# Patient Record
Sex: MALE | Race: WHITE | Employment: UNEMPLOYED | URBAN - METROPOLITAN AREA
[De-identification: names, ages, dates, MRNs, and addresses within clinical notes are randomized per-mention and may not be internally consistent; named-entity substitution may affect disease eponyms.]

---

## 2018-02-21 ENCOUNTER — HOSPITAL ENCOUNTER (INPATIENT)
Dept: ONCOLOGY | Age: 68
LOS: 23 days | Discharge: HOME | DRG: 425 | End: 2018-03-16
Attending: EMERGENCY MEDICINE | Admitting: INTERNAL MEDICINE
Payer: MEDICAID

## 2018-02-21 DIAGNOSIS — N18.6 ESRD ON DIALYSIS (HCC): Primary | ICD-10-CM

## 2018-02-21 DIAGNOSIS — Z99.2 ESRD ON DIALYSIS (HCC): Primary | ICD-10-CM

## 2018-02-21 PROBLEM — E87.70 FLUID OVERLOAD: Status: ACTIVE | Noted: 2018-02-21

## 2018-02-21 PROBLEM — D64.9 ANEMIA: Status: ACTIVE | Noted: 2018-02-21

## 2018-02-21 LAB
ALBUMIN SERPL-MCNC: 2.5 G/DL (ref 3.5–5.2)
ALP BLD-CCNC: 70 U/L (ref 40–129)
ALT SERPL-CCNC: 47 U/L (ref 0–40)
AMYLASE: 185 U/L (ref 20–100)
ANION GAP SERPL CALCULATED.3IONS-SCNC: 16 MMOL/L (ref 7–16)
APTT: 27.3 SEC (ref 24.5–35.1)
AST SERPL-CCNC: 22 U/L (ref 0–39)
BACTERIA: ABNORMAL /HPF
BASOPHILS ABSOLUTE: 0 E9/L (ref 0–0.2)
BASOPHILS RELATIVE PERCENT: 0 % (ref 0–2)
BILIRUB SERPL-MCNC: 0.7 MG/DL (ref 0–1.2)
BILIRUBIN URINE: NEGATIVE
BLOOD, URINE: ABNORMAL
BUN BLDV-MCNC: 78 MG/DL (ref 8–23)
C-REACTIVE PROTEIN: 2.6 MG/DL (ref 0–0.4)
CALCIUM SERPL-MCNC: 7.6 MG/DL (ref 8.6–10.2)
CASTS: ABNORMAL /LPF
CHLORIDE BLD-SCNC: 99 MMOL/L (ref 98–107)
CLARITY: CLEAR
CO2: 20 MMOL/L (ref 22–29)
COLOR: YELLOW
CREAT SERPL-MCNC: 5 MG/DL (ref 0.7–1.2)
EKG ATRIAL RATE: 0 BPM
EKG Q-T INTERVAL: 0 MS
EKG QRS DURATION: 0 MS
EKG QTC CALCULATION (BAZETT): 0 MS
EKG R AXIS: 0 DEGREES
EKG T AXIS: 0 DEGREES
EKG VENTRICULAR RATE: 0 BPM
EOSINOPHILS ABSOLUTE: 0.16 E9/L (ref 0.05–0.5)
EOSINOPHILS RELATIVE PERCENT: 1.7 % (ref 0–6)
GFR AFRICAN AMERICAN: 14
GFR NON-AFRICAN AMERICAN: 12 ML/MIN/1.73
GLUCOSE BLD-MCNC: 327 MG/DL (ref 74–109)
GLUCOSE URINE: 500 MG/DL
HCT VFR BLD CALC: 24.4 % (ref 37–54)
HEMOGLOBIN: 8.5 G/DL (ref 12.5–16.5)
IMMATURE GRANULOCYTES #: 0.11 E9/L
IMMATURE GRANULOCYTES %: 1.2 % (ref 0–5)
INR BLD: 1.1
KETONES, URINE: NEGATIVE MG/DL
LACTIC ACID: 2.2 MMOL/L (ref 0.5–2.2)
LEUKOCYTE ESTERASE, URINE: NEGATIVE
LIPASE: 44 U/L (ref 13–60)
LYMPHOCYTES ABSOLUTE: 0.89 E9/L (ref 1.5–4)
LYMPHOCYTES RELATIVE PERCENT: 9.5 % (ref 20–42)
MCH RBC QN AUTO: 32.6 PG (ref 26–35)
MCHC RBC AUTO-ENTMCNC: 34.8 % (ref 32–34.5)
MCV RBC AUTO: 93.5 FL (ref 80–99.9)
METER GLUCOSE: 350 MG/DL (ref 70–110)
MONOCYTES ABSOLUTE: 0.73 E9/L (ref 0.1–0.95)
MONOCYTES RELATIVE PERCENT: 7.8 % (ref 2–12)
NEUTROPHILS ABSOLUTE: 7.43 E9/L (ref 1.8–7.3)
NEUTROPHILS RELATIVE PERCENT: 79.8 % (ref 43–80)
NITRITE, URINE: NEGATIVE
PDW BLD-RTO: 14.4 FL (ref 11.5–15)
PH UA: 6 (ref 5–9)
PLATELET # BLD: 139 E9/L (ref 130–450)
PMV BLD AUTO: 13.1 FL (ref 7–12)
POTASSIUM SERPL-SCNC: 4.3 MMOL/L (ref 3.5–5)
PRO-BNP: ABNORMAL PG/ML (ref 0–125)
PROTEIN UA: >=300 MG/DL
PROTHROMBIN TIME: 12.3 SEC (ref 9.3–12.4)
RBC # BLD: 2.61 E12/L (ref 3.8–5.8)
RBC UA: ABNORMAL /HPF (ref 0–2)
SEDIMENTATION RATE, ERYTHROCYTE: 40 MM/HR (ref 0–15)
SODIUM BLD-SCNC: 135 MMOL/L (ref 132–146)
SPECIFIC GRAVITY UA: 1.02 (ref 1–1.03)
TOTAL PROTEIN: 5 G/DL (ref 6.4–8.3)
UROBILINOGEN, URINE: 0.2 E.U./DL
WBC # BLD: 9.3 E9/L (ref 4.5–11.5)
WBC UA: ABNORMAL /HPF (ref 0–5)

## 2018-02-21 PROCEDURE — 82150 ASSAY OF AMYLASE: CPT

## 2018-02-21 PROCEDURE — 99285 EMERGENCY DEPT VISIT HI MDM: CPT

## 2018-02-21 PROCEDURE — 80053 COMPREHEN METABOLIC PANEL: CPT

## 2018-02-21 PROCEDURE — 83690 ASSAY OF LIPASE: CPT

## 2018-02-21 PROCEDURE — 87340 HEPATITIS B SURFACE AG IA: CPT

## 2018-02-21 PROCEDURE — 87107 FUNGI IDENTIFICATION MOLD: CPT

## 2018-02-21 PROCEDURE — 85651 RBC SED RATE NONAUTOMATED: CPT

## 2018-02-21 PROCEDURE — 85610 PROTHROMBIN TIME: CPT

## 2018-02-21 PROCEDURE — A9540 TC99M MAA: HCPCS

## 2018-02-21 PROCEDURE — 96365 THER/PROPH/DIAG IV INF INIT: CPT

## 2018-02-21 PROCEDURE — 86706 HEP B SURFACE ANTIBODY: CPT

## 2018-02-21 PROCEDURE — 87070 CULTURE OTHR SPECIMN AEROBIC: CPT

## 2018-02-21 PROCEDURE — 36415 COLL VENOUS BLD VENIPUNCTURE: CPT

## 2018-02-21 PROCEDURE — 82962 GLUCOSE BLOOD TEST: CPT

## 2018-02-21 PROCEDURE — 81015 MICROSCOPIC EXAM OF URINE: CPT

## 2018-02-21 PROCEDURE — 81003 URINALYSIS AUTO W/O SCOPE: CPT

## 2018-02-21 PROCEDURE — 85025 COMPLETE CBC W/AUTO DIFF WBC: CPT

## 2018-02-21 PROCEDURE — 9990 CHARGE CONVERSION

## 2018-02-21 PROCEDURE — 93970 EXTREMITY STUDY: CPT

## 2018-02-21 PROCEDURE — 85730 THROMBOPLASTIN TIME PARTIAL: CPT

## 2018-02-21 PROCEDURE — 86140 C-REACTIVE PROTEIN: CPT

## 2018-02-21 PROCEDURE — 86803 HEPATITIS C AB TEST: CPT

## 2018-02-21 PROCEDURE — 94760 N-INVAS EAR/PLS OXIMETRY 1: CPT

## 2018-02-21 PROCEDURE — 87205 SMEAR GRAM STAIN: CPT

## 2018-02-21 PROCEDURE — 71045 X-RAY EXAM CHEST 1 VIEW: CPT

## 2018-02-21 PROCEDURE — 83880 ASSAY OF NATRIURETIC PEPTIDE: CPT

## 2018-02-21 PROCEDURE — 78582 LUNG VENTILAT&PERFUS IMAGING: CPT

## 2018-02-21 PROCEDURE — 96375 TX/PRO/DX INJ NEW DRUG ADDON: CPT

## 2018-02-21 PROCEDURE — A9558 XE133 XENON 10MCI: HCPCS

## 2018-02-21 PROCEDURE — 86255 FLUORESCENT ANTIBODY SCREEN: CPT

## 2018-02-21 PROCEDURE — 87040 BLOOD CULTURE FOR BACTERIA: CPT

## 2018-02-21 PROCEDURE — 86038 ANTINUCLEAR ANTIBODIES: CPT

## 2018-02-21 PROCEDURE — 71250 CT THORAX DX C-: CPT

## 2018-02-21 PROCEDURE — 83605 ASSAY OF LACTIC ACID: CPT

## 2018-02-21 PROCEDURE — 93005 ELECTROCARDIOGRAM TRACING: CPT

## 2018-02-21 RX ORDER — SODIUM CHLORIDE 0.9 % (FLUSH) 0.9 %
10 SYRINGE (ML) INJECTION PRN
Status: DISCONTINUED | OUTPATIENT
Start: 2018-02-21 | End: 2018-02-21 | Stop reason: SDUPTHER

## 2018-02-21 RX ORDER — AMLODIPINE BESYLATE 5 MG/1
5 TABLET ORAL EVERY MORNING
Status: DISCONTINUED | OUTPATIENT
Start: 2018-02-22 | End: 2018-03-09

## 2018-02-21 RX ORDER — SODIUM CHLORIDE 0.9 % (FLUSH) 0.9 %
10 SYRINGE (ML) INJECTION EVERY 12 HOURS SCHEDULED
Status: DISCONTINUED | OUTPATIENT
Start: 2018-02-21 | End: 2018-03-16 | Stop reason: HOSPADM

## 2018-02-21 RX ORDER — OYSTER SHELL CALCIUM WITH VITAMIN D 500; 200 MG/1; [IU]/1
1 TABLET, FILM COATED ORAL EVERY MORNING
Status: DISCONTINUED | OUTPATIENT
Start: 2018-02-22 | End: 2018-02-24

## 2018-02-21 RX ORDER — SODIUM CHLORIDE 0.9 % (FLUSH) 0.9 %
10 SYRINGE (ML) INJECTION PRN
Status: DISCONTINUED | OUTPATIENT
Start: 2018-02-21 | End: 2018-03-16 | Stop reason: HOSPADM

## 2018-02-21 RX ORDER — ONDANSETRON 2 MG/ML
4 INJECTION INTRAMUSCULAR; INTRAVENOUS EVERY 6 HOURS PRN
Status: DISCONTINUED | OUTPATIENT
Start: 2018-02-21 | End: 2018-03-16 | Stop reason: HOSPADM

## 2018-02-21 RX ORDER — DEXTROSE MONOHYDRATE 25 G/50ML
12.5 INJECTION, SOLUTION INTRAVENOUS PRN
Status: DISCONTINUED | OUTPATIENT
Start: 2018-02-21 | End: 2018-03-16 | Stop reason: HOSPADM

## 2018-02-21 RX ORDER — NICOTINE POLACRILEX 4 MG
15 LOZENGE BUCCAL PRN
Status: DISCONTINUED | OUTPATIENT
Start: 2018-02-21 | End: 2018-03-16 | Stop reason: HOSPADM

## 2018-02-21 RX ORDER — AMLODIPINE BESYLATE 5 MG/1
5 TABLET ORAL EVERY MORNING
Status: ON HOLD | COMMUNITY
End: 2018-05-09

## 2018-02-21 RX ORDER — SODIUM CHLORIDE 0.9 % (FLUSH) 0.9 %
10 SYRINGE (ML) INJECTION EVERY 12 HOURS SCHEDULED
Status: DISCONTINUED | OUTPATIENT
Start: 2018-02-21 | End: 2018-02-21 | Stop reason: SDUPTHER

## 2018-02-21 RX ORDER — ACETAMINOPHEN 325 MG/1
650 TABLET ORAL EVERY 4 HOURS PRN
Status: DISCONTINUED | OUTPATIENT
Start: 2018-02-21 | End: 2018-02-21 | Stop reason: SDUPTHER

## 2018-02-21 RX ORDER — DEXTROSE MONOHYDRATE 50 MG/ML
100 INJECTION, SOLUTION INTRAVENOUS PRN
Status: DISCONTINUED | OUTPATIENT
Start: 2018-02-21 | End: 2018-03-16 | Stop reason: HOSPADM

## 2018-02-21 RX ORDER — HEPARIN SODIUM 10000 [USP'U]/ML
5000 INJECTION, SOLUTION INTRAVENOUS; SUBCUTANEOUS EVERY 8 HOURS SCHEDULED
Status: DISCONTINUED | OUTPATIENT
Start: 2018-02-21 | End: 2018-03-16 | Stop reason: HOSPADM

## 2018-02-21 RX ORDER — LEVOFLOXACIN 500 MG/1
250 TABLET, FILM COATED ORAL DAILY
Status: ON HOLD | COMMUNITY
End: 2018-03-16 | Stop reason: HOSPADM

## 2018-02-21 RX ORDER — ACETAMINOPHEN 325 MG/1
650 TABLET ORAL EVERY 4 HOURS PRN
Status: DISCONTINUED | OUTPATIENT
Start: 2018-02-21 | End: 2018-03-16 | Stop reason: HOSPADM

## 2018-02-21 RX ORDER — BUMETANIDE 1 MG/1
1 TABLET ORAL
Status: DISCONTINUED | OUTPATIENT
Start: 2018-02-21 | End: 2018-02-24

## 2018-02-21 RX ORDER — BUMETANIDE 1 MG/1
1 TABLET ORAL
Status: ON HOLD | COMMUNITY
End: 2018-03-16 | Stop reason: HOSPADM

## 2018-02-21 RX ADMIN — Medication 6 MILLICURIE: at 15:48

## 2018-02-21 RX ADMIN — HEPARIN SODIUM 5000 UNITS: 10000 INJECTION, SOLUTION INTRAVENOUS; SUBCUTANEOUS at 21:30

## 2018-02-21 RX ADMIN — Medication 10 ML: at 21:40

## 2018-02-21 RX ADMIN — INSULIN LISPRO 3 UNITS: 100 INJECTION, SOLUTION INTRAVENOUS; SUBCUTANEOUS at 21:49

## 2018-02-21 RX ADMIN — BUMETANIDE 1 MG: 1 TABLET ORAL at 21:39

## 2018-02-21 RX ADMIN — Medication 10 MILLICURIE: at 15:48

## 2018-02-21 ASSESSMENT — ENCOUNTER SYMPTOMS
EYE PAIN: 0
WHEEZING: 0
SORE THROAT: 0
DIARRHEA: 0
NAUSEA: 0
SHORTNESS OF BREATH: 1
BACK PAIN: 0
HEMOPTYSIS: 1
VOMITING: 0
EYE DISCHARGE: 0
SPUTUM PRODUCTION: 1
COUGH: 1
EYE REDNESS: 0
ABDOMINAL PAIN: 0
SINUS PRESSURE: 0

## 2018-02-21 ASSESSMENT — PAIN SCALES - WONG BAKER: WONGBAKER_NUMERICALRESPONSE: 0

## 2018-02-21 NOTE — ED PROVIDER NOTES
-------------------------------------------------    LABS:  Results for orders placed or performed during the hospital encounter of 02/21/18   CBC Auto Differential   Result Value Ref Range    WBC 9.3 4.5 - 11.5 E9/L    RBC 2.61 (L) 3.80 - 5.80 E12/L    Hemoglobin 8.5 (L) 12.5 - 16.5 g/dL    Hematocrit 24.4 (L) 37.0 - 54.0 %    MCV 93.5 80.0 - 99.9 fL    MCH 32.6 26.0 - 35.0 pg    MCHC 34.8 (H) 32.0 - 34.5 %    RDW 14.4 11.5 - 15.0 fL    Platelets 287 085 - 150 E9/L    MPV 13.1 (H) 7.0 - 12.0 fL    Neutrophils % 79.8 43.0 - 80.0 %    Immature Granulocytes % 1.2 0.0 - 5.0 %    Lymphocytes % 9.5 (L) 20.0 - 42.0 %    Monocytes % 7.8 2.0 - 12.0 %    Eosinophils % 1.7 0.0 - 6.0 %    Basophils % 0.0 0.0 - 2.0 %    Neutrophils # 7.43 (H) 1.80 - 7.30 E9/L    Immature Granulocytes # 0.11 E9/L    Lymphocytes # 0.89 (L) 1.50 - 4.00 E9/L    Monocytes # 0.73 0.10 - 0.95 E9/L    Eosinophils # 0.16 0.05 - 0.50 E9/L    Basophils # 0.00 0.00 - 0.20 E9/L   Comprehensive Metabolic Panel   Result Value Ref Range    Sodium 135 132 - 146 mmol/L    Potassium 4.3 3.5 - 5.0 mmol/L    Chloride 99 98 - 107 mmol/L    CO2 20 (L) 22 - 29 mmol/L    Anion Gap 16 7 - 16 mmol/L    Glucose 327 (H) 74 - 109 mg/dL    BUN 78 (H) 8 - 23 mg/dL    CREATININE 5.0 (H) 0.7 - 1.2 mg/dL    GFR Non-African American 12 >=60 mL/min/1.73    GFR African American 14     Calcium 7.6 (L) 8.6 - 10.2 mg/dL    Total Protein 5.0 (L) 6.4 - 8.3 g/dL    Alb 2.5 (L) 3.5 - 5.2 g/dL    Total Bilirubin 0.7 0.0 - 1.2 mg/dL    Alkaline Phosphatase 70 40 - 129 U/L    ALT 47 (H) 0 - 40 U/L    AST 22 0 - 39 U/L   Lipase   Result Value Ref Range    Lipase 44 13 - 60 U/L   Lactic Acid, Plasma   Result Value Ref Range    Lactic Acid 2.2 0.5 - 2.2 mmol/L   Amylase   Result Value Ref Range    Amylase 185 (H) 20 - 100 U/L   Protime-INR   Result Value Ref Range    Protime 12.3 9.3 - 12.4 sec    INR 1.1    APTT   Result Value Ref Range    aPTT 27.3 24.5 - 35.1 sec   Urinalysis   Result Value Ref Range    Color, UA Yellow Straw/Yellow    Clarity, UA Clear Clear    Glucose, Ur 500 (A) Negative mg/dL    Bilirubin Urine Negative Negative    Ketones, Urine Negative Negative mg/dL    Specific Gravity, UA 1.020 1.005 - 1.030    Blood, Urine MODERATE (A) Negative    pH, UA 6.0 5.0 - 9.0    Protein, UA >=300 (A) Negative mg/dL    Urobilinogen, Urine 0.2 <2.0 E.U./dL    Nitrite, Urine Negative Negative    Leukocyte Esterase, Urine Negative Negative   Brain Natriuretic Peptide   Result Value Ref Range    Pro-BNP 20,873 (H) 0 - 125 pg/mL   Microscopic Urinalysis   Result Value Ref Range    Casts FEW /LPF    WBC, UA 2-5 0 - 5 /HPF    RBC, UA 0-1 0 - 2 /HPF    Bacteria, UA FEW (A) /HPF   Sedimentation Rate   Result Value Ref Range    Sed Rate 40 (H) 0 - 15 mm/Hr   C-Reactive Protein   Result Value Ref Range    CRP 2.6 (H) 0.0 - 0.4 mg/dL   POCT Glucose   Result Value Ref Range    Meter Glucose 350 (H) 70 - 110 mg/dL   EKG 12 Lead   Result Value Ref Range    Ventricular Rate 0 BPM    Atrial Rate 0 BPM    QRS Duration 0 ms    Q-T Interval 0 ms    QTc Calculation (Bazett) 0 ms    R Axis 0 degrees    T Axis 0 degrees       RADIOLOGY:  US DVT LOWER BILATERAL COMPLETE   Final Result   Patent deep venous system of the Bilateral lower   extremity. No evidence for DVT. CT Chest WO Contrast   Final Result   1. Bilateral pleural effusions, left greater than right. 2. Small pericardial effusion. NM LUNG VENT/PERFUSION (VQ)   Final Result   Findings are consistent with a low probability of   pulmonary embolism. This result should be interpreted in conjunction with the clinical   pretest probability for pulmonary embolism. XR CHEST PORTABLE   Final Result   1. Stable, enlarged cardiomediastinal silhouette. .   2. Bibasilar infiltrate/pneumonia versus atelectasis versus a   combination of both with left pleural effusion.    3. Suspected underlying mild central pulmonary vascular congestion. 4. Right-sided central line as above. EKG:    EKG not found. Cardiac monitor shows NSR      ------------------------- NURSING NOTES AND VITALS REVIEWED ---------------------------  Date / Time Roomed:  2/21/2018  6:19 PM  ED Bed Assignment:  [id number]    The nursing notes within the ED encounter and vital signs as below have been reviewed. Patient Vitals for the past 24 hrs:   BP Temp Temp src Pulse Resp SpO2 Height Weight   02/21/18 1935 - - - 88 - 99 % - -   02/21/18 1928 (!) 154/88 98 °F (36.7 °C) Oral 85 18 - 5' 3\" (1.6 m) 206 lb (93.4 kg)   02/21/18 1851 (!) 160/95 98.3 °F (36.8 °C) Oral 89 18 93 % - -   02/21/18 1642 (!) 152/86 - - 74 18 100 % - -   02/21/18 1543 (!) 153/90 - - 76 20 99 % - -   02/21/18 1323 (!) 148/72 97.8 °F (36.6 °C) Oral 82 20 100 % 5' 3\" (1.6 m) 202 lb 13.2 oz (92 kg)       Oxygen Saturation Interpretation: Normal    ------------------------------------------ PROGRESS NOTES ------------------------------------------  Re-evaluation(s):  Time: 1700  Patients symptoms show no change  Repeat physical examination is not changed  Spoke with Dr. Steven Schneider and discussed case. She will arrange dialysis for the patient today. Time: 18    Spoke with Dr. Mathew Geller. Discussed case. He will admit the patient. Counseling:  I have spoken with the patient and family and discussed todays results, in addition to providing specific details for the plan of care and counseling regarding the diagnosis and prognosis. Their questions are answered at this time and they are agreeable with the plan of admission.    --------------------------------- ADDITIONAL PROVIDER NOTES ---------------------------------  Consultations:  Time: 8245. Spoke with Dr. Mathew Geller. Discussed case. They will admit the patient.   This patient's ED course included: a personal history and physicial examination, re-evaluation prior to disposition, multiple bedside re-evaluations, cardiac monitoring, continuous

## 2018-02-21 NOTE — PROGRESS NOTES
27.3 2018   [APTT}  Troponin:    Lab Results   Component Value Date    TROPONINI 0.09 2016     U/A:    Lab Results   Component Value Date    COLORU Yellow 2018    PHUR 6.0 2018    LABCAST FEW 2018    WBCUA 2-5 2018    RBCUA 0-1 2018    BACTERIA FEW 2018    CLARITYU Clear 2018    SPECGRAV 1.020 2018    LEUKOCYTESUR Negative 2018    UROBILINOGEN 0.2 2018    BILIRUBINUR Negative 2018    BLOODU MODERATE 2018    GLUCOSEU 500 2018    AMORPHOUS MANY 2016     ABG:  No results found for: PHART, BFZ1JQM, PO2ART, YPB7RKG, BEART, THGBART, BVD1TOM, U3AAHUJZ  HgBA1c:  No components found for: HGBA1C  Microalbumen/Creatinine ratio:  No components found for: RUCREAT  FLP:    Lab Results   Component Value Date    TRIG 65 2016    HDL 53 2016    LDLCALC 86 2016    LABVLDL 13 2016     TSH:    Lab Results   Component Value Date    TSH 6.250 11/15/2016     VITAMIN B12: No components found for: B12  FOLATE:    Lab Results   Component Value Date    FOLATE 19.9 2016     Iron Saturation:  No components found for: PERCENTFE  FERRITIN:    Lab Results   Component Value Date    FERRITIN 250 2016     RPR:  No results found for: RPR  ABRAM:    Lab Results   Component Value Date    ABRAM NEGATIVE 2016     AMYLASE:    Lab Results   Component Value Date    AMYLASE 185 2018     LIPASE:    Lab Results   Component Value Date    LIPASE 44 2018     24 Hour Urine for Protein:  No components found for: RAWUPRO, UHRS3, DEQH94LH, UTV3  24 Hour Urine for Creatinine Clearance:  No components found for: CREAT4, UHRS10, UTV10     Imagin2018       Exam: XR CHEST PORTABLE       Number of Views: 1       Indication:   Shortness of breath       Comparison: Chest radiographs 3/22/2016. CT chest 2016.       Findings:  There is a stable, enlarged cardiomediastinal silhouette   with bibasilar airspace disease, left greater than right, with left   pleural effusion and interval placement of a large-bore right-sided   central line with its distal tip overlying the region of the distal   superior vena cava/right atrium. Suspected underlying mild central   pulmonary vascular congestion. [place] No pneumothorax.           Impression   1. Stable, enlarged cardiomediastinal silhouette. .   2. Bibasilar infiltrate/pneumonia versus atelectasis versus a   combination of both with left pleural effusion. 3. Suspected underlying mild central pulmonary vascular congestion. 4. Right-sided central line as above.                                                                                                                                         Assessment  1-CKD 5/ESRD in the setting of the DM2  And HTN-now requiring RRT with IHD-will plan for IHD this PM and in the AM for clearance and vol removal-check Hep status- and set up outpt IHD    2-Hypervolemia with pl eff and peripheral edema-will  Check US for DVT-V/Q low probability-recent hx of pne-on ceftriaxone and doxycycline    3-Anion Gap met Acidosis in the setting of the ESRD and missed IHD-follow HCO3 with resumption of reg IHD treatments    4- Anemia in CKD-11/13/16 ferritin 250 , % Fe++ Sat 23 , F96=242 and folate 13-(-)  SPEP and UPEP-start ARNOLDO as the HgB <10-will check Fe++, B12, folate    5-HTN with CKD V/ESRD-suboptimally controlled above the goal ,140/90-exacerbated by the vol overload-will follow with the ultrafiltration     6-Secondary HPTH with hypocalcemia in the setting of the hypoalbuminemia-check PO4, PTH, Vit D, ionized Calcium 10/23/16 PTH 72  low Vit D 8         Thank you Dr. Zac Freitas for allowing us to participate in care of Mikie Anderson  6:47 PM  2/21/2018

## 2018-02-21 NOTE — ED NOTES
Called lab and spoke with Andrew Membreno regarding peripheral blood smear with path review. Per Bill, test will be done off of cbc with diff that was already sent.       Glenn Magallon RN  02/21/18 2986

## 2018-02-21 NOTE — ED NOTES
Patient to ED with complaint of SOB, fatigue, and swelling to arms and legs. Patient recently to [address] from [facility], had dialysis port placed to right chest in [facility] within the last few weeks, last treatment 4 days ago. Per family patient had been in hospital for approx. 2 weeks in [facility] due to pneumonia diagnosis and was started on dialysis at that time. Patient resting in bed at this time, respirations non-labored, cardiac monitor and pulse ox in place, call light in reach.        Jaxon Martins RN  02/21/18 6596

## 2018-02-22 LAB
ALBUMIN SERPL-MCNC: 2.3 G/DL (ref 3.5–5.2)
ALP BLD-CCNC: 67 U/L (ref 40–129)
ALT SERPL-CCNC: 38 U/L (ref 0–40)
ANION GAP SERPL CALCULATED.3IONS-SCNC: 9 MMOL/L (ref 7–16)
AST SERPL-CCNC: 21 U/L (ref 0–39)
BASOPHILS ABSOLUTE: 0 E9/L (ref 0–0.2)
BASOPHILS RELATIVE PERCENT: 0 % (ref 0–2)
BILIRUB SERPL-MCNC: 0.6 MG/DL (ref 0–1.2)
BUN BLDV-MCNC: 36 MG/DL (ref 8–23)
CALCIUM IONIZED: 1.1 MMOL/L (ref 1.15–1.33)
CALCIUM SERPL-MCNC: 7.5 MG/DL (ref 8.6–10.2)
CHLORIDE BLD-SCNC: 102 MMOL/L (ref 98–107)
CO2: 27 MMOL/L (ref 22–29)
CREAT SERPL-MCNC: 2.9 MG/DL (ref 0.7–1.2)
EOSINOPHILS ABSOLUTE: 0.09 E9/L (ref 0.05–0.5)
EOSINOPHILS RELATIVE PERCENT: 1.4 % (ref 0–6)
FERRITIN: 485 NG/ML
FOLATE: 5.3 NG/ML (ref 4.8–24.2)
GFR AFRICAN AMERICAN: 26
GFR NON-AFRICAN AMERICAN: 22 ML/MIN/1.73
GLUCOSE BLD-MCNC: 178 MG/DL (ref 74–109)
HBA1C MFR BLD: 7.3 % (ref 4.8–5.9)
HBV SURFACE AB TITR SER: NORMAL {TITER}
HCT VFR BLD CALC: 21.6 % (ref 37–54)
HCT VFR BLD CALC: 23.6 % (ref 37–54)
HEMOGLOBIN: 7.4 G/DL (ref 12.5–16.5)
HEMOGLOBIN: 8.2 G/DL (ref 12.5–16.5)
HEPATITIS B SURFACE ANTIGEN INTERPRETATION: NORMAL
HEPATITIS C ANTIBODY INTERPRETATION: NORMAL
IMMATURE GRANULOCYTES #: 0.04 E9/L
IMMATURE GRANULOCYTES %: 0.6 % (ref 0–5)
IRON SATURATION: 20 % (ref 20–55)
IRON: 28 MCG/DL (ref 59–158)
LYMPHOCYTES ABSOLUTE: 0.72 E9/L (ref 1.5–4)
LYMPHOCYTES RELATIVE PERCENT: 11.1 % (ref 20–42)
MAGNESIUM: 1.7 MG/DL (ref 1.6–2.6)
MCH RBC QN AUTO: 32.2 PG (ref 26–35)
MCH RBC QN AUTO: 32.2 PG (ref 26–35)
MCHC RBC AUTO-ENTMCNC: 34.3 % (ref 32–34.5)
MCHC RBC AUTO-ENTMCNC: 34.7 % (ref 32–34.5)
MCV RBC AUTO: 92.5 FL (ref 80–99.9)
MCV RBC AUTO: 93.9 FL (ref 80–99.9)
METER GLUCOSE: 188 MG/DL (ref 70–110)
METER GLUCOSE: 220 MG/DL (ref 70–110)
METER GLUCOSE: 222 MG/DL (ref 70–110)
METER GLUCOSE: 257 MG/DL (ref 70–110)
MONOCYTES ABSOLUTE: 0.57 E9/L (ref 0.1–0.95)
MONOCYTES RELATIVE PERCENT: 8.8 % (ref 2–12)
NEUTROPHILS ABSOLUTE: 5.09 E9/L (ref 1.8–7.3)
NEUTROPHILS RELATIVE PERCENT: 78.1 % (ref 43–80)
PARATHYROID HORMONE INTACT: 308 PG/ML (ref 15–65)
PATHOLOGIST REVIEW: NORMAL
PDW BLD-RTO: 14.2 FL (ref 11.5–15)
PDW BLD-RTO: 14.3 FL (ref 11.5–15)
PHOSPHORUS: 2.3 MG/DL (ref 2.5–4.5)
PLATELET # BLD: 117 E9/L (ref 130–450)
PLATELET # BLD: 128 E9/L (ref 130–450)
PMV BLD AUTO: 12.7 FL (ref 7–12)
PMV BLD AUTO: 12.7 FL (ref 7–12)
POTASSIUM SERPL-SCNC: 4 MMOL/L (ref 3.5–5)
RBC # BLD: 2.3 E12/L (ref 3.8–5.8)
RBC # BLD: 2.55 E12/L (ref 3.8–5.8)
SODIUM BLD-SCNC: 138 MMOL/L (ref 132–146)
TOTAL IRON BINDING CAPACITY: 137 MCG/DL (ref 250–450)
TOTAL PROTEIN: 4.5 G/DL (ref 6.4–8.3)
VITAMIN B-12: 599 PG/ML (ref 211–946)
VITAMIN D 25-HYDROXY: 5 NG/ML (ref 30–100)
WBC # BLD: 6.1 E9/L (ref 4.5–11.5)
WBC # BLD: 6.5 E9/L (ref 4.5–11.5)

## 2018-02-22 PROCEDURE — 82746 ASSAY OF FOLIC ACID SERUM: CPT

## 2018-02-22 PROCEDURE — G0257 UNSCHED DIALYSIS ESRD PT HOS: HCPCS

## 2018-02-22 PROCEDURE — 82728 ASSAY OF FERRITIN: CPT

## 2018-02-22 PROCEDURE — 83970 ASSAY OF PARATHORMONE: CPT

## 2018-02-22 PROCEDURE — 36415 COLL VENOUS BLD VENIPUNCTURE: CPT

## 2018-02-22 PROCEDURE — 5A1D70Z PERFORMANCE OF URINARY FILTRATION, INTERMITTENT, LESS THAN 6 HOURS PER DAY: ICD-10-PCS | Performed by: INTERNAL MEDICINE

## 2018-02-22 PROCEDURE — 82607 VITAMIN B-12: CPT

## 2018-02-22 PROCEDURE — 9990 CHARGE CONVERSION

## 2018-02-22 PROCEDURE — 83540 ASSAY OF IRON: CPT

## 2018-02-22 PROCEDURE — 82306 VITAMIN D 25 HYDROXY: CPT

## 2018-02-22 PROCEDURE — 84100 ASSAY OF PHOSPHORUS: CPT

## 2018-02-22 PROCEDURE — 83735 ASSAY OF MAGNESIUM: CPT

## 2018-02-22 PROCEDURE — 85027 COMPLETE CBC AUTOMATED: CPT

## 2018-02-22 PROCEDURE — 86706 HEP B SURFACE ANTIBODY: CPT

## 2018-02-22 PROCEDURE — 85025 COMPLETE CBC W/AUTO DIFF WBC: CPT

## 2018-02-22 PROCEDURE — 82962 GLUCOSE BLOOD TEST: CPT

## 2018-02-22 PROCEDURE — 80053 COMPREHEN METABOLIC PANEL: CPT

## 2018-02-22 PROCEDURE — 83550 IRON BINDING TEST: CPT

## 2018-02-22 PROCEDURE — 80074 ACUTE HEPATITIS PANEL: CPT

## 2018-02-22 PROCEDURE — 83036 HEMOGLOBIN GLYCOSYLATED A1C: CPT

## 2018-02-22 PROCEDURE — 82330 ASSAY OF CALCIUM: CPT

## 2018-02-22 RX ORDER — HEPARIN SODIUM 1000 [USP'U]/ML
INJECTION, SOLUTION INTRAVENOUS; SUBCUTANEOUS
Status: DISPENSED
Start: 2018-02-22 | End: 2018-02-22

## 2018-02-22 RX ADMIN — HEPARIN SODIUM 5000 UNITS: 10000 INJECTION, SOLUTION INTRAVENOUS; SUBCUTANEOUS at 21:23

## 2018-02-22 RX ADMIN — INSULIN LISPRO 1 UNITS: 100 INJECTION, SOLUTION INTRAVENOUS; SUBCUTANEOUS at 21:26

## 2018-02-22 RX ADMIN — Medication 10 ML: at 21:26

## 2018-02-22 RX ADMIN — Medication 10 ML: at 13:41

## 2018-02-22 RX ADMIN — DARBEPOETIN ALFA 60 MCG: 60 INJECTION, SOLUTION INTRAVENOUS; SUBCUTANEOUS at 13:41

## 2018-02-22 RX ADMIN — BUMETANIDE 1 MG: 1 TABLET ORAL at 12:48

## 2018-02-22 RX ADMIN — OYSTER SHELL CALCIUM WITH VITAMIN D 1 TABLET: 500; 200 TABLET, FILM COATED ORAL at 12:48

## 2018-02-22 RX ADMIN — INSULIN LISPRO 2 UNITS: 100 INJECTION, SOLUTION INTRAVENOUS; SUBCUTANEOUS at 12:48

## 2018-02-22 RX ADMIN — HEPARIN SODIUM 5000 UNITS: 10000 INJECTION, SOLUTION INTRAVENOUS; SUBCUTANEOUS at 12:48

## 2018-02-22 RX ADMIN — INSULIN LISPRO 3 UNITS: 100 INJECTION, SOLUTION INTRAVENOUS; SUBCUTANEOUS at 18:08

## 2018-02-22 RX ADMIN — HEPARIN SODIUM 5000 UNITS: 10000 INJECTION, SOLUTION INTRAVENOUS; SUBCUTANEOUS at 06:14

## 2018-02-22 RX ADMIN — AMLODIPINE BESYLATE 5 MG: 5 TABLET ORAL at 12:48

## 2018-02-22 ASSESSMENT — PAIN SCALES - GENERAL
PAINLEVEL_OUTOF10: 0
PAINLEVEL_OUTOF10: 0

## 2018-02-22 ASSESSMENT — PAIN SCALES - WONG BAKER: WONGBAKER_NUMERICALRESPONSE: 0

## 2018-02-22 NOTE — PROGRESS NOTES
Nephrology   Note  Patient's Name: Yaquelin Pearson  10:34 AM  2/22/2018    Nephrologist: Izabel Baires    Reason for Consult:  Proteinuria  Requesting Physician:  No primary care provider on file. Chief Complaint:  Edema    History Obtained From:  patient, spouse/SO and relative(s)    History of Present Ilness:    Yaquelin Pearson is a 78 y.o. male with no known history of CKD but a 12yr hx of DM2 with associated neuropathy and retinopathy who presented to the ER with dyspnea and edema. He was in [facility]  And started on RRT apprx 4 weeks. He is a [facility] resident and returned here to the [facility] over the last 24hrs. He flew from [facility] to [place] and missed his connecting flight and spent the [facility] in the airport  And landed today after a 26hr trip. He has not dialyzed for the last 3 days. He has ongoing edema in the LE. He was also recently treated in [place] ,for pneumonia. He has a HX of PAD  recently hospitalized for gangrene of the L 2nd toe. Cyn mckeon he is resting comfortably in bed and denies dyspnea or CP    2/22/18: Pt seen on IHD this Am tolerating the procedure well, no new coplaints        Past Medical History:   Diagnosis Date    CAP (community acquired pneumonia) 2/22/2016    Diabetes mellitus ([facility]     Dialysis patient ([facility]     Hypertension     Pneumonia        Past Surgical History:   Procedure Laterality Date    OTHER SURGICAL HISTORY Right 10/24/2016    right foot I&D with bone debridement and biopsy partial second digit amputation       Family History   Problem Relation Age of Onset    No Known Problems Mother     No Known Problems Father     No Known Problems Sister     No Known Problems Brother         reports that he has never smoked. He does not have any smokeless tobacco history on file. He reports that he does not drink alcohol or use drugs. Allergies:  Review of patient's allergies indicates no known allergies.     Current Medications:      heparin (porcine) 1000 UNIT/ML injection    darbepoetin jesús-polysorbate (ARANESP) injection 60 mcg Weekly   amLODIPine (NORVASC) tablet 5 mg QAM   bumetanide (BUMEX) tablet 1 mg Lunch   calcium-vitamin D (OSCAL-500) 500-200 MG-UNIT per tablet 1 tablet QAM   albuterol-ipratropium (COMBIVENT RESPIMAT)  MCG/ACT inhaler 1 puff BID   sodium chloride flush 0.9 % injection 10 mL 2 times per day   sodium chloride flush 0.9 % injection 10 mL PRN   acetaminophen (TYLENOL) tablet 650 mg Q4H PRN   magnesium hydroxide (MILK OF MAGNESIA) 400 MG/5ML suspension 30 mL Daily PRN   ondansetron (ZOFRAN) injection 4 mg Q6H PRN   heparin (porcine) injection 5,000 Units 3 times per day   insulin lispro (HUMALOG) injection vial 0-6 Units TID WC   insulin lispro (HUMALOG) injection vial 0-3 Units Nightly   glucose (GLUTOSE) 40 % oral gel 15 g PRN   dextrose 50 % solution 12.5 g PRN   glucagon (rDNA) injection 1 mg PRN   dextrose 5 % solution PRN       Review of Systems:   Pertinent items are noted in HPI.  Remainder of a  Complete review of systems is otherwise (-)    Physical exam:   Constitutional: older hispanaic male   VITALS:    BP (!) 143/81   Pulse 82   Temp 97.7 °F (36.5 °C)   Resp 16   Ht 5' 3\" (1.6 m)   Wt 200 lb 9.9 oz (91 kg)   SpO2 97%   BMI 35.54 kg/m²   CURRENT TEMPERATURE:  Temp: 97.7 °F (36.5 °C)  MAXIMUM TEMPERATURE OVER 24HRS:  Temp (24hrs), Av °F (36.7 °C), Min:97.7 °F (36.5 °C), Max:98.3 °F (36.8 °C)    TEMPERATURE RANGE OVER 24HRS:   Temp  Av °F (36.7 °C)  Min: 97.7 °F (36.5 °C)  Max: 98.3 °F (36.8 °C)  CURRENT RESPIRATORY RATE:  Resp: 16  CURRENT PULSE:  Pulse: 82  24HR PULSE RANGE: Pulse  Av.8  Min: 74  Max: 89  CURRENT BLOOD PRESSURE:  BP: (!) 143/81    24HR BLOOD PRESSURE RANGE:  Systolic (81LKV), ZPH:734 , Min:132 , LKE:739   ; Diastolic (81PEU), YBO:13, Min:69, Max:95    8HR BLOOD PRESSURE RANGE:  BP: (132-165)/(69-94)   CURRENT PULSE OXIMETRY:  SpO2: 97 %  24HR PULSE OXIMETRY RANGE:  SpO2  Av %  Min: 93 %  Max: Nikole Fagan is continued HD 2/23 for vol removal    2-Hypervolemia with pl eff and peripheral edema-(-) US for DVT-V/Q low probability-recent hx of pne-on ceftriaxone and doxycycline    3-Anion Gap met Acidosis in the setting of the ESRD and missed IHD-improved HCO3 with resumption of reg IHD treatments    4- Anemia in CKD-11/13/16 ferritin 250 , % Fe++ Sat 23 , N74=141 and folate 13-(-)  SPEP and UPEP-start ARNOLDO as the HgB <10-await Fe++, B12, folate-HgB down from 8.5 to 7.4 no obvious site of bleeding -Thrombocytopenia with the PLT down from 139 and 117-will check CBC with Dif    5-HTN with CKD V/ESRD-suboptimally controlled nearing  the goal <140/90 with  the vol overload-will follow with the ultrafiltration -no new additional meds    6-Secondary HPTH with hypocalcemia in the setting of the hypoalbuminemia-low PO4 and low  ionized Calcium will supplement the PO4 via nutrition and hold binders-will supplement Ca++ via IHD 10/23/16 PTH 72  low Vit D 8 -await PTH, Vit D    Thank you Dr. Mango Barnes for allowing us to participate in care of Candiec Anderson  10:34 AM  2/22/2018

## 2018-02-22 NOTE — H&P
Vent/perfusion (vq)    Result Date: 2018  Clinical history:  hemoptysis; shortness of breath Agents:  10.8 mCi Xe-133 gas inhaled             7.6 mCi technetium-99m MAA I.V. Procedure: Images of lung ventilation were obtained in three phases. Perfusion lung images were then obtained in multiple views. Findings: After rebreathing radioXenon to equilibrium, activity is evenly distributed throughout the lungs. In the washout phase, the gas is cleared from the lungs without regional retention. The perfusion images demonstrate a slightly inhomogeneous distribution of activity throughout both lungs. No segmental or significant subsegmental perfusion defects are identified. Findings are consistent with a low probability of pulmonary embolism. This result should be interpreted in conjunction with the clinical pretest probability for pulmonary embolism. Xr Chest Portable    Result Date: 2018  Patient MRN: [id number] : 1950 Age:  78 years Gender: Male Order Date: 2018 Exam: XR CHEST PORTABLE Number of Views: 1 Indication:   Shortness of breath Comparison: Chest radiographs 3/22/2016. CT chest 2016. Findings: There is a stable, enlarged cardiomediastinal silhouette with bibasilar airspace disease, left greater than right, with left pleural effusion and interval placement of a large-bore right-sided central line with its distal tip overlying the region of the distal superior vena cava/right atrium. Suspected underlying mild central pulmonary vascular congestion. [place] Spr[place] No pneumothorax. 1. Stable, enlarged cardiomediastinal silhouette. . 2. Bibasilar infiltrate/pneumonia versus atelectasis versus a combination of both with left pleural effusion. 3. Suspected underlying mild central pulmonary vascular congestion. 4. Right-sided central line as above.       ASSESSMENT:      Principal Problem:    Fluid overload  Active Problems:    Diabetes mellitus ([address]    Essential hypertension    ESRD on dialysis

## 2018-02-22 NOTE — PROGRESS NOTES
Obtained consent via  tablet for hemodialysis and blood transfusion in [place] [address]. Notified on call dialysis nurse Helena Laurent of new order for dialysis tonight. Td Sheppard stated that there in another case in process and she would try to contact someone so that the patient could receive dialysis tonight. Td Sheppard will contact me once she confirms an on call nurse is available.  Electronically signed by Nish Sifuentes RN on 2/21/2018 at 8:45 PM

## 2018-02-22 NOTE — PROGRESS NOTES
Spoke to [employer], a nurse will be available to do dialysis tonight and will be able to start about 10 pm. Electronically signed by Margarito Leavitt RN on 2/21/2018 at 8:46 PM

## 2018-02-23 LAB
ANCA IFA: NORMAL
ANTI-NUCLEAR ANTIBODY (ANA): NEGATIVE
CALCIUM IONIZED: 1.09 MMOL/L (ref 1.15–1.33)
HAV IGM SER IA-ACNC: NORMAL
HBV SURFACE AB TITR SER: NORMAL {TITER}
HEPATITIS B CORE IGM ANTIBODY: NORMAL
HEPATITIS B SURFACE ANTIGEN INTERPRETATION: NORMAL
HEPATITIS C ANTIBODY INTERPRETATION: NORMAL
MAGNESIUM: 1.8 MG/DL (ref 1.6–2.6)
METER GLUCOSE: 149 MG/DL (ref 70–110)
METER GLUCOSE: 355 MG/DL (ref 70–110)
METER GLUCOSE: 455 MG/DL (ref 70–110)
PHOSPHORUS: 2 MG/DL (ref 2.5–4.5)

## 2018-02-23 PROCEDURE — G0257 UNSCHED DIALYSIS ESRD PT HOS: HCPCS

## 2018-02-23 PROCEDURE — 97161 PT EVAL LOW COMPLEX 20 MIN: CPT

## 2018-02-23 PROCEDURE — 9990 CHARGE CONVERSION

## 2018-02-23 PROCEDURE — 82962 GLUCOSE BLOOD TEST: CPT

## 2018-02-23 PROCEDURE — G8988 SELF CARE GOAL STATUS: HCPCS

## 2018-02-23 PROCEDURE — 94640 AIRWAY INHALATION TREATMENT: CPT

## 2018-02-23 PROCEDURE — 83735 ASSAY OF MAGNESIUM: CPT

## 2018-02-23 PROCEDURE — 84100 ASSAY OF PHOSPHORUS: CPT

## 2018-02-23 PROCEDURE — 97165 OT EVAL LOW COMPLEX 30 MIN: CPT

## 2018-02-23 PROCEDURE — 36415 COLL VENOUS BLD VENIPUNCTURE: CPT

## 2018-02-23 PROCEDURE — 82330 ASSAY OF CALCIUM: CPT

## 2018-02-23 PROCEDURE — G8987 SELF CARE CURRENT STATUS: HCPCS

## 2018-02-23 RX ADMIN — HEPARIN SODIUM 5000 UNITS: 10000 INJECTION, SOLUTION INTRAVENOUS; SUBCUTANEOUS at 12:54

## 2018-02-23 RX ADMIN — INSULIN LISPRO 3 UNITS: 100 INJECTION, SOLUTION INTRAVENOUS; SUBCUTANEOUS at 20:48

## 2018-02-23 RX ADMIN — INSULIN LISPRO 1 UNITS: 100 INJECTION, SOLUTION INTRAVENOUS; SUBCUTANEOUS at 12:53

## 2018-02-23 RX ADMIN — AMLODIPINE BESYLATE 5 MG: 5 TABLET ORAL at 12:49

## 2018-02-23 RX ADMIN — INSULIN LISPRO 6 UNITS: 100 INJECTION, SOLUTION INTRAVENOUS; SUBCUTANEOUS at 18:12

## 2018-02-23 RX ADMIN — HEPARIN SODIUM 5000 UNITS: 10000 INJECTION, SOLUTION INTRAVENOUS; SUBCUTANEOUS at 06:38

## 2018-02-23 RX ADMIN — Medication 10 ML: at 20:52

## 2018-02-23 RX ADMIN — BUMETANIDE 1 MG: 1 TABLET ORAL at 12:49

## 2018-02-23 RX ADMIN — OYSTER SHELL CALCIUM WITH VITAMIN D 1 TABLET: 500; 200 TABLET, FILM COATED ORAL at 12:49

## 2018-02-23 RX ADMIN — HEPARIN SODIUM 5000 UNITS: 10000 INJECTION, SOLUTION INTRAVENOUS; SUBCUTANEOUS at 20:49

## 2018-02-23 ASSESSMENT — PAIN SCALES - GENERAL
PAINLEVEL_OUTOF10: 0
PAINLEVEL_OUTOF10: 0

## 2018-02-23 NOTE — PROGRESS NOTES
Patient 's family member brought in [place] card for this patient. I informed the family member that it had  on 10/01/16. He stated that he would contact [facility] and look into it.  copy is in patients chart.

## 2018-02-23 NOTE — PROGRESS NOTES
[facility] Hospitalist Progress Note    Admitting Date and Time: 2/21/2018  6:19 PM  Admit Dx: ESRD ON DIALYSIS    Subjective: Patient seen sitting up in the chair, no acute distress,  Family at bedside. Speaks very little [place], family assisting with translating. Denies pain, sob, fever, chills, N/V/D  No current complaints. Discussed case with Social work: attempting to set up outpatient HD but cannot due so until he has medicaid. ROS: denies fever, chills, cp, sob, n/v, HA unless stated above.  darbepoetin jesús-polysorbate  60 mcg Subcutaneous Weekly    amLODIPine  5 mg Oral QAM    bumetanide  1 mg Oral Lunch    calcium-vitamin D  1 tablet Oral QAM    albuterol-ipratropium  1 puff Inhalation BID    sodium chloride flush  10 mL Intravenous 2 times per day    heparin (porcine)  5,000 Units Subcutaneous 3 times per day    insulin lispro  0-6 Units Subcutaneous TID WC    insulin lispro  0-3 Units Subcutaneous Nightly       sodium chloride flush 10 mL PRN   acetaminophen 650 mg Q4H PRN   magnesium hydroxide 30 mL Daily PRN   ondansetron 4 mg Q6H PRN   glucose 15 g PRN   dextrose 12.5 g PRN   glucagon (rDNA) 1 mg PRN   dextrose 100 mL/hr PRN        Objective:    /87   Pulse 86   Temp 98.6 °F (37 °C) (Oral)   Resp 20   Ht 5' 3\" (1.6 m)   Wt 204 lb 2.3 oz (92.6 kg)   SpO2 96%   BMI 36.16 kg/m²     General Appearance: alert and oriented to person, place and time and in no acute distress.  Speaks very limitid English  Skin: warm and dry  Head: normocephalic and atraumatic  Neck: neck supple and non tender without mass   Pulmonary/Chest: Diminished bilaterally- no wheezes, rales or rhonchi, normal air movement, no respiratory distress  Cardiovascular: normal rate, normal S1 and S2 and no carotid bruits  Abdomen: soft, non-tender, non-distended, normal bowel sounds, no masses or organomegaly  Extremities: no cyanosis, no clubbing +3-+4 edema to bilateral lower extremities  Neurologic: no cranial nerve deficit and speech normal        Recent Labs      02/21/18   1350  02/22/18   0039   NA  135  138   K  4.3  4.0   CL  99  102   CO2  20*  27   BUN  78*  36*   CREATININE  5.0*  2.9*   GLUCOSE  327*  178*   CALCIUM  7.6*  7.5*       Recent Labs      02/21/18   1350  02/22/18   0039  02/22/18   1058   WBC  9.3  6.1  6.5   RBC  2.61*  2.30*  2.55*   HGB  8.5*  7.4*  8.2*   HCT  24.4*  21.6*  23.6*   MCV  93.5  93.9  92.5   MCH  32.6  32.2  32.2   MCHC  34.8*  34.3  34.7*   RDW  14.4  14.3  14.2   PLT  139  117*  128*   MPV  13.1*  12.7*  12.7*         Assessment:    Principal Problem:    Fluid overload  Active Problems:    Diabetes mellitus (HCC)    Essential hypertension    [facility] ([facility]    Anemia  Resolved Problems:    * No resolved hospital problems. *      Plan:  1. Shortness of breath: due to Fluid overload from  missing dialysis. we will start the patient on dialysis. Nephrology managing. VQ scan was low probability for PE, CT of the chest showed moderate left sided pleural effusion, no signs of pneumonia. 2.  ESRD:  started on dialysis 3 weeks ago when patient was in [facility]. He has never received dialysis in the [facility]. Nephrology following. SW  arranging for outpatient dialysis. 3.  History of hypertension: blood pressure is above goal, will resume dialysis, resume amlodipine. Monitor vitals. 4.  Anemia of chronic disease/end-stage renal disease: hemoglobin is 8.2, continue monitoring. 5.  History of diabetes: was on metformin and sliding scale insulin. currently the patient reports he is not taking any hypoglycemic agents. Continue on low-dose SSI, last A1c we have in our system was more than a year ago at 9.9, I will repeat A1c-7.3. 6.  Deconditioning: the patient declined with his functional status ever since he had his second toe of the right foot amputated back in October 2016. PT/OT to evaluate.         Electronically signed by Carmen Diego CNP on 2/23/2018 at 3:15

## 2018-02-23 NOTE — PROGRESS NOTES
Nephrology   Note  Patient's Name: Madison Mai  9:35 AM  2/23/2018    Nephrologist: Jordan Michelle    Reason for Consult:  Proteinuria  Requesting Physician:  No primary care provider on file. Chief Complaint:  Edema    History Obtained From:  patient, spouse/SO and relative(s)    History of Present Ilness:    Madison Mai is a 78 y.o. male with no known history of CKD but a 12yr hx of DM2 with associated neuropathy and retinopathy who presented to the ER with dyspnea and edema. He was in [facility]  And started on RRT apprx 4 weeks. He is a [facility] resident and returned here to the [facility] over the last 24hrs. He flew from [facility] to [place] and missed his connecting flight and spent the [facility] in the airport  And landed today after a 26hr trip. He has not dialyzed for the last 3 days. He has ongoing edema in the LE. He was also recently treated in [place] ,for pneumonia. He has a HX of PAD  recently hospitalized for gangrene of the L 2nd toe. Mychal mckeon he is resting comfortably in bed and denies dyspnea or CP    2/23/18: Pt seen on IHD this Am tolerating the procedure well, no new coplaints        Past Medical History:   Diagnosis Date    CAP (community acquired pneumonia) 2/22/2016    [facility]     Dialysis patient ([facility]     Hypertension     Pneumonia        Past Surgical History:   Procedure Laterality Date    OTHER SURGICAL HISTORY Right 10/24/2016    right foot I&D with bone debridement and biopsy partial second digit amputation       Family History   Problem Relation Age of Onset    No Known Problems Mother     No Known Problems Father     No Known Problems Sister     No Known Problems Brother         reports that he has never smoked. He does not have any smokeless tobacco history on file. He reports that he does not drink alcohol or use drugs. Allergies:  Review of patient's allergies indicates no known allergies.     Current Medications:      darbepoetin jesús-polysorbate (ARANESP) injection 60 mcg Weekly   amLODIPine (NORVASC) tablet 5 mg QAM   bumetanide (BUMEX) tablet 1 mg Lunch   calcium-vitamin D (OSCAL-500) 500-200 MG-UNIT per tablet 1 tablet QAM   albuterol-ipratropium (COMBIVENT RESPIMAT)  MCG/ACT inhaler 1 puff BID   sodium chloride flush 0.9 % injection 10 mL 2 times per day   sodium chloride flush 0.9 % injection 10 mL PRN   acetaminophen (TYLENOL) tablet 650 mg Q4H PRN   magnesium hydroxide (MILK OF MAGNESIA) 400 MG/5ML suspension 30 mL Daily PRN   ondansetron (ZOFRAN) injection 4 mg Q6H PRN   heparin (porcine) injection 5,000 Units 3 times per day   insulin lispro (HUMALOG) injection vial 0-6 Units TID WC   insulin lispro (HUMALOG) injection vial 0-3 Units Nightly   glucose (GLUTOSE) 40 % oral gel 15 g PRN   dextrose 50 % solution 12.5 g PRN   glucagon (rDNA) injection 1 mg PRN   dextrose 5 % solution PRN       Review of Systems:   Pertinent items are noted in HPI.  Remainder of a  Complete review of systems is otherwise (-)    Physical exam:   Constitutional: older hispanaic male   VITALS:    BP (!) 155/94   Pulse 85   Temp 99.9 °F (37.7 °C)   Resp 16   Ht 5' 3\" (1.6 m)   Wt 204 lb 2.3 oz (92.6 kg)   SpO2 100%   BMI 36.16 kg/m²   CURRENT TEMPERATURE:  Temp: 99.9 °F (37.7 °C)  MAXIMUM TEMPERATURE OVER 24HRS:  Temp (24hrs), Av.1 °F (37.3 °C), Min:98.3 °F (36.8 °C), Max:99.9 °F (37.7 °C)    TEMPERATURE RANGE OVER 24HRS:   Temp  Av.1 °F (37.3 °C)  Min: 98.3 °F (36.8 °C)  Max: 99.9 °F (37.7 °C)  CURRENT RESPIRATORY RATE:  Resp: 16  CURRENT PULSE:  Pulse: 85  24HR PULSE RANGE: Pulse  Av.5  Min: 75  Max: 87  CURRENT BLOOD PRESSURE:  BP: (!) 155/94    24HR BLOOD PRESSURE RANGE:  Systolic (91CAK), IQY:954 , Min:85 , VEK:521   ; Diastolic (30UOP), IMP:96, Min:58, Max:94    8HR BLOOD PRESSURE RANGE:  BP: (138-155)/(81-94)   CURRENT PULSE OXIMETRY:  SpO2: 100 %  24HR PULSE OXIMETRY RANGE:  SpO2  Av.5 %  Min: 99 %  Max: 100 %  Gen: alert, awake, continued HD 2/24 for vol removal-setting up out pt IHD at [facility] as the pt lives in [place]    2-Hypervolemia with pl eff and peripheral edema-(-) US for DVT-V/Q low probability-recent hx of pne-on ceftriaxone and doxycycline    3-Anion Gap met Acidosis in the setting of the ESRD and missed IHD-improved HCO3 with resumption of reg IHD treatments    4- Anemia in CKD-11/13/16 ferritin 250 , % Fe++ Sat 23 , L90=103 and folate 13-(-)  SPEP and UPEP-started ARNOLDO as the HgB <10- Ferritin 485 with iron sat 20%, B12 and  Folate adequate -HgB stable at 8.2 -Thrombocytopenia with the PLT down from 139 --> 117-->128-will follow    5-HTN with CKD V/ESRD-suboptimally controlled nearing  the goal <140/90 with  the vol overload-will follow with the ultrafiltration -no new additional meds    6-Secondary HPTH with hypocalcemia in the setting of the hypoalbuminemia-low PO4 and low  ionized Calcium will supplement the PO4 via nutrition and hold binders-will supplement Ca++ via IHD 10/23/16 PTH 72  low Vit D 8 -currently  in goal range, Vit D 5 will supplement    Thank you Dr. Thurnell Goodpasture for allowing us to participate in care of Mariella Anderson  9:35 AM  2/23/2018

## 2018-02-23 NOTE — PROGRESS NOTES
[facility] Hospitalist Progress Note    Admitting Date and Time: 2/21/2018  6:19 PM  Admit Dx: ESRD ON DIALYSIS    Subjective:    Pt denies fever, chills, cp, sob, n/v, pain or sob.  darbepoetin jesús-polysorbate  60 mcg Subcutaneous Weekly    amLODIPine  5 mg Oral QAM    bumetanide  1 mg Oral Lunch    calcium-vitamin D  1 tablet Oral QAM    albuterol-ipratropium  1 puff Inhalation BID    sodium chloride flush  10 mL Intravenous 2 times per day    heparin (porcine)  5,000 Units Subcutaneous 3 times per day    insulin lispro  0-6 Units Subcutaneous TID WC    insulin lispro  0-3 Units Subcutaneous Nightly       sodium chloride flush 10 mL PRN   acetaminophen 650 mg Q4H PRN   magnesium hydroxide 30 mL Daily PRN   ondansetron 4 mg Q6H PRN   glucose 15 g PRN   dextrose 12.5 g PRN   glucagon (rDNA) 1 mg PRN   dextrose 100 mL/hr PRN        Objective:        PHYSICAL EXAM:    Vitals:  BP (!) 118/58   Pulse 82   Temp 98.3 °F (36.8 °C) (Oral)   Resp 16   Ht 5' 3\" (1.6 m)   Wt 198 lb 3.1 oz (89.9 kg)   SpO2 99%   BMI 35.11 kg/m²     General:  Appears comfortable. No acute distress. Answers questions appropriately and cooperative with exam  HEENT:  Mucous membranes moist. No erythema, rhinorrhea, or post-nasal drip noted. Neck:  No carotid bruits. Heart:  Rhythm regular at rate of 84  Lungs:  CTA but diminished. No wheeze, rales, or rhonchi  Abdomen:  Positive bowel sounds positive. Soft. Non-tender. No guarding, rebound or rigidity. Breast/Rectal/Genitourinary: not pertinent. Extremities:  Pos for 3-4+ b/l lower extremity edema  Skin:  Warm and dry  Vascular: 2/4 Dorsalis Pedis pulses bilaterally. Neuro:  Cranial nerves 2-12 grossly intact, no focal weakness or change in sensation noted. Extraocular muscles intact. Pupils equal, round, reactive to light.               Recent Labs      02/21/18   1350  02/22/18   0039   NA  135  138   K  4.3  4.0   CL  99  102   CO2  20*  27   BUN  78*  36*

## 2018-02-23 NOTE — PROGRESS NOTES
decreased independence with functional mobility/transfers, decreased independence with bed mobility, decreased standing balance, decreased activity tolerance, and decreased B UE strength. Patient would benefit from continued OT services upon discharge in order to maximize independence/safety with ADLs/IADLs, functional transfers and mobility, along with other functional tasks of choice. Need for consistent assistance with ADLs/IADLs anticipated upon discharge to ensure patient's safety. Comments: Upon this OTR's arrival to patient's room, patient seated in bedside chair. Upon conclusion of this session, patient seated in bedside chair with call light in reach, B LEs elevated, and family members present. During the start and prior to conclusion of this session, environmental modifications (including line management, as appropriate) were completed for patient's safety and efficiency of treatment session. Patient Education: Throughout this session, patient education was provided regarding proper hand placement during functional transfers, techniques to maximize safety, importance of having assistance with functional transfers/mobility during hospitalization, call light use, and OT plan of care; patient demonstrated limited understanding. Occupations Limited By Various Client Factors and Performance Skills:  ADLs - bathing, dressing, toileting, grooming X   IADLs X   Rest and Sleep    Education    Work    Play    Leisure    Social Participation      Patient / Family Goal: Patient did not state a goal.     Treatment Plan: Skilled OT treatment to be provided for ADL re-training, neuromuscular re-education, functional transfer/mobility training, cognitive re-training, equipment needs, energy conservation techniques, patient and/or family education/training, environmental modifications, compensatory techniques for ADL performance, and any splinting/positioning needs, all as needed.  When appropriate, clear delegation orders will be provided for nursing staff. Goals:  1. Patient will perform grooming tasks independently while standing at sink within 1 week. 2. Patient will perform upper body dressing/bathing tasks independently, including item retrieval, within 1 week. 3. Patient will demonstrate 4+/5 BUE strength within 2 weeks in order to maximize independence with ADLs. 4. Patient will perform lower body dressing/bathing with modified independence, with use of AE/DME as needed/appropriate, within 2 weeks. 5. Patient will perform all aspects of toileting independently within 2 weeks. 6. Patient will perform functional transfers independently within 2 weeks in order to maximize independence with ADLs. 7. Patient will perform functional mobility independently within 2 weeks in order to maximize independence with ADLs. 8. Patient will demonstrate Good understanding and consistent implementation of energy conservation techniques and work simplification techniques into ADL routines. Rehab Potential: Good    Patient and/or family indicated understanding of this OT plan of care: Yes    Time Out: 3:03pm    OT Evaluation Complexity Level: Low  This level of OT evaluation was determined based upon the of the following: complexity of occupational profile and review of medical/therapy history completed, number of performance deficits demonstrated, and level of clinical decision making required to develop this OT plan of care. Low Complexity Medium Complexity High Complexity Comments:   Occupational Profile and Medical/Therapy History X   Brief review of patient's medical/therapy history completed. Number of Performance Deficits  X  Patient demonstrates five performance deficits. Clinical Decision Making X   Clinical decision making of low analytic complexity needed for development of this OT plan of care. Patient demonstrates few comorbidities that affect occupational performance.  Minimal

## 2018-02-24 LAB
ALBUMIN SERPL-MCNC: 2.4 G/DL (ref 3.5–5.2)
ALP BLD-CCNC: 77 U/L (ref 40–129)
ALT SERPL-CCNC: 32 U/L (ref 0–40)
ANION GAP SERPL CALCULATED.3IONS-SCNC: 9 MMOL/L (ref 7–16)
AST SERPL-CCNC: 20 U/L (ref 0–39)
BILIRUB SERPL-MCNC: 0.5 MG/DL (ref 0–1.2)
BUN BLDV-MCNC: 22 MG/DL (ref 8–23)
CALCIUM IONIZED: 1.07 MMOL/L (ref 1.15–1.33)
CALCIUM SERPL-MCNC: 7.4 MG/DL (ref 8.6–10.2)
CHLORIDE BLD-SCNC: 98 MMOL/L (ref 98–107)
CO2: 29 MMOL/L (ref 22–29)
CREAT SERPL-MCNC: 2.8 MG/DL (ref 0.7–1.2)
CULTURE, RESPIRATORY: ABNORMAL
CULTURE, RESPIRATORY: ABNORMAL
GFR AFRICAN AMERICAN: 28
GFR NON-AFRICAN AMERICAN: 23 ML/MIN/1.73
GLUCOSE BLD-MCNC: 159 MG/DL (ref 74–109)
HCT VFR BLD CALC: 22.9 % (ref 37–54)
HEMOGLOBIN: 7.6 G/DL (ref 12.5–16.5)
MAGNESIUM: 1.7 MG/DL (ref 1.6–2.6)
MCH RBC QN AUTO: 31.8 PG (ref 26–35)
MCHC RBC AUTO-ENTMCNC: 33.2 % (ref 32–34.5)
MCV RBC AUTO: 95.8 FL (ref 80–99.9)
METER GLUCOSE: 188 MG/DL (ref 70–110)
METER GLUCOSE: 235 MG/DL (ref 70–110)
METER GLUCOSE: 269 MG/DL (ref 70–110)
ORGANISM: ABNORMAL
PDW BLD-RTO: 13.9 FL (ref 11.5–15)
PHOSPHORUS: 1.6 MG/DL (ref 2.5–4.5)
PLATELET # BLD: 134 E9/L (ref 130–450)
PMV BLD AUTO: 12.6 FL (ref 7–12)
POTASSIUM SERPL-SCNC: 4.2 MMOL/L (ref 3.5–5)
RBC # BLD: 2.39 E12/L (ref 3.8–5.8)
SMEAR, RESPIRATORY: ABNORMAL
SODIUM BLD-SCNC: 136 MMOL/L (ref 132–146)
TOTAL PROTEIN: 5 G/DL (ref 6.4–8.3)
WBC # BLD: 7 E9/L (ref 4.5–11.5)

## 2018-02-24 PROCEDURE — 82962 GLUCOSE BLOOD TEST: CPT

## 2018-02-24 PROCEDURE — 94640 AIRWAY INHALATION TREATMENT: CPT

## 2018-02-24 PROCEDURE — 80053 COMPREHEN METABOLIC PANEL: CPT

## 2018-02-24 PROCEDURE — 82330 ASSAY OF CALCIUM: CPT

## 2018-02-24 PROCEDURE — 85027 COMPLETE CBC AUTOMATED: CPT

## 2018-02-24 PROCEDURE — 36415 COLL VENOUS BLD VENIPUNCTURE: CPT

## 2018-02-24 PROCEDURE — 83735 ASSAY OF MAGNESIUM: CPT

## 2018-02-24 PROCEDURE — 9990 CHARGE CONVERSION

## 2018-02-24 PROCEDURE — G0257 UNSCHED DIALYSIS ESRD PT HOS: HCPCS

## 2018-02-24 PROCEDURE — 84100 ASSAY OF PHOSPHORUS: CPT

## 2018-02-24 RX ORDER — BUMETANIDE 1 MG/1
2 TABLET ORAL 2 TIMES DAILY
Status: DISCONTINUED | OUTPATIENT
Start: 2018-02-24 | End: 2018-03-16 | Stop reason: HOSPADM

## 2018-02-24 RX ORDER — OYSTER SHELL CALCIUM WITH VITAMIN D 500; 200 MG/1; [IU]/1
1 TABLET, FILM COATED ORAL 2 TIMES DAILY
Status: DISCONTINUED | OUTPATIENT
Start: 2018-02-24 | End: 2018-03-01

## 2018-02-24 RX ADMIN — BUMETANIDE 1 MG: 1 TABLET ORAL at 12:41

## 2018-02-24 RX ADMIN — INSULIN LISPRO 2 UNITS: 100 INJECTION, SOLUTION INTRAVENOUS; SUBCUTANEOUS at 17:17

## 2018-02-24 RX ADMIN — INSULIN LISPRO 2 UNITS: 100 INJECTION, SOLUTION INTRAVENOUS; SUBCUTANEOUS at 20:47

## 2018-02-24 RX ADMIN — AMLODIPINE BESYLATE 5 MG: 5 TABLET ORAL at 12:40

## 2018-02-24 RX ADMIN — HEPARIN SODIUM 5000 UNITS: 10000 INJECTION, SOLUTION INTRAVENOUS; SUBCUTANEOUS at 06:13

## 2018-02-24 RX ADMIN — OYSTER SHELL CALCIUM WITH VITAMIN D 1 TABLET: 500; 200 TABLET, FILM COATED ORAL at 20:46

## 2018-02-24 RX ADMIN — HEPARIN SODIUM 5000 UNITS: 10000 INJECTION, SOLUTION INTRAVENOUS; SUBCUTANEOUS at 20:45

## 2018-02-24 RX ADMIN — HEPARIN SODIUM 5000 UNITS: 10000 INJECTION, SOLUTION INTRAVENOUS; SUBCUTANEOUS at 12:40

## 2018-02-24 RX ADMIN — BUMETANIDE 2 MG: 1 TABLET ORAL at 20:45

## 2018-02-24 ASSESSMENT — PAIN SCALES - GENERAL
PAINLEVEL_OUTOF10: 0

## 2018-02-24 NOTE — PROGRESS NOTES
Nephrology   Note  Patient's Name: Jeanine Rocha  11:41 AM  2/24/2018        Reason for Consult:  Proteinuria  Requesting Physician:  No primary care provider on file. Chief Complaint:  Edema    History Obtained From:  patient, spouse/SO and relative(s)    History of Present Ilness:    Jeanine Rocha is a 78 y.o. male with no known history of CKD but a 12yr hx of DM2 with associated neuropathy and retinopathy who presented to the ER with dyspnea and edema. He was in [facility]  And started on RRT apprx 4 weeks. He is a [facility] resident and returned here to the [facility] over the last 24hrs. He flew from [facility] to [place] and missed his connecting flight and spent the [facility] in the airport  And landed today after a 26hr trip. He has not dialyzed for the last 3 days. He has ongoing edema in the LE. He was also recently treated in [place] ,for pneumonia. He has a HX of PAD  recently hospitalized for gangrene of the L 2nd toe. Merribe Borders tly he is resting comfortably in bed and denies dyspnea or CP    2/23/18: Pt seen on IHD this Am tolerating the procedure well, no new coplaints    2/24/18- seen on HD, per staff no issues with treatment. He does not speak a great deal of english, language barrier. Allergies:  Review of patient's allergies indicates no known allergies.     Current Medications:      darbepoetin jesús-polysorbate (ARANESP) injection 60 mcg Weekly   amLODIPine (NORVASC) tablet 5 mg QAM   bumetanide (BUMEX) tablet 1 mg Lunch   calcium-vitamin D (OSCAL-500) 500-200 MG-UNIT per tablet 1 tablet QAM   albuterol-ipratropium (COMBIVENT RESPIMAT)  MCG/ACT inhaler 1 puff BID   sodium chloride flush 0.9 % injection 10 mL 2 times per day   sodium chloride flush 0.9 % injection 10 mL PRN   acetaminophen (TYLENOL) tablet 650 mg Q4H PRN   magnesium hydroxide (MILK OF MAGNESIA) 400 MG/5ML suspension 30 mL Daily PRN   ondansetron (ZOFRAN) injection 4 mg Q6H PRN   heparin (porcine) injection 5,000 Units 3 times per 02/24/2018    HCT 22.9 02/24/2018    MCV 95.8 02/24/2018    MCH 31.8 02/24/2018    MCHC 33.2 02/24/2018    RDW 13.9 02/24/2018     02/24/2018    MPV 12.6 02/24/2018     CBC with Differential:    Lab Results   Component Value Date    WBC 7.0 02/24/2018    RBC 2.39 02/24/2018    HGB 7.6 02/24/2018    HCT 22.9 02/24/2018     02/24/2018    MCV 95.8 02/24/2018    MCH 31.8 02/24/2018    MCHC 33.2 02/24/2018    RDW 13.9 02/24/2018    LYMPHOPCT 11.1 02/22/2018    MONOPCT 8.8 02/22/2018    BASOPCT 0.0 02/22/2018    MONOSABS 0.57 02/22/2018    LYMPHSABS 0.72 02/22/2018    EOSABS 0.09 02/22/2018    BASOSABS 0.00 02/22/2018     Hemoglobin/Hematocrit:    Lab Results   Component Value Date    HGB 7.6 02/24/2018    HCT 22.9 02/24/2018     CMP:    Lab Results   Component Value Date     02/24/2018    K 4.2 02/24/2018    CL 98 02/24/2018    CO2 29 02/24/2018    BUN 22 02/24/2018    CREATININE 2.8 02/24/2018    GFRAA 28 02/24/2018    LABGLOM 23 02/24/2018    GLUCOSE 159 02/24/2018    PROT 5.0 02/24/2018    LABALBU 2.4 02/24/2018    CALCIUM 7.4 02/24/2018    BILITOT 0.5 02/24/2018    ALKPHOS 77 02/24/2018    AST 20 02/24/2018    ALT 32 02/24/2018     BMP:    Lab Results   Component Value Date     02/24/2018    K 4.2 02/24/2018    CL 98 02/24/2018    CO2 29 02/24/2018    BUN 22 02/24/2018    LABALBU 2.4 02/24/2018    CREATININE 2.8 02/24/2018    CALCIUM 7.4 02/24/2018    GFRAA 28 02/24/2018    LABGLOM 23 02/24/2018    GLUCOSE 159 02/24/2018     BUN/Creatinine:    Lab Results   Component Value Date    BUN 22 02/24/2018    CREATININE 2.8 02/24/2018     Hepatic Function Panel:    Lab Results   Component Value Date    ALKPHOS 77 02/24/2018    ALT 32 02/24/2018    AST 20 02/24/2018    PROT 5.0 02/24/2018    BILITOT 0.5 02/24/2018    BILIDIR 0.3 02/19/2016    IBILI 0.7 02/19/2016    LABALBU 2.4 02/24/2018     Albumin:    Lab Results   Component Value Date    LABALBU 2.4 02/24/2018     Calcium:    Lab Results patient continues to have significant anemia with chronic kidney disease. He is on at rest. He developed worsening hypophosphatemia with dialysis and he is currently on phosphate supplement and I will repeat phosphorus tomorrow. The patient has mild hypocalcemia and he is on calcium with vitamin D supplementation not with meals to avoid phosphate binding. He continues to hypertension with chronic kidney disease, hoping with improvement with further fluid removal on dialysis and maybe with Bumex as well.     Marlon Carver

## 2018-02-24 NOTE — PROGRESS NOTES
[facility] Hospitalist Progress Note    Admitting Date and Time: 2/21/2018  6:19 PM  Admit Dx: ESRD ON DIALYSIS    Subjective: Patient seen resting in bed while at dialysis. No family present today. Shakes head no to pain or SOB. Appears comfortable. No issues per nursing staff. ROS: denies fever, chills, cp, sob, n/v, HA unless stated above.  calcium-vitamin D  1 tablet Oral BID    potassium & sodium phosphates  1 packet Oral BID    darbepoetin jesús-polysorbate  60 mcg Subcutaneous Weekly    amLODIPine  5 mg Oral QAM    bumetanide  1 mg Oral Lunch    albuterol-ipratropium  1 puff Inhalation BID    sodium chloride flush  10 mL Intravenous 2 times per day    heparin (porcine)  5,000 Units Subcutaneous 3 times per day    insulin lispro  0-6 Units Subcutaneous TID WC    insulin lispro  0-3 Units Subcutaneous Nightly       sodium chloride flush 10 mL PRN   acetaminophen 650 mg Q4H PRN   magnesium hydroxide 30 mL Daily PRN   ondansetron 4 mg Q6H PRN   glucose 15 g PRN   dextrose 12.5 g PRN   glucagon (rDNA) 1 mg PRN   dextrose 100 mL/hr PRN        Objective:    BP (!) 160/89   Pulse 94   Temp 98.4 °F (36.9 °C) (Oral)   Resp 16   Ht 5' 3\" (1.6 m)   Wt 215 lb 13.3 oz (97.9 kg)   SpO2 98%   BMI 38.23 kg/m²       General Appearance: alert and oriented to person, place and time and in no acute distress.  Speaks very limitid English  Skin: warm and dry  Head: normocephalic and atraumatic  Neck: neck supple and non tender without mass   Pulmonary/Chest: Diminished bilaterally- no wheezes, rales or rhonchi, normal air movement, no respiratory distress  Cardiovascular: normal rate, normal S1 and S2 and no carotid bruits  Abdomen: soft, non-tender, non-distended, normal bowel sounds, no masses or organomegaly  Extremities: no cyanosis, no clubbing +3-+4 edema to bilateral lower extremities  Neurologic: no cranial nerve deficit and speech normal      Recent Labs      02/22/18   0039  02/24/18   [id number]

## 2018-02-25 LAB
ANION GAP SERPL CALCULATED.3IONS-SCNC: 8 MMOL/L (ref 7–16)
BUN BLDV-MCNC: 29 MG/DL (ref 8–23)
CALCIUM IONIZED: 1.2 MMOL/L (ref 1.15–1.33)
CALCIUM SERPL-MCNC: 8.2 MG/DL (ref 8.6–10.2)
CHLORIDE BLD-SCNC: 99 MMOL/L (ref 98–107)
CO2: 29 MMOL/L (ref 22–29)
CREAT SERPL-MCNC: 3.3 MG/DL (ref 0.7–1.2)
GFR AFRICAN AMERICAN: 23
GFR NON-AFRICAN AMERICAN: 19 ML/MIN/1.73
GLUCOSE BLD-MCNC: 229 MG/DL (ref 74–109)
HCT VFR BLD CALC: 21.6 % (ref 37–54)
HEMOGLOBIN: 7.1 G/DL (ref 12.5–16.5)
MAGNESIUM: 1.8 MG/DL (ref 1.6–2.6)
MCH RBC QN AUTO: 31.8 PG (ref 26–35)
MCHC RBC AUTO-ENTMCNC: 32.9 % (ref 32–34.5)
MCV RBC AUTO: 96.9 FL (ref 80–99.9)
METER GLUCOSE: 157 MG/DL (ref 70–110)
METER GLUCOSE: 244 MG/DL (ref 70–110)
METER GLUCOSE: 264 MG/DL (ref 70–110)
METER GLUCOSE: 304 MG/DL (ref 70–110)
PDW BLD-RTO: 13.9 FL (ref 11.5–15)
PHOSPHORUS: 2 MG/DL (ref 2.5–4.5)
PLATELET # BLD: 145 E9/L (ref 130–450)
PMV BLD AUTO: 13.2 FL (ref 7–12)
POTASSIUM SERPL-SCNC: 4.8 MMOL/L (ref 3.5–5)
RBC # BLD: 2.23 E12/L (ref 3.8–5.8)
SODIUM BLD-SCNC: 136 MMOL/L (ref 132–146)
WBC # BLD: 7.6 E9/L (ref 4.5–11.5)

## 2018-02-25 PROCEDURE — 85027 COMPLETE CBC AUTOMATED: CPT

## 2018-02-25 PROCEDURE — 84100 ASSAY OF PHOSPHORUS: CPT

## 2018-02-25 PROCEDURE — 80048 BASIC METABOLIC PNL TOTAL CA: CPT

## 2018-02-25 PROCEDURE — 82330 ASSAY OF CALCIUM: CPT

## 2018-02-25 PROCEDURE — 82962 GLUCOSE BLOOD TEST: CPT

## 2018-02-25 PROCEDURE — 9990 CHARGE CONVERSION

## 2018-02-25 PROCEDURE — 94640 AIRWAY INHALATION TREATMENT: CPT

## 2018-02-25 PROCEDURE — 83735 ASSAY OF MAGNESIUM: CPT

## 2018-02-25 PROCEDURE — 36592 COLLECT BLOOD FROM PICC: CPT

## 2018-02-25 PROCEDURE — 36415 COLL VENOUS BLD VENIPUNCTURE: CPT

## 2018-02-25 RX ADMIN — HEPARIN SODIUM 5000 UNITS: 10000 INJECTION, SOLUTION INTRAVENOUS; SUBCUTANEOUS at 06:55

## 2018-02-25 RX ADMIN — HEPARIN SODIUM 5000 UNITS: 10000 INJECTION, SOLUTION INTRAVENOUS; SUBCUTANEOUS at 22:52

## 2018-02-25 RX ADMIN — Medication 10 ML: at 21:01

## 2018-02-25 RX ADMIN — BUMETANIDE 2 MG: 1 TABLET ORAL at 08:43

## 2018-02-25 RX ADMIN — OYSTER SHELL CALCIUM WITH VITAMIN D 1 TABLET: 500; 200 TABLET, FILM COATED ORAL at 20:58

## 2018-02-25 RX ADMIN — INSULIN LISPRO 1 UNITS: 100 INJECTION, SOLUTION INTRAVENOUS; SUBCUTANEOUS at 20:58

## 2018-02-25 RX ADMIN — AMLODIPINE BESYLATE 5 MG: 5 TABLET ORAL at 08:43

## 2018-02-25 RX ADMIN — INSULIN LISPRO 3 UNITS: 100 INJECTION, SOLUTION INTRAVENOUS; SUBCUTANEOUS at 16:47

## 2018-02-25 RX ADMIN — INSULIN LISPRO 2 UNITS: 100 INJECTION, SOLUTION INTRAVENOUS; SUBCUTANEOUS at 08:43

## 2018-02-25 RX ADMIN — INSULIN LISPRO 4 UNITS: 100 INJECTION, SOLUTION INTRAVENOUS; SUBCUTANEOUS at 12:14

## 2018-02-25 RX ADMIN — BUMETANIDE 2 MG: 1 TABLET ORAL at 20:58

## 2018-02-25 RX ADMIN — Medication 10 ML: at 08:42

## 2018-02-25 RX ADMIN — OYSTER SHELL CALCIUM WITH VITAMIN D 1 TABLET: 500; 200 TABLET, FILM COATED ORAL at 08:43

## 2018-02-25 RX ADMIN — Medication 10 ML: at 09:06

## 2018-02-25 ASSESSMENT — PAIN SCALES - GENERAL
PAINLEVEL_OUTOF10: 0

## 2018-02-25 NOTE — PROGRESS NOTES
[facility] Hospitalist Progress Note    Admitting Date and Time: 2/21/2018  6:19 PM  Admit Dx: ESRD ON DIALYSIS    Subjective: Patient seen sitting up in the chair. No acute distress. Many family members at bedside. Patient speaks few words of English, family to assist with translating. Denies pain or SOB. Per son he has filled out Medicaid paperwork on Friday, hoping to get an update tomorrow      ROS: denies fever, chills, cp, sob, n/v, HA unless stated above.  calcium-vitamin D  1 tablet Oral BID    bumetanide  2 mg Oral BID    darbepoetin jesús-polysorbate  60 mcg Subcutaneous Weekly    amLODIPine  5 mg Oral QAM    albuterol-ipratropium  1 puff Inhalation BID    sodium chloride flush  10 mL Intravenous 2 times per day    heparin (porcine)  5,000 Units Subcutaneous 3 times per day    insulin lispro  0-6 Units Subcutaneous TID WC    insulin lispro  0-3 Units Subcutaneous Nightly       sodium chloride flush 10 mL PRN   acetaminophen 650 mg Q4H PRN   magnesium hydroxide 30 mL Daily PRN   ondansetron 4 mg Q6H PRN   glucose 15 g PRN   dextrose 12.5 g PRN   glucagon (rDNA) 1 mg PRN   dextrose 100 mL/hr PRN        Objective:    BP (!) 151/83   Pulse 84   Temp 98.5 °F (36.9 °C) (Oral)   Resp 16   Ht 5' 3\" (1.6 m)   Wt 192 lb (87.1 kg)   SpO2 95%   BMI 34.01 kg/m²     General Appearance: alert and oriented to person, place and time and in no acute distress.  Speaks very limited English  Skin: warm and dry  Head: normocephalic and atraumatic  Neck: neck supple and non tender without mass   Pulmonary/Chest: Diminished bilaterally- no wheezes, rales or rhonchi, normal air movement, no respiratory distress  Cardiovascular: normal rate, normal S1 and S2 and no carotid bruits  Abdomen: soft, non-tender, non-distended, normal bowel sounds, no masses or organomegaly  Extremities: no cyanosis, no clubbing +2-+3 edema to bilateral lower extremities (improving)  Neurologic: no cranial nerve deficit and speech

## 2018-02-25 NOTE — PROGRESS NOTES
Nephrology   Note  Patient's Name: Bright Conner  12:31 PM  2/25/2018        Reason for Consult:  Proteinuria  Requesting Physician:  No primary care provider on file. Chief Complaint:  Edema    History Obtained From:  patient, spouse/SO and relative(s)    History of Present Ilness:    Bright Conner is a 78 y.o. male with no known history of CKD but a 12yr hx of DM2 with associated neuropathy and retinopathy who presented to the ER with dyspnea and edema. He was in [facility]  And started on RRT apprx 4 weeks. He is a [facility] resident and returned here to the [facility] over the last 24hrs. He flew from [facility] to [place] and missed his connecting flight and spent the [facility] in the airport  And landed today after a 26hr trip. He has not dialyzed for the last 3 days. He has ongoing edema in the LE. He was also recently treated in [place] ,for pneumonia. He has a HX of PAD  recently hospitalized for gangrene of the L 2nd toe. Jorge Ghotra tly he is resting comfortably in bed and denies dyspnea or CP    2/23/18: Pt seen on IHD this Am tolerating the procedure well, no new coplaints    2/24/18- seen on HD, per staff no issues with treatment. He does not speak a great deal of english, language barrier. 2/25/18- sleeping, family at bedside and updated            Allergies:  Patient has no known allergies.     Current Medications:      calcium-vitamin D (OSCAL-500) 500-200 MG-UNIT per tablet 1 tablet BID   bumetanide (BUMEX) tablet 2 mg BID   darbepoetin jesús-polysorbate (ARANESP) injection 60 mcg Weekly   amLODIPine (NORVASC) tablet 5 mg QAM   albuterol-ipratropium (COMBIVENT RESPIMAT)  MCG/ACT inhaler 1 puff BID   sodium chloride flush 0.9 % injection 10 mL 2 times per day   sodium chloride flush 0.9 % injection 10 mL PRN   acetaminophen (TYLENOL) tablet 650 mg Q4H PRN   magnesium hydroxide (MILK OF MAGNESIA) 400 MG/5ML suspension 30 mL Daily PRN   ondansetron (ZOFRAN) injection 4 mg Q6H PRN   heparin (porcine) injection 5,000 Units 3 times per day   insulin lispro (HUMALOG) injection vial 0-6 Units TID WC   insulin lispro (HUMALOG) injection vial 0-3 Units Nightly   glucose (GLUTOSE) 40 % oral gel 15 g PRN   dextrose 50 % solution 12.5 g PRN   glucagon (rDNA) injection 1 mg PRN   dextrose 5 % solution PRN       Review of Systems:   Pertinent items are noted in HPI. Remainder of a  Complete review of systems is otherwise (-)    Physical exam:   Constitutional: older hispanaic male   VITALS:    BP (!) 144/83   Pulse 80   Temp 99.7 °F (37.6 °C) (Oral)   Resp 16   Ht 5' 3\" (1.6 m)   Wt 192 lb (87.1 kg)   SpO2 96%   BMI 34.01 kg/m²   CURRENT TEMPERATURE:  Temp: 99.7 °F (37.6 °C)  MAXIMUM TEMPERATURE OVER 24HRS:  Temp (24hrs), Av.8 °F (37.1 °C), Min:98.3 °F (36.8 °C), Max:99.7 °F (37.6 °C)    TEMPERATURE RANGE OVER 24HRS:   Temp  Av.8 °F (37.1 °C)  Min: 98.3 °F (36.8 °C)  Max: 99.7 °F (37.6 °C)  CURRENT RESPIRATORY RATE:  Resp: 16  CURRENT PULSE:  Pulse: 80  24HR PULSE RANGE: Pulse  Av.5  Min: 80  Max: 94  CURRENT BLOOD PRESSURE:  BP: (!) 144/83  24HR BLOOD PRESSURE RANGE:  Systolic (07PVM), NHI:280 , Min:140 , PER:124   ; Diastolic (54AZC), STV:90, Min:72, Max:89    8HR BLOOD PRESSURE RANGE:  BP: (144)/(83)   CURRENT PULSE OXIMETRY:  SpO2: 96 %  24HR PULSE OXIMETRY RANGE:  SpO2  Av %  Min: 96 %  Max: 98 %  Gen: alert, awake, nad.  Language barrier  Skin: no rash, skin with atrophic and chonic stasis changes over the LE  Heent:  eomi, mmm, bill, nc,at  Neck: no bruits or jvd noted, no thyromegaly R IJ TDC  Cardiovascular: PMI  lat displaced   Reg no rub  no S3  Respiratory:decreased BS at the bases bilat with equal excursions  Abdomen:  +bs, soft, nt, nd, no HSM,   Ext: 2-3 + bilat  lower extremity edema  Psychiatric: mood and affect appropriate, Cr nr 2-12 grossly intact  Musculoskeletal:  Rom, muscular strength intact    Data:   Labs:  CBC:   Lab Results   Component Value Date    WBC 7.6 2018    RBC 2.23 Lab Results   Component Value Date    CALCIUM 8.2 02/25/2018     Ionized Calcium:  No results found for: IONCA  Magnesium:    Lab Results   Component Value Date    MG 1.8 02/25/2018     Phosphorus:    Lab Results   Component Value Date    PHOS 2.0 02/25/2018     LDH:  No results found for: LDH  Uric Acid:  No components found for: URIC  PT/INR:    Lab Results   Component Value Date    PROTIME 12.3 02/21/2018    INR 1.1 02/21/2018     PTT:    Lab Results   Component Value Date    APTT 27.3 02/21/2018   [APTT}  Troponin:    Lab Results   Component Value Date    TROPONINI 0.09 11/13/2016     U/A:    Lab Results   Component Value Date    COLORU Yellow 02/21/2018    PHUR 6.0 02/21/2018    LABCAST FEW 02/21/2018    WBCUA 2-5 02/21/2018    RBCUA 0-1 02/21/2018    BACTERIA FEW 02/21/2018    CLARITYU Clear 02/21/2018    SPECGRAV 1.020 02/21/2018    LEUKOCYTESUR Negative 02/21/2018    UROBILINOGEN 0.2 02/21/2018    BILIRUBINUR Negative 02/21/2018    BLOODU MODERATE 02/21/2018    GLUCOSEU 500 02/21/2018    AMORPHOUS MANY 02/19/2016     ABG:  No results found for: PHART, SBU7HML, PO2ART, SHD9NDI, BEART, THGBART, CCG3STP, G7TLSDGQ  HgBA1c:  No components found for: HGBA1C  Microalbumen/Creatinine ratio:  No components found for: RUCREAT  FLP:    Lab Results   Component Value Date    TRIG 65 11/16/2016    HDL 53 11/16/2016    LDLCALC 86 11/16/2016    LABVLDL 13 11/16/2016     TSH:    Lab Results   Component Value Date    TSH 6.250 11/15/2016     VITAMIN B12: No components found for: B12  FOLATE:    Lab Results   Component Value Date    FOLATE 5.3 02/22/2018     Iron Saturation:  No components found for: PERCENTFE  FERRITIN:    Lab Results   Component Value Date    FERRITIN 485 02/22/2018     RPR:  No results found for: RPR  ABRAM:    Lab Results   Component Value Date    ABRAM NEGATIVE 02/21/2018     AMYLASE:    Lab Results   Component Value Date    AMYLASE 185 02/21/2018     LIPASE:    Lab Results   Component Value Date    LIPASE

## 2018-02-26 LAB
ANION GAP SERPL CALCULATED.3IONS-SCNC: 10 MMOL/L (ref 7–16)
BLOOD CULTURE, ROUTINE: NORMAL
BUN BLDV-MCNC: 40 MG/DL (ref 8–23)
CALCIUM SERPL-MCNC: 8.3 MG/DL (ref 8.6–10.2)
CHLORIDE BLD-SCNC: 99 MMOL/L (ref 98–107)
CO2: 29 MMOL/L (ref 22–29)
CREAT SERPL-MCNC: 4.1 MG/DL (ref 0.7–1.2)
CULTURE, BLOOD 2: NORMAL
GFR AFRICAN AMERICAN: 18
GFR NON-AFRICAN AMERICAN: 15 ML/MIN/1.73
GLUCOSE BLD-MCNC: 337 MG/DL (ref 74–109)
HCT VFR BLD CALC: 24.4 % (ref 37–54)
HEMOGLOBIN: 8 G/DL (ref 12.5–16.5)
MAGNESIUM: 1.9 MG/DL (ref 1.6–2.6)
MCH RBC QN AUTO: 31.9 PG (ref 26–35)
MCHC RBC AUTO-ENTMCNC: 32.8 % (ref 32–34.5)
MCV RBC AUTO: 97.2 FL (ref 80–99.9)
METER GLUCOSE: 101 MG/DL (ref 70–110)
METER GLUCOSE: 141 MG/DL (ref 70–110)
METER GLUCOSE: 329 MG/DL (ref 70–110)
METER GLUCOSE: 353 MG/DL (ref 70–110)
METER GLUCOSE: 407 MG/DL (ref 70–110)
PDW BLD-RTO: 14.1 FL (ref 11.5–15)
PHOSPHORUS: 2.3 MG/DL (ref 2.5–4.5)
PLATELET # BLD: 158 E9/L (ref 130–450)
PMV BLD AUTO: 13 FL (ref 7–12)
POTASSIUM SERPL-SCNC: 4.8 MMOL/L (ref 3.5–5)
RBC # BLD: 2.51 E12/L (ref 3.8–5.8)
SODIUM BLD-SCNC: 138 MMOL/L (ref 132–146)
WBC # BLD: 6.6 E9/L (ref 4.5–11.5)

## 2018-02-26 PROCEDURE — 82962 GLUCOSE BLOOD TEST: CPT

## 2018-02-26 PROCEDURE — 9990 CHARGE CONVERSION

## 2018-02-26 PROCEDURE — 83735 ASSAY OF MAGNESIUM: CPT

## 2018-02-26 PROCEDURE — 94640 AIRWAY INHALATION TREATMENT: CPT

## 2018-02-26 PROCEDURE — 80048 BASIC METABOLIC PNL TOTAL CA: CPT

## 2018-02-26 PROCEDURE — 36415 COLL VENOUS BLD VENIPUNCTURE: CPT

## 2018-02-26 PROCEDURE — 97530 THERAPEUTIC ACTIVITIES: CPT

## 2018-02-26 PROCEDURE — 84100 ASSAY OF PHOSPHORUS: CPT

## 2018-02-26 PROCEDURE — 85027 COMPLETE CBC AUTOMATED: CPT

## 2018-02-26 RX ADMIN — BUMETANIDE 2 MG: 1 TABLET ORAL at 21:04

## 2018-02-26 RX ADMIN — OYSTER SHELL CALCIUM WITH VITAMIN D 1 TABLET: 500; 200 TABLET, FILM COATED ORAL at 21:04

## 2018-02-26 RX ADMIN — OYSTER SHELL CALCIUM WITH VITAMIN D 1 TABLET: 500; 200 TABLET, FILM COATED ORAL at 09:52

## 2018-02-26 RX ADMIN — INSULIN LISPRO 4 UNITS: 100 INJECTION, SOLUTION INTRAVENOUS; SUBCUTANEOUS at 09:52

## 2018-02-26 RX ADMIN — Medication 10 ML: at 09:53

## 2018-02-26 RX ADMIN — HEPARIN SODIUM 5000 UNITS: 10000 INJECTION, SOLUTION INTRAVENOUS; SUBCUTANEOUS at 14:19

## 2018-02-26 RX ADMIN — Medication 10 ML: at 21:04

## 2018-02-26 RX ADMIN — BUMETANIDE 2 MG: 1 TABLET ORAL at 09:52

## 2018-02-26 RX ADMIN — HEPARIN SODIUM 5000 UNITS: 10000 INJECTION, SOLUTION INTRAVENOUS; SUBCUTANEOUS at 06:26

## 2018-02-26 RX ADMIN — INSULIN LISPRO 5 UNITS: 100 INJECTION, SOLUTION INTRAVENOUS; SUBCUTANEOUS at 12:39

## 2018-02-26 RX ADMIN — INSULIN LISPRO 1 UNITS: 100 INJECTION, SOLUTION INTRAVENOUS; SUBCUTANEOUS at 17:56

## 2018-02-26 RX ADMIN — HEPARIN SODIUM 5000 UNITS: 10000 INJECTION, SOLUTION INTRAVENOUS; SUBCUTANEOUS at 21:04

## 2018-02-26 RX ADMIN — AMLODIPINE BESYLATE 5 MG: 5 TABLET ORAL at 09:52

## 2018-02-26 ASSESSMENT — PAIN SCALES - GENERAL
PAINLEVEL_OUTOF10: 0

## 2018-02-26 NOTE — PROGRESS NOTES
Nephrology Progress Note  [facility]    Reason for Consult:  ESRD  Requesting Physician:  Dr Everton Nevarez  Date of Service: 2/26/2018     Subjective    Patient seen and examined  Noted intermittent nausea  No abd pain, chest pain, sob, d/c  ROS via family          Past Medical History:        Diagnosis Date    CAP (community acquired pneumonia) 2/22/2016    [facility]     [facility]ialysis patient ([facility]     Hypertension     Pneumonia        Past Surgical History:        Procedure Laterality Date    OTHER SURGICAL HISTORY Right 10/24/2016    right foot I&D with bone debridement and biopsy partial second digit amputation       Current Medications:    Current Facility-Administered Medications: calcium-vitamin D (OSCAL-500) 500-200 MG-UNIT per tablet 1 tablet, 1 tablet, Oral, BID  bumetanide (BUMEX) tablet 2 mg, 2 mg, Oral, BID  darbepoetin jesús-polysorbate (ARANESP) injection 60 mcg, 60 mcg, Subcutaneous, Weekly  amLODIPine (NORVASC) tablet 5 mg, 5 mg, Oral, QAM  albuterol-ipratropium (COMBIVENT RESPIMAT)  MCG/ACT inhaler 1 puff, 1 puff, Inhalation, BID  sodium chloride flush 0.9 % injection 10 mL, 10 mL, Intravenous, 2 times per day  sodium chloride flush 0.9 % injection 10 mL, 10 mL, Intravenous, PRN  acetaminophen (TYLENOL) tablet 650 mg, 650 mg, Oral, Q4H PRN  magnesium hydroxide (MILK OF MAGNESIA) 400 MG/5ML suspension 30 mL, 30 mL, Oral, Daily PRN  ondansetron (ZOFRAN) injection 4 mg, 4 mg, Intravenous, Q6H PRN  heparin (porcine) injection 5,000 Units, 5,000 Units, Subcutaneous, 3 times per day  insulin lispro (HUMALOG) injection vial 0-6 Units, 0-6 Units, Subcutaneous, TID WC  insulin lispro (HUMALOG) injection vial 0-3 Units, 0-3 Units, Subcutaneous, Nightly  glucose (GLUTOSE) 40 % oral gel 15 g, 15 g, Oral, PRN  dextrose 50 % solution 12.5 g, 12.5 g, Intravenous, PRN  glucagon (rDNA) injection 1 mg, 1 mg, Intramuscular, PRN  dextrose 5 % solution, 100 mL/hr, Intravenous,

## 2018-02-26 NOTE — PROGRESS NOTES
Occupational Therapy  OCCUPATIONAL THERAPY DAILY NOTE    Date:2018  Patient Name: Shira Liz  MRN: [id number]  : 1950  Room: [address] [place] [address]A     Patient Active Problem List   Diagnosis    Diabetes mellitus ([place] [address]    Dementia with behavioral disturbance    MI (acute kidney injury) ([place] [address]    Hyperkalemia    SIADH (syndrome of inappropriate ADH production) ([place] [address]    Psychosis    Hyperglycemia    Essential hypertension    Constipation    Ascites    Bilateral leg edema    Pleural effusion, bilateral    Anasarca    Elevated troponin    Persistent atrial fibrillation (HCC)    ESRD on dialysis ([place] [address]    Fluid overload    Anemia       Subjective:  Pt in bed, agreeable to therapy and cleared with nursing  Precautions: fall risk, English speaking  Chart Reviewed:  Yes          Independent Supervision Contact Guard Assist Minimal Assist Moderate Assist Maximum Assist Dependent   Feeding          Grooming          UE  Bathing          LE Bathing          UE Dressing          LE  Dressing                    Toileting                   Comments: Mod A to don shoes, pt receives assist from family    Functional Transfers:  Supine to sit supervision. Sit to stand supervision. Functional mobility in daniel without device supervision. Stand to sit supervision. Sit to supine supervision. Therapeutic Exercises:  Good use of UEs for support during functional transfers    Other:  Good tolerance of activity. Family assists with communication. Pt returned to bed with call light in reach    Education:  safety    Equipment Recommendations:  Continue to assess    AM-PAC Inpatient Daily Activity Raw Score:  16    Pain Level:  /10   Additional Notes:  No complaints  Patient has made good progress during treatment sessions toward set goals. [x] Continue with current OT Plan of care.   [] Prepare for Discharge    Amee ALVARADO/L [id number]    Total Tx Time: 15

## 2018-02-26 NOTE — CARE COORDINATION
Social Work/Discharge Planning:  Received call from Hilario at [place] ([phone]) inquiring when patient will discharge. Informed [place] this worker will notify [facility] of discharge once payment for dialysis has been verified. Will continue to follow.   Electronically signed by CLEMENTE Colin on 2/26/2018 at 2:32 PM

## 2018-02-26 NOTE — PROGRESS NOTES
02/25/18   0855  02/26/18   0630   WBC  7.0  7.6  6.6   RBC  2.39*  2.23*  2.51*   HGB  7.6*  7.1*  8.0*   HCT  22.9*  21.6*  24.4*   MCV  95.8  96.9  97.2   MCH  31.8  31.8  31.9   MCHC  33.2  32.9  32.8   RDW  13.9  13.9  14.1   PLT  134  145  158   MPV  12.6*  13.2*  13.0*       Assessment:    Principal Problem:    Fluid overload  Active Problems:    Diabetes mellitus (HCC)    Essential hypertension    ESRD on dialysis ([facility] [address]    Anemia  Resolved Problems:    * No resolved hospital problems. *      Plan:  1.  Shortness of breath: due to Fluid overload from  missing dialysis. Continue dialysis. Nephrology managing.   VQ scan was low probability for PE, CT of the chest showed moderate left sided pleural effusion, no signs of pneumonia. 2.  ESRD:  started on dialysis 3 weeks ago when patient was in [facility]. He had never received dialysis in the [facility]. Nephrology following.  SW  arranging for outpatient dialysis. For HD today. 3.  History of hypertension: blood pressure is above goal but improving, will continue dialysis, continue amlodipine. Monitor vitals. 4.  Anemia of chronic disease/end-stage renal disease: hemoglobin is 7.1 today. Aranesp started per renal and patient received his first dose on Thursday. Discussed with family and patient is agreeable with possible transfusion if needed. Monitor H & H   5.  History of diabetes: was on metformin and sliding scale insulin. currently the patient reports he is not taking any hypoglycemic agents. Continue on low-dose SSI, last A1c we have in our system was more than a year ago at 9.9, repeat A1c-7.3. 6.  Deconditioning: the patient declined with his functional status ever since he had his second toe of the right foot amputated back in October 2016. AM-PAC score 18/24     Dispo: Discussed with social work still in the process of setting up for outpatient HD- delayed as patient has no insurance. Attempting to set up medicaid.       Electronically signed by Delmar White SABINO Sebastian on 2/26/2018 at 3:47 PM

## 2018-02-27 LAB
ANION GAP SERPL CALCULATED.3IONS-SCNC: 10 MMOL/L (ref 7–16)
BUN BLDV-MCNC: 34 MG/DL (ref 8–23)
CALCIUM SERPL-MCNC: 8.3 MG/DL (ref 8.6–10.2)
CHLORIDE BLD-SCNC: 100 MMOL/L (ref 98–107)
CO2: 28 MMOL/L (ref 22–29)
CREAT SERPL-MCNC: 3.8 MG/DL (ref 0.7–1.2)
FUNGUS IDENTIFIED: ABNORMAL
FUNGUS IDENTIFIED: ABNORMAL
GFR AFRICAN AMERICAN: 19
GFR NON-AFRICAN AMERICAN: 16 ML/MIN/1.73
GLUCOSE BLD-MCNC: 145 MG/DL (ref 74–109)
HCT VFR BLD CALC: 23.2 % (ref 37–54)
HEMOGLOBIN: 7.6 G/DL (ref 12.5–16.5)
MCH RBC QN AUTO: 31.5 PG (ref 26–35)
MCHC RBC AUTO-ENTMCNC: 32.8 % (ref 32–34.5)
MCV RBC AUTO: 96.3 FL (ref 80–99.9)
METER GLUCOSE: 174 MG/DL (ref 70–110)
METER GLUCOSE: 180 MG/DL (ref 70–110)
METER GLUCOSE: 252 MG/DL (ref 70–110)
METER GLUCOSE: 315 MG/DL (ref 70–110)
ORGANISM: ABNORMAL
PDW BLD-RTO: 14 FL (ref 11.5–15)
PHOSPHORUS: 2.1 MG/DL (ref 2.5–4.5)
PLATELET # BLD: 131 E9/L (ref 130–450)
PMV BLD AUTO: 12.5 FL (ref 7–12)
POTASSIUM SERPL-SCNC: 4.7 MMOL/L (ref 3.5–5)
RBC # BLD: 2.41 E12/L (ref 3.8–5.8)
SODIUM BLD-SCNC: 138 MMOL/L (ref 132–146)
WBC # BLD: 6.7 E9/L (ref 4.5–11.5)

## 2018-02-27 PROCEDURE — 85027 COMPLETE CBC AUTOMATED: CPT

## 2018-02-27 PROCEDURE — 36415 COLL VENOUS BLD VENIPUNCTURE: CPT

## 2018-02-27 PROCEDURE — 9990 CHARGE CONVERSION

## 2018-02-27 PROCEDURE — 86638 Q FEVER ANTIBODY: CPT

## 2018-02-27 PROCEDURE — 80048 BASIC METABOLIC PNL TOTAL CA: CPT

## 2018-02-27 PROCEDURE — 82962 GLUCOSE BLOOD TEST: CPT

## 2018-02-27 PROCEDURE — 84100 ASSAY OF PHOSPHORUS: CPT

## 2018-02-27 PROCEDURE — G0257 UNSCHED DIALYSIS ESRD PT HOS: HCPCS

## 2018-02-27 RX ADMIN — AMLODIPINE BESYLATE 5 MG: 5 TABLET ORAL at 13:14

## 2018-02-27 RX ADMIN — INSULIN LISPRO 1 UNITS: 100 INJECTION, SOLUTION INTRAVENOUS; SUBCUTANEOUS at 13:23

## 2018-02-27 RX ADMIN — HEPARIN SODIUM 5000 UNITS: 10000 INJECTION, SOLUTION INTRAVENOUS; SUBCUTANEOUS at 06:37

## 2018-02-27 RX ADMIN — HEPARIN SODIUM 5000 UNITS: 10000 INJECTION, SOLUTION INTRAVENOUS; SUBCUTANEOUS at 13:23

## 2018-02-27 RX ADMIN — INSULIN LISPRO 2 UNITS: 100 INJECTION, SOLUTION INTRAVENOUS; SUBCUTANEOUS at 22:03

## 2018-02-27 RX ADMIN — Medication 10 ML: at 22:10

## 2018-02-27 RX ADMIN — INSULIN LISPRO 4 UNITS: 100 INJECTION, SOLUTION INTRAVENOUS; SUBCUTANEOUS at 18:10

## 2018-02-27 RX ADMIN — HEPARIN SODIUM 5000 UNITS: 10000 INJECTION, SOLUTION INTRAVENOUS; SUBCUTANEOUS at 22:03

## 2018-02-27 RX ADMIN — BUMETANIDE 2 MG: 1 TABLET ORAL at 13:14

## 2018-02-27 RX ADMIN — Medication 10 ML: at 13:15

## 2018-02-27 RX ADMIN — OYSTER SHELL CALCIUM WITH VITAMIN D 1 TABLET: 500; 200 TABLET, FILM COATED ORAL at 13:14

## 2018-02-27 RX ADMIN — OYSTER SHELL CALCIUM WITH VITAMIN D 1 TABLET: 500; 200 TABLET, FILM COATED ORAL at 22:04

## 2018-02-27 RX ADMIN — BUMETANIDE 2 MG: 1 TABLET ORAL at 22:04

## 2018-02-27 ASSESSMENT — PAIN SCALES - GENERAL
PAINLEVEL_OUTOF10: 0

## 2018-02-27 NOTE — PROGRESS NOTES
[facility] Hospitalist Progress Note    Subjective:    Patient awake, alert sitting up in bed in no acute distress  Hungry- missed lunch due to HD  Family brought him in lunch  No complaints   Feels good  Multiple family at bedside     RoS: No fever, chills, headache, lightheadedness. No other new ENT, RS, CVS, GI or  sx.     calcium-vitamin D  1 tablet Oral BID    bumetanide  2 mg Oral BID    darbepoetin jesús-polysorbate  60 mcg Subcutaneous Weekly    amLODIPine  5 mg Oral QAM    albuterol-ipratropium  1 puff Inhalation BID    sodium chloride flush  10 mL Intravenous 2 times per day    heparin (porcine)  5,000 Units Subcutaneous 3 times per day    insulin lispro  0-6 Units Subcutaneous TID WC    insulin lispro  0-3 Units Subcutaneous Nightly       sodium chloride flush 10 mL PRN   acetaminophen 650 mg Q4H PRN   magnesium hydroxide 30 mL Daily PRN   ondansetron 4 mg Q6H PRN   glucose 15 g PRN   dextrose 12.5 g PRN   glucagon (rDNA) 1 mg PRN   dextrose 100 mL/hr PRN        Objective:    BP (!) 164/92   Pulse 89   Temp 98.6 °F (37 °C)   Resp 18   Ht 5' 3\" (1.6 m)   Wt 191 lb 9.3 oz (86.9 kg)   SpO2 97%   BMI 33.94 kg/m²   General Appearance: alert and oriented to person, place and time and in no acute distress.  Speaks very limited English  Skin: warm and dry  Head: normocephalic and atraumatic  Neck: neck supple and non tender without mass   Pulmonary/Chest: Diminished bilaterally-   Cardiovascular: normal rate, normal S1 and S2 and no carotid bruits  Abdomen: soft, non-tender, non-distended, normal bowel sounds, no masses or organomegaly  Extremities: no cyanosis, no clubbing +2-edema to bilateral lower extremities (improving)  Neurologic: speech normal          Recent Labs      02/25/18   0855  02/26/18   0630  02/27/18   0830   NA  136  138  138   K  4.8  4.8  4.7   CL  99  99  100   CO2  29  29  28   BUN  29*  40*  34*   CREATININE  3.3*  4.1*  3.8*   GLUCOSE  229*  337*  145*   CALCIUM  8.2* 8. 3*  8.3*       Recent Labs      02/25/18   0855  02/26/18   0630  02/27/18   0830   WBC  7.6  6.6  6.7   RBC  2.23*  2.51*  2.41*   HGB  7.1*  8.0*  7.6*   HCT  21.6*  24.4*  23.2*   MCV  96.9  97.2  96.3   MCH  31.8  31.9  31.5   MCHC  32.9  32.8  32.8   RDW  13.9  14.1  14.0   PLT  145  158  131   MPV  13.2*  13.0*  12.5*     Assessment:    Principal Problem:    Fluid overload  Active Problems:    Diabetes mellitus (HCC)    Essential hypertension    ESRD on dialysis ([facility] [address]    Anemia  Resolved Problems:    * No resolved hospital problems. *      Plan:  1.  Shortness of breath: due to Fluid overload from  missing dialysis. Continue dialysis. Nephrology managing.   VQ scan was low probability for PE, CT of the chest showed moderate left sided pleural effusion, no signs of pneumonia. 2.  ESRD:  started on dialysis 3 weeks ago when patient was in [facility]. He had never received dialysis in the [facility]. Nephrology following.  SW  arranging for outpatient dialysis. Had HD this morning. 3.  History of hypertension: blood pressure is above goal but improving, will continue dialysis, continue amlodipine. Monitor vitals. 4.  Anemia of chronic disease/end-stage renal disease: hemoglobin is 7.1 today. Aranesp started per renal and patient received his first dose on Thursday. Discussed with family and patient is agreeable with possible transfusion if needed. Monitor H & H   5.  History of diabetes: was on metformin and sliding scale insulin. currently the patient reports he is not taking any hypoglycemic agents. Continue on low-dose SSI, last A1c we have in our system was more than a year ago at 9.9, repeat A1c-7.3. 6.  Deconditioning: the patient declined with his functional status ever since he had his second toe of the right foot amputated back in October 2016. AM-PAC score 18/24  7. + aspergillus sputum culture: ordered in ED. Patient recently arrived back from [address] [place] [address] sob/ fluid overloaded. Consult ID for input.

## 2018-02-27 NOTE — PROGRESS NOTES
Occupational Therapy  Patient treatment attempted this AM.  Patient in dialysis, will continue OT POC as able.     Cora ALVARADO/KARI [id number]

## 2018-02-27 NOTE — CONSULTS
cattle. Family History:       Problem Relation Age of Onset    No Known Problems Mother     No Known Problems Father     No Known Problems Sister     No Known Problems Brother    . Otherwise non-pertinent to the chief complaint. REVIEW OF SYSTEMS:    Constitutional: Negative  for fevers, chills, diaphoresis  Neurologic: Negative   Psychiatric: Negative  Rheumatologic: Negative   Endocrine: Negative  Hematologic: Negative  Immunologic: Negative   ENT: Negative  Respiratory:As in the HPI. Improved   Cardiovascular: Negative  GI: Negative  :End-stage renal disease, now requiring hemodialysis   Musculoskeletal: Negative  Skin: No rashes. PHYSICAL EXAM:    Vitals:   BP (!) 164/92   Pulse 89   Temp 98.6 °F (37 °C)   Resp 18   Ht 5' 3\" (1.6 m)   Wt 191 lb 9.3 oz (86.9 kg)   SpO2 97%   BMI 33.94 kg/m²   Constitutional: The patient is awake, alert, and oriented. Sitting in bed. No distress. Family present. Skin: Warm and dry. No rashes were noted. HEENT: Eyes show round, and reactive pupils. No jaundice. Moist mucous membranes, no ulcerations, no thrush. Neck: Supple to movements. No lymphadenopathy. Chest: No use of accessory muscles to breathe. Symmetrical expansion. Auscultation reveals no wheezing, crackles, or rhonchi. Cardiovascular: S1 and S2 are rhythmic and regular. No murmurs appreciated. Abdomen: Positive bowel sounds to auscultation. Benign to palpation. No masses felt. Extremities: No clubbing, no cyanosis, no edema.   Lines: peripheral      CBC+dif:  Recent Labs      02/25/18   0855  02/26/18   0630  02/27/18   0830   WBC  7.6  6.6  6.7   HGB  7.1*  8.0*  7.6*   HCT  21.6*  24.4*  23.2*   MCV  96.9  97.2  96.3   PLT  145  158  131     Lab Results   Component Value Date    CRP 2.6 (H) 02/21/2018    CRP 3.8 (H) 10/19/2016    CRP 7.4 (H) 02/21/2016      No results found for: [facility]  Lab Results   Component Value Date    SEDRATE 40 (H) 02/21/2018    SEDRATE 68 (H) 10/19/2016 SEDRATE 45 (H) 02/21/2016     Lab Results   Component Value Date    ALT 32 02/24/2018    AST 20 02/24/2018    ALKPHOS 77 02/24/2018    BILITOT 0.5 02/24/2018     Lab Results   Component Value Date     02/27/2018    K 4.7 02/27/2018     02/27/2018    CO2 28 02/27/2018    BUN 34 02/27/2018    CREATININE 3.8 02/27/2018    GFRAA 19 02/27/2018    LABGLOM 16 02/27/2018    GLUCOSE 145 02/27/2018    PROT 5.0 02/24/2018    LABALBU 2.4 02/24/2018    CALCIUM 8.3 02/27/2018    BILITOT 0.5 02/24/2018    ALKPHOS 77 02/24/2018    AST 20 02/24/2018    ALT 32 02/24/2018       Lab Results   Component Value Date    PROTIME 12.3 02/21/2018    INR 1.1 02/21/2018       Lab Results   Component Value Date    TSH 6.250 11/15/2016       Lab Results   Component Value Date    COLORU Yellow 02/21/2018    PHUR 6.0 02/21/2018    LABCAST FEW 02/21/2018    WBCUA 2-5 02/21/2018    RBCUA 0-1 02/21/2018    BACTERIA FEW 02/21/2018    CLARITYU Clear 02/21/2018    SPECGRAV 1.020 02/21/2018    LEUKOCYTESUR Negative 02/21/2018    UROBILINOGEN 0.2 02/21/2018    BILIRUBINUR Negative 02/21/2018    BLOODU MODERATE 02/21/2018    GLUCOSEU 500 02/21/2018    AMORPHOUS MANY 02/19/2016     Radiology:  CT scan of the chest reviewed. Left pleural effusion. No infiltrates compatible with invasive aspergillosis    Microbiology:  Respiratory culture light growth Aspergillus    Assessment:  · End-stage renal disease, requiring hemodialysis  · Left pleural effusion associated to volume overload. No evidence of pneumonia  · Sputum colonization with Aspergillus. No evidence of invasive aspergillosis at this point in time  · History of positive IgG titers for Q fever. IgM titers were negative. One of the titers doubled from 1:512 to 1:1024, which is only 1 dilution in difference    Plan:    · No need to repeat cultures or treat Aspergillus colonization   · Repeat Q fever titers.  This may become an issue if the patient would become a kidney transplant recipient

## 2018-02-27 NOTE — FLOWSHEET NOTE
02/27/18 1211   Vital Signs   BP (!) 164/92   Temp 98.6 °F (37 °C)   Pulse 89   Weight 191 lb 9.3 oz (86.9 kg)   Percent Weight Change -1.25   Pain Assessment   Pain Level 0   Post-Hemodialysis Assessment   Post-Treatment Procedures Blood returned;Catheter capped, clamped and heparinized x 2 ports   Machine Disinfection Process Acid/Vinegar Clean;Heat Disinfect; Exterior Machine Disinfection   Rinseback Volume (ml) 300 ml   Total Liters Processed (l/min) 80.7 l/min   Dialyzer Clearance Lightly streaked   Duration of Treatment (minutes) 240 minutes   Hemodialysis Intake (ml) 300 ml   Hemodialysis Output (ml) 2300 ml   NET Removed (ml) 2000 ml   Tolerated Treatment Good   Patient Response to Treatment david well   Bilateral Breath Sounds Diminished   Edema Right lower extremity; Left lower extremity   david well, report called to floor

## 2018-02-27 NOTE — PROGRESS NOTES
Nephrology Progress Note  [facility]    Reason for Consult:  ESRD  Requesting Physician:  Dr Juan Dahl  Date of Service: 2/27/2018     Subjective    Patient seen and examined  On dialysis and tolerating well  ROS limited by language but denies pain, sob.   Denies nausea currently          Past Medical History:        Diagnosis Date    CAP (community acquired pneumonia) 2/22/2016    [facility] ([facility]     Dialysis patient ([facility]     Hypertension     Pneumonia        Past Surgical History:        Procedure Laterality Date    OTHER SURGICAL HISTORY Right 10/24/2016    right foot I&D with bone debridement and biopsy partial second digit amputation       Current Medications:    Current Facility-Administered Medications: calcium-vitamin D (OSCAL-500) 500-200 MG-UNIT per tablet 1 tablet, 1 tablet, Oral, BID  bumetanide (BUMEX) tablet 2 mg, 2 mg, Oral, BID  darbepoetin jesús-polysorbate (ARANESP) injection 60 mcg, 60 mcg, Subcutaneous, Weekly  amLODIPine (NORVASC) tablet 5 mg, 5 mg, Oral, QAM  albuterol-ipratropium (COMBIVENT RESPIMAT)  MCG/ACT inhaler 1 puff, 1 puff, Inhalation, BID  sodium chloride flush 0.9 % injection 10 mL, 10 mL, Intravenous, 2 times per day  sodium chloride flush 0.9 % injection 10 mL, 10 mL, Intravenous, PRN  acetaminophen (TYLENOL) tablet 650 mg, 650 mg, Oral, Q4H PRN  magnesium hydroxide (MILK OF MAGNESIA) 400 MG/5ML suspension 30 mL, 30 mL, Oral, Daily PRN  ondansetron (ZOFRAN) injection 4 mg, 4 mg, Intravenous, Q6H PRN  heparin (porcine) injection 5,000 Units, 5,000 Units, Subcutaneous, 3 times per day  insulin lispro (HUMALOG) injection vial 0-6 Units, 0-6 Units, Subcutaneous, TID WC  insulin lispro (HUMALOG) injection vial 0-3 Units, 0-3 Units, Subcutaneous, Nightly  glucose (GLUTOSE) 40 % oral gel 15 g, 15 g, Oral, PRN  dextrose 50 % solution 12.5 g, 12.5 g, Intravenous, PRN  glucagon (rDNA) injection 1 mg, 1 mg, Intramuscular, PRN  dextrose 5 % solution, 100 mL/hr, Intravenous, PRN    Allergies:  Patient has no known allergies. Physical exam:   Constitutional:  VITALS:  BP (!) 153/94   Pulse 80   Temp 99.8 °F (37.7 °C)   Resp 18   Ht 5' 3\" (1.6 m)   Wt 194 lb 0.1 oz (88 kg)   SpO2 97%   BMI 34.37 kg/m²     Gen: alert, awake, no acute distress  Eyes: anicteric sclerae, eomi  HEENT: atraumatic/normocephalic, moist mucus membranes  Lungs: clear to ascultation bilaterally, equal lung expansion  CV: RRR, no murmurs, rub, gallop  Abdomen: soft, nontender, nondistended, normoactive bowel sounds  Extremitiy: no clubbing, cyanosis  1+ edema  : no CVA tenderness  Skin: no rash, tugor wnl  Neuro: no focal deficits  Psych: cooperative, limited by language barrier        Data:         Last 3 BMP  Recent Labs      02/25/18   0855  02/26/18   0630   NA  136  138   K  4.8  4.8   CL  99  99   CO2  29  29   BUN  29*  40*   CREATININE  3.3*  4.1*   GLUCOSE  229*  337*   CALCIUM  8.2*  8.3*         Last 3 CMP:    Recent Labs      02/25/18   0855  02/26/18   0630   NA  136  138   K  4.8  4.8   CL  99  99   CO2  29  29   BUN  29*  40*   CREATININE  3.3*  4.1*   GLUCOSE  229*  337*   CALCIUM  8.2*  8.3*         Last 3 Glucose:     Recent Labs      02/25/18   0855  02/26/18   0630   GLUCOSE  229*  337*         Last 3 POC Glucose:     No results for input(s): POCGLU in the last 72 hours. Last 3 CK, CKMB, Troponin  No results for input(s): CKTOTAL, CKMB, TROPONINI in the last 72 hours. Last 3 CBC:  Recent Labs      02/25/18   0855  02/26/18   0630   WBC  7.6  6.6   RBC  2.23*  2.51*   HGB  7.1*  8.0*   HCT  21.6*  24.4*   MCV  96.9  97.2   MCH  31.8  31.9   MCHC  32.9  32.8   RDW  13.9  14.1   PLT  145  158   MPV  13.2*  13.0*       Last 3 Hepatic Function Panel:    No results for input(s): ALKPHOS, ALT, AST, PROT, BILITOT, BILIDIR, LABALBU in the last 72 hours. Albumin:  No results for input(s): LABALBU in the last 72 hours.     Calcium:  Recent Labs      02/26/18   0630 CALCIUM  8.3*       Ionized Calcium:  No results for input(s): IONCA in the last 72 hours. Magnesium:    Recent Labs      02/26/18   0630   MG  1.9         ABGs:  No results for input(s): PHART, PO2ART, RCY4CFR, FEV2AVJ, BEART, S2FEDZVW in the last 72 hours. Lactic Acid:  No results for input(s): LACTA in the last 72 hours. Last 3 Amylase:  No results for input(s): AMYLASE in the last 72 hours. Last 3 Lipase:  No results for input(s): LIPASE in the last 72 hours. Last 3 BNP:  No results for input(s): BNP in the last 72 hours.                   Assessment/Plan      1 ESRD  Per report, started dialysis about one month ago while in [facility] in place  Will follow daily for dialysis needs  On dialysis currently for clearance and volume removal - tolerating well  Outpatient dialysis being arranged    2 Volume overload  UF as tolerated with HD    3 Hypertension with CKD V  BP under acceptable control  Follow and adjust regimen as needed    4 Hypophosphatemia  In setting of dialysis removal  Follow and treat as needed    5 Anemia  Hgb stable  No reported blood loss  Continue ARNOLDO  Transfuse as needed      Note: no labs from today available at the time of this note  Thank you for the opportunity to participate in the care of  Mr Portillo Record     ______________________________      Demario Grace MD  2/27/2018  8:24 AM

## 2018-02-28 LAB
ANION GAP SERPL CALCULATED.3IONS-SCNC: 10 MMOL/L (ref 7–16)
BUN BLDV-MCNC: 25 MG/DL (ref 8–23)
CALCIUM SERPL-MCNC: 8 MG/DL (ref 8.6–10.2)
CHLORIDE BLD-SCNC: 100 MMOL/L (ref 98–107)
CO2: 25 MMOL/L (ref 22–29)
CREAT SERPL-MCNC: 3.1 MG/DL (ref 0.7–1.2)
GFR AFRICAN AMERICAN: 24
GFR NON-AFRICAN AMERICAN: 20 ML/MIN/1.73
GLUCOSE BLD-MCNC: 276 MG/DL (ref 74–109)
HCT VFR BLD CALC: 21.9 % (ref 37–54)
HEMOGLOBIN: 7.3 G/DL (ref 12.5–16.5)
MCH RBC QN AUTO: 32.7 PG (ref 26–35)
MCHC RBC AUTO-ENTMCNC: 33.3 % (ref 32–34.5)
MCV RBC AUTO: 98.2 FL (ref 80–99.9)
METER GLUCOSE: 183 MG/DL (ref 70–110)
METER GLUCOSE: 233 MG/DL (ref 70–110)
METER GLUCOSE: 266 MG/DL (ref 70–110)
METER GLUCOSE: 319 MG/DL (ref 70–110)
PDW BLD-RTO: 14.2 FL (ref 11.5–15)
PHOSPHORUS: 1.8 MG/DL (ref 2.5–4.5)
PLATELET # BLD: 148 E9/L (ref 130–450)
PMV BLD AUTO: 13 FL (ref 7–12)
POTASSIUM SERPL-SCNC: 5.1 MMOL/L (ref 3.5–5)
RBC # BLD: 2.23 E12/L (ref 3.8–5.8)
SODIUM BLD-SCNC: 135 MMOL/L (ref 132–146)
WBC # BLD: 6.1 E9/L (ref 4.5–11.5)

## 2018-02-28 PROCEDURE — 94640 AIRWAY INHALATION TREATMENT: CPT

## 2018-02-28 PROCEDURE — 97530 THERAPEUTIC ACTIVITIES: CPT

## 2018-02-28 PROCEDURE — 85027 COMPLETE CBC AUTOMATED: CPT

## 2018-02-28 PROCEDURE — 82962 GLUCOSE BLOOD TEST: CPT

## 2018-02-28 PROCEDURE — 36415 COLL VENOUS BLD VENIPUNCTURE: CPT

## 2018-02-28 PROCEDURE — 84100 ASSAY OF PHOSPHORUS: CPT

## 2018-02-28 PROCEDURE — 9990 CHARGE CONVERSION

## 2018-02-28 PROCEDURE — 80048 BASIC METABOLIC PNL TOTAL CA: CPT

## 2018-02-28 RX ADMIN — INSULIN LISPRO 1 UNITS: 100 INJECTION, SOLUTION INTRAVENOUS; SUBCUTANEOUS at 16:56

## 2018-02-28 RX ADMIN — INSULIN LISPRO 4 UNITS: 100 INJECTION, SOLUTION INTRAVENOUS; SUBCUTANEOUS at 11:53

## 2018-02-28 RX ADMIN — Medication 10 ML: at 21:09

## 2018-02-28 RX ADMIN — BUMETANIDE 2 MG: 1 TABLET ORAL at 21:10

## 2018-02-28 RX ADMIN — OYSTER SHELL CALCIUM WITH VITAMIN D 1 TABLET: 500; 200 TABLET, FILM COATED ORAL at 21:10

## 2018-02-28 RX ADMIN — HEPARIN SODIUM 5000 UNITS: 10000 INJECTION, SOLUTION INTRAVENOUS; SUBCUTANEOUS at 06:36

## 2018-02-28 RX ADMIN — INSULIN LISPRO 1 UNITS: 100 INJECTION, SOLUTION INTRAVENOUS; SUBCUTANEOUS at 21:08

## 2018-02-28 RX ADMIN — HEPARIN SODIUM 5000 UNITS: 10000 INJECTION, SOLUTION INTRAVENOUS; SUBCUTANEOUS at 21:09

## 2018-02-28 RX ADMIN — ACETAMINOPHEN 650 MG: 325 TABLET ORAL at 08:20

## 2018-02-28 RX ADMIN — AMLODIPINE BESYLATE 5 MG: 5 TABLET ORAL at 08:20

## 2018-02-28 RX ADMIN — INSULIN LISPRO 3 UNITS: 100 INJECTION, SOLUTION INTRAVENOUS; SUBCUTANEOUS at 08:20

## 2018-02-28 RX ADMIN — HEPARIN SODIUM 5000 UNITS: 10000 INJECTION, SOLUTION INTRAVENOUS; SUBCUTANEOUS at 15:32

## 2018-02-28 RX ADMIN — BUMETANIDE 2 MG: 1 TABLET ORAL at 08:20

## 2018-02-28 RX ADMIN — OYSTER SHELL CALCIUM WITH VITAMIN D 1 TABLET: 500; 200 TABLET, FILM COATED ORAL at 08:20

## 2018-02-28 RX ADMIN — Medication 10 ML: at 08:20

## 2018-02-28 ASSESSMENT — PAIN SCALES - GENERAL: PAINLEVEL_OUTOF10: 0

## 2018-02-28 NOTE — PROGRESS NOTES
Type and Reason for Visit: Initial, RD Nutrition Re-Screen (LOS Assessment - RD Re-Screen Negative)    Nutrition Screen:   · Have you recently lost weight without trying?  0 to 1 pound (0 points)   · Have you been eating poorly because of a decreased appetite?  No (0 points)   · Malnutrition Screening Tool Score  0    Dietitian Assessment of Nutrition Re-Screen: Pt assessed per LOS protocol. Pt currently eating % of meals, no un-planned wt loss (water wt loss noted), no non-healing wounds, no difficulty chew/swallowing (Upper dentures noted), no home EN/PN use and no other nutritional issues at this time. Will follow per policy. Please consult if needed.         Electronically signed by Dom Jones RD, LD on 2/28/18 at 9:21 AM    Contact Number: [phone]

## 2018-02-28 NOTE — PROGRESS NOTES
Nephrology Progress Note  [facility]    Reason for Consult:  ESRD  Requesting Physician:  Dr Gustavo Garcia  Date of Service: 2/28/2018     Subjective    Patient seen and examined  Resting   ROS limited by language but family does translate.   No chest pain, sob, abd pain, n/v            Past Medical History:        Diagnosis Date    CAP (community acquired pneumonia) 2/22/2016    [facility]     Dialysis patient [facility]     Hypertension     Pneumonia        Past Surgical History:        Procedure Laterality Date    OTHER SURGICAL HISTORY Right 10/24/2016    right foot I&D with bone debridement and biopsy partial second digit amputation       Current Medications:    Current Facility-Administered Medications: calcium-vitamin D (OSCAL-500) 500-200 MG-UNIT per tablet 1 tablet, 1 tablet, Oral, BID  bumetanide (BUMEX) tablet 2 mg, 2 mg, Oral, BID  darbepoetin jesús-polysorbate (ARANESP) injection 60 mcg, 60 mcg, Subcutaneous, Weekly  amLODIPine (NORVASC) tablet 5 mg, 5 mg, Oral, QAM  albuterol-ipratropium (COMBIVENT RESPIMAT)  MCG/ACT inhaler 1 puff, 1 puff, Inhalation, BID  sodium chloride flush 0.9 % injection 10 mL, 10 mL, Intravenous, 2 times per day  sodium chloride flush 0.9 % injection 10 mL, 10 mL, Intravenous, PRN  acetaminophen (TYLENOL) tablet 650 mg, 650 mg, Oral, Q4H PRN  magnesium hydroxide (MILK OF MAGNESIA) 400 MG/5ML suspension 30 mL, 30 mL, Oral, Daily PRN  ondansetron (ZOFRAN) injection 4 mg, 4 mg, Intravenous, Q6H PRN  heparin (porcine) injection 5,000 Units, 5,000 Units, Subcutaneous, 3 times per day  insulin lispro (HUMALOG) injection vial 0-6 Units, 0-6 Units, Subcutaneous, TID WC  insulin lispro (HUMALOG) injection vial 0-3 Units, 0-3 Units, Subcutaneous, Nightly  glucose (GLUTOSE) 40 % oral gel 15 g, 15 g, Oral, PRN  dextrose 50 % solution 12.5 g, 12.5 g, Intravenous, PRN  glucagon (rDNA) injection 1 mg, 1 mg, Intramuscular, PRN  dextrose 5 % solution, 100 mL/hr, Panel:    No results for input(s): ALKPHOS, ALT, AST, PROT, BILITOT, BILIDIR, LABALBU in the last 72 hours. Albumin:  No results for input(s): LABALBU in the last 72 hours. Calcium:  Recent Labs      02/28/18   0533   CALCIUM  8.0*       Ionized Calcium:  No results for input(s): IONCA in the last 72 hours. Magnesium:    Recent Labs      02/26/18   0630   MG  1.9         ABGs:  No results for input(s): PHART, PO2ART, ZMZ5IUE, CGD8CQN, BEART, Q5GQRXMB in the last 72 hours. Lactic Acid:  No results for input(s): LACTA in the last 72 hours. Last 3 Amylase:  No results for input(s): AMYLASE in the last 72 hours. Last 3 Lipase:  No results for input(s): LIPASE in the last 72 hours. Last 3 BNP:  No results for input(s): BNP in the last 72 hours.                   Assessment/Plan      1 ESRD  Per report, started dialysis about one month ago while in [facility] in place  Will follow daily for dialysis needs    Plan dialysis tomorrow for clearance and volume removal   Outpatient dialysis being arranged    2 Volume overload  UF as tolerated with HD    3 Hypertension with CKD V  BP under acceptable control  Follow and adjust regimen as needed    4 Hypophosphatemia  In setting of dialysis removal and possible poor intake   Follow and treat as needed  Will replace phos today    5 Anemia  Hgb lower   No reported blood loss  Continue ARNOLDO  Transfuse as needed  Did get consent from patient via family for transfusion    Thank you for the opportunity to participate in the care of  Mr Mena Cárdenas     ______________________________      Makayla Loomis MD  2/28/2018  12:53 PM

## 2018-02-28 NOTE — PROGRESS NOTES
difference    PLAN:  · No need to repeat cultures or treat Aspergillus colonization Of airways  · Follow up repeat Q fever titers. This may become an issue if the patient would become a kidney transplant recipient. Otherwise, he did not see past infection    AKI Montesinos  10:16 AM  2/28/2018     Patient seen and examined. I had a face to face encounter with the patient. Agree with exam, assessment and plan as outlined above. Addition and corrections were done as deemed appropriate. My exam and plan include: Currently asymptomatic. We will follow with you.     Navjot Cates  2/28/2018

## 2018-03-01 LAB
ABO/RH: NORMAL
ANTIBODY SCREEN: NORMAL
BLOOD BANK DISPENSE STATUS: NORMAL
BLOOD BANK DISPENSE STATUS: NORMAL
BLOOD BANK PRODUCT CODE: NORMAL
BLOOD BANK PRODUCT CODE: NORMAL
BPU ID: NORMAL
BPU ID: NORMAL
DESCRIPTION BLOOD BANK: NORMAL
DESCRIPTION BLOOD BANK: NORMAL
HCT VFR BLD CALC: 22.5 % (ref 37–54)
HEMOGLOBIN: 7.3 G/DL (ref 12.5–16.5)
MCH RBC QN AUTO: 31.7 PG (ref 26–35)
MCHC RBC AUTO-ENTMCNC: 32.4 % (ref 32–34.5)
MCV RBC AUTO: 97.8 FL (ref 80–99.9)
METER GLUCOSE: 123 MG/DL (ref 70–110)
METER GLUCOSE: 184 MG/DL (ref 70–110)
METER GLUCOSE: 184 MG/DL (ref 70–110)
METER GLUCOSE: 240 MG/DL (ref 70–110)
PDW BLD-RTO: 14.2 FL (ref 11.5–15)
PLATELET # BLD: 164 E9/L (ref 130–450)
PMV BLD AUTO: 11.9 FL (ref 7–12)
RBC # BLD: 2.3 E12/L (ref 3.8–5.8)
WBC # BLD: 6.3 E9/L (ref 4.5–11.5)

## 2018-03-01 PROCEDURE — 86850 RBC ANTIBODY SCREEN: CPT

## 2018-03-01 PROCEDURE — G0257 UNSCHED DIALYSIS ESRD PT HOS: HCPCS

## 2018-03-01 PROCEDURE — 86923 COMPATIBILITY TEST ELECTRIC: CPT

## 2018-03-01 PROCEDURE — 36430 TRANSFUSION BLD/BLD COMPNT: CPT

## 2018-03-01 PROCEDURE — 9990 CHARGE CONVERSION

## 2018-03-01 PROCEDURE — 86901 BLOOD TYPING SEROLOGIC RH(D): CPT

## 2018-03-01 PROCEDURE — P9016 RBC LEUKOCYTES REDUCED: HCPCS

## 2018-03-01 PROCEDURE — 85027 COMPLETE CBC AUTOMATED: CPT

## 2018-03-01 PROCEDURE — 86900 BLOOD TYPING SEROLOGIC ABO: CPT

## 2018-03-01 PROCEDURE — 94640 AIRWAY INHALATION TREATMENT: CPT

## 2018-03-01 PROCEDURE — 82962 GLUCOSE BLOOD TEST: CPT

## 2018-03-01 PROCEDURE — 36415 COLL VENOUS BLD VENIPUNCTURE: CPT

## 2018-03-01 RX ORDER — 0.9 % SODIUM CHLORIDE 0.9 %
250 INTRAVENOUS SOLUTION INTRAVENOUS ONCE
Status: COMPLETED | OUTPATIENT
Start: 2018-03-01 | End: 2018-03-02

## 2018-03-01 RX ADMIN — INSULIN LISPRO 1 UNITS: 100 INJECTION, SOLUTION INTRAVENOUS; SUBCUTANEOUS at 22:06

## 2018-03-01 RX ADMIN — DARBEPOETIN ALFA 60 MCG: 60 INJECTION, SOLUTION INTRAVENOUS; SUBCUTANEOUS at 12:31

## 2018-03-01 RX ADMIN — INSULIN LISPRO 1 UNITS: 100 INJECTION, SOLUTION INTRAVENOUS; SUBCUTANEOUS at 12:31

## 2018-03-01 RX ADMIN — HEPARIN SODIUM 5000 UNITS: 10000 INJECTION, SOLUTION INTRAVENOUS; SUBCUTANEOUS at 06:45

## 2018-03-01 RX ADMIN — Medication 250 ML: at 19:30

## 2018-03-01 RX ADMIN — HEPARIN SODIUM 5000 UNITS: 10000 INJECTION, SOLUTION INTRAVENOUS; SUBCUTANEOUS at 22:06

## 2018-03-01 RX ADMIN — HEPARIN SODIUM 5000 UNITS: 10000 INJECTION, SOLUTION INTRAVENOUS; SUBCUTANEOUS at 14:16

## 2018-03-01 RX ADMIN — Medication 10 ML: at 22:06

## 2018-03-01 RX ADMIN — BUMETANIDE 2 MG: 1 TABLET ORAL at 22:06

## 2018-03-01 RX ADMIN — INSULIN LISPRO 1 UNITS: 100 INJECTION, SOLUTION INTRAVENOUS; SUBCUTANEOUS at 16:49

## 2018-03-01 ASSESSMENT — PAIN SCALES - GENERAL
PAINLEVEL_OUTOF10: 0

## 2018-03-01 NOTE — PATIENT CARE CONFERENCE
P Quality Flow/Interdisciplinary Rounds Progress Note        Quality Flow Rounds held on March 1, 2018    Disciplines Attending:  Bedside Nurse, ,  and Nursing Unit Leadership    Ernie Degroot was admitted on 2/21/2018  6:19 PM    Anticipated Discharge Date:  Expected Discharge Date: 02/24/18    Disposition:    Walker Score:  Walker Scale Score: 21    Readmission Risk              Readmission Risk:        20.75       Age 72 or Greater:  1    Admitted from SNF or Requires Paid or Family Care:  0    Currently has CHF,COPD,ARF,CRI,or is on dialysis:  0    Takes more than 5 Prescription Medications:  4    Takes Digoxin,Insulin,Anticoagulants,Narcotics or ASA/Plavix:  [facility] in Past 12 Months:  10    On Disability:  0    Patient Considers own Health:  3.75          Discussed patient goal for the day, patient clinical progression, and barriers to discharge.   The following Goal(s) of the Day/Commitment(s) have been identified:  Hemodialysis today        Yenifer Morales  March 1, 2018

## 2018-03-01 NOTE — CARE COORDINATION
Social Work:    Called [facility] in [facility] and Medicaid number is still not in as of yet.     Electronically signed by CLEMENTE Gilliland on 3/1/2018 at 10:15 AM

## 2018-03-01 NOTE — PLAN OF CARE
Problem: Falls - Risk of  Goal: Absence of falls  Outcome: Ongoing      Problem: Tissue Perfusion - Renal, Altered:  Goal: Electrolytes within specified parameters  Electrolytes within specified parameters   Outcome: Ongoing    Goal: Serum creatinine will be within specified parameters  Serum creatinine will be within specified parameters   Outcome: Ongoing

## 2018-03-01 NOTE — PROGRESS NOTES
Physical Therapy  Facility/Department: [facility] MED SURG    NAME: Cindy Ortega  : 1950  MRN: [id number]     Chart reviewed and PT treatment attempted this am.  Pt out of room at dialysis. Will check back at later time/date.      Ibrahima Barone, [phone]

## 2018-03-01 NOTE — PROGRESS NOTES
PRN  dextrose 5 % solution, 100 mL/hr, Intravenous, PRN    Allergies:  Patient has no known allergies. Physical exam:   Constitutional:  VITALS:  BP (!) 152/92   Pulse 88   Temp 99.5 °F (37.5 °C)   Resp 16   Ht 5' 3\" (1.6 m)   Wt 197 lb 12 oz (89.7 kg)   SpO2 95%   BMI 35.03 kg/m²     Gen: alert, awake, no acute distress  Eyes: eomi  HEENT: atraumatic/normocephalic, moist mucus membranes  Lungs: clear to ascultation bilaterally, equal lung expansion  CV: RRR, no murmurs, rub, gallop  Abdomen: soft, nontender, nondistended, normoactive bowel sounds  Extremitiy: no clubbing, cyanosis  1+ edema  : no CVA tenderness  Skin: no rash, tugor wnl  Neuro: no focal deficits  Psych: cooperative, limited by language barrier        Data:         Last 3 BMP  Recent Labs      02/27/18   0830  02/28/18   0533   NA  138  135   K  4.7  5.1*   CL  100  100   CO2  28  25   BUN  34*  25*   CREATININE  3.8*  3.1*   GLUCOSE  145*  276*   CALCIUM  8.3*  8.0*         Last 3 CMP:    Recent Labs      02/27/18   0830  02/28/18   0533   NA  138  135   K  4.7  5.1*   CL  100  100   CO2  28  25   BUN  34*  25*   CREATININE  3.8*  3.1*   GLUCOSE  145*  276*   CALCIUM  8.3*  8.0*         Last 3 Glucose:     Recent Labs      02/27/18   0830  02/28/18   0533   GLUCOSE  145*  276*         Last 3 POC Glucose:     No results for input(s): POCGLU in the last 72 hours. Last 3 CK, CKMB, Troponin  No results for input(s): CKTOTAL, CKMB, TROPONINI in the last 72 hours. Last 3 CBC:  Recent Labs      02/27/18   0830  02/28/18   0533  03/01/18   0945   WBC  6.7  6.1  6.3   RBC  2.41*  2.23*  2.30*   HGB  7.6*  7.3*  7.3*   HCT  23.2*  21.9*  22.5*   MCV  96.3  98.2  97.8   MCH  31.5  32.7  31.7   MCHC  32.8  33.3  32.4   RDW  14.0  14.2  14.2   PLT  131  148  164   MPV  12.5*  13.0*  11.9       Last 3 Hepatic Function Panel:    No results for input(s): ALKPHOS, ALT, AST, PROT, BILITOT, BILIDIR, LABALBU in the last 72 hours.     Albumin:  No results for input(s): LABALBU in the last 72 hours. Calcium:  Recent Labs      02/28/18   0533   CALCIUM  8.0*       Ionized Calcium:  No results for input(s): IONCA in the last 72 hours. Magnesium:    No results for input(s): MG in the last 72 hours. ABGs:  No results for input(s): PHART, PO2ART, EEO2YBS, SSL3LSW, BEART, E3AKGZKR in the last 72 hours. Lactic Acid:  No results for input(s): LACTA in the last 72 hours. Last 3 Amylase:  No results for input(s): AMYLASE in the last 72 hours. Last 3 Lipase:  No results for input(s): LIPASE in the last 72 hours. Last 3 BNP:  No results for input(s): BNP in the last 72 hours.                   Assessment/Plan      1 ESRD  Per report, started dialysis about one month ago while in [facility] in place  Will follow daily for dialysis needs    On dialysis currently and tolerating well  On dialysis clearance and volume removal   Outpatient dialysis being arranged    2 Volume overload  UF as tolerated with HD    3 Hypertension with CKD V  BP under acceptable control - on dialysis   Follow and adjust regimen as needed    4 Hypophosphatemia  In setting of dialysis removal and possible poor intake   Follow and treat as needed  Replacement ordered on 2/28    5 Anemia  Hgb lower   No reported blood loss  Continue ARNOLDO  Did get consent from patient via family for transfusion on 2-28  Will transfuse 1 unit of PRBCs today     Thank you for the opportunity to participate in the care of  Mr Rich Ortez     ______________________________      Khadijah Cody MD  3/1/2018  10:41 AM

## 2018-03-01 NOTE — PROGRESS NOTES
[address] [place] [address] [facility]  [facility]  Progress Note    SUBJECTIVE:  Chief Complaint   Patient presents with    Fatigue    Shortness of Breath     dialysis patient in [facility], has not had dialysis for four days    Cough     Patient is tolerating medications. No reported adverse drug reactions. No nausea, vomiting, diarrhea. Patient's family translating for patient, states that the patient had some chills and sweats last night. No fevers, temp this AM 99.7. Fatigue after dialysis. Otherwise, no new symptoms or complaints. Medications:  Scheduled Meds:   sodium chloride  250 mL Intravenous Once    bumetanide  2 mg Oral BID    darbepoetin jesús-polysorbate  60 mcg Subcutaneous Weekly    amLODIPine  5 mg Oral QAM    albuterol-ipratropium  1 puff Inhalation BID    sodium chloride flush  10 mL Intravenous 2 times per day    heparin (porcine)  5,000 Units Subcutaneous 3 times per day    insulin lispro  0-6 Units Subcutaneous TID WC    insulin lispro  0-3 Units Subcutaneous Nightly     Continuous Infusions:   dextrose       PRN Meds:sodium chloride flush, acetaminophen, magnesium hydroxide, ondansetron, glucose, dextrose, glucagon (rDNA), dextrose    OBJECTIVE:  /69   Pulse 89   Temp 98.8 °F (37.1 °C) (Oral)   Resp 16   Ht 5' 3\" (1.6 m)   Wt 192 lb 3.9 oz (87.2 kg)   SpO2 94%   BMI 34.05 kg/m²   Temp  Av.2 °F (37.3 °C)  Min: 98.8 °F (37.1 °C)  Max: 99.7 °F (37.6 °C)  Constitutional: The patient is awake, alert, and oriented. No distress. Family at bedside. Skin: Warm and dry. No rashes were noted. HEENT: Eyes show round, and reactive pupils. No jaundice. Moist mucous membranes, no ulcerations, no thrush. Neck: Supple to movements. Chest: No use of accessory muscles to breathe. Symmetrical expansion. Auscultation reveals no wheezing, crackles, or rhonchi. Right chest HD catheter. Cardiovascular: S1 and S2 are rhythmic and regular. No murmurs appreciated.

## 2018-03-02 LAB
METER GLUCOSE: 111 MG/DL (ref 70–110)
METER GLUCOSE: 148 MG/DL (ref 70–110)
METER GLUCOSE: 169 MG/DL (ref 70–110)
METER GLUCOSE: 215 MG/DL (ref 70–110)

## 2018-03-02 PROCEDURE — 94640 AIRWAY INHALATION TREATMENT: CPT

## 2018-03-02 PROCEDURE — 82962 GLUCOSE BLOOD TEST: CPT

## 2018-03-02 PROCEDURE — 9990 CHARGE CONVERSION

## 2018-03-02 PROCEDURE — 97530 THERAPEUTIC ACTIVITIES: CPT

## 2018-03-02 RX ADMIN — AMLODIPINE BESYLATE 5 MG: 5 TABLET ORAL at 08:21

## 2018-03-02 RX ADMIN — INSULIN LISPRO 2 UNITS: 100 INJECTION, SOLUTION INTRAVENOUS; SUBCUTANEOUS at 11:51

## 2018-03-02 RX ADMIN — BUMETANIDE 2 MG: 1 TABLET ORAL at 21:31

## 2018-03-02 RX ADMIN — Medication 10 ML: at 08:21

## 2018-03-02 RX ADMIN — HEPARIN SODIUM 5000 UNITS: 10000 INJECTION, SOLUTION INTRAVENOUS; SUBCUTANEOUS at 13:37

## 2018-03-02 RX ADMIN — Medication 10 ML: at 21:31

## 2018-03-02 RX ADMIN — INSULIN LISPRO 1 UNITS: 100 INJECTION, SOLUTION INTRAVENOUS; SUBCUTANEOUS at 16:49

## 2018-03-02 RX ADMIN — HEPARIN SODIUM 5000 UNITS: 10000 INJECTION, SOLUTION INTRAVENOUS; SUBCUTANEOUS at 06:51

## 2018-03-02 RX ADMIN — BUMETANIDE 2 MG: 1 TABLET ORAL at 08:21

## 2018-03-02 RX ADMIN — INSULIN LISPRO 1 UNITS: 100 INJECTION, SOLUTION INTRAVENOUS; SUBCUTANEOUS at 21:31

## 2018-03-02 RX ADMIN — HEPARIN SODIUM 5000 UNITS: 10000 INJECTION, SOLUTION INTRAVENOUS; SUBCUTANEOUS at 23:23

## 2018-03-02 ASSESSMENT — PAIN SCALES - GENERAL
PAINLEVEL_OUTOF10: 0
PAINLEVEL_OUTOF10: 0

## 2018-03-02 NOTE — PROGRESS NOTES
Pulse 84   Temp 98.8 °F (37.1 °C) (Oral)   Resp 16   Ht 5' 3\" (1.6 m)   Wt 192 lb 6.4 oz (87.3 kg)   SpO2 96%   BMI 34.08 kg/m²     Gen: alert, awake, no acute distress  Eyes: eomi  HEENT: atraumatic/normocephalic, moist mucus membranes  Lungs: clear to ascultation bilaterally, equal lung expansion  CV: RRR, no murmurs, rub, gallop  Abdomen: soft, nontender, nondistended, normoactive bowel sounds  Extremitiy: no clubbing, cyanosis  1+ edema  : no CVA tenderness  Skin: no rash, tugor wnl  Neuro: no focal deficits  Psych: cooperative, limited by language barrier        Data:         Last 3 BMP  Recent Labs      02/28/18   0533   NA  135   K  5.1*   CL  100   CO2  25   BUN  25*   CREATININE  3.1*   GLUCOSE  276*   CALCIUM  8.0*         Last 3 CMP:    Recent Labs      02/28/18   0533   NA  135   K  5.1*   CL  100   CO2  25   BUN  25*   CREATININE  3.1*   GLUCOSE  276*   CALCIUM  8.0*         Last 3 Glucose:     Recent Labs      02/28/18   0533   GLUCOSE  276*         Last 3 POC Glucose:     No results for input(s): POCGLU in the last 72 hours. Last 3 CK, CKMB, Troponin  No results for input(s): CKTOTAL, CKMB, TROPONINI in the last 72 hours. Last 3 CBC:  Recent Labs      02/28/18   0533  03/01/18   0945   WBC  6.1  6.3   RBC  2.23*  2.30*   HGB  7.3*  7.3*   HCT  21.9*  22.5*   MCV  98.2  97.8   MCH  32.7  31.7   MCHC  33.3  32.4   RDW  14.2  14.2   PLT  148  164   MPV  13.0*  11.9       Last 3 Hepatic Function Panel:    No results for input(s): ALKPHOS, ALT, AST, PROT, BILITOT, BILIDIR, LABALBU in the last 72 hours. Albumin:  No results for input(s): LABALBU in the last 72 hours. Calcium:  Recent Labs      02/28/18   0533   CALCIUM  8.0*       Ionized Calcium:  No results for input(s): IONCA in the last 72 hours. Magnesium:    No results for input(s): MG in the last 72 hours. ABGs:  No results for input(s): PHART, PO2ART, YIL9ACY, WUQ7IKS, BEART, G9HELDDD in the last 72 hours.     Lactic Acid:  No results for input(s): LACTA in the last 72 hours. Last 3 Amylase:  No results for input(s): AMYLASE in the last 72 hours. Last 3 Lipase:  No results for input(s): LIPASE in the last 72 hours. Last 3 BNP:  No results for input(s): BNP in the last 72 hours.                   Assessment/Plan      1 ESRD  Per report, started dialysis about one month ago while in [facility] in place  Will follow daily for dialysis needs    Place dialysis tomorrow for clearance and volume removal   Outpatient dialysis being arranged    2 Volume overload  UF as tolerated with HD    3 Hypertension with CKD V  BP under acceptable control  Follow and adjust regimen as needed    4 Hypophosphatemia  In setting of dialysis removal and possible poor intake   Follow and treat as needed  Replacement ordered on 2/28    5 Anemia  Hgb lower   No reported blood loss  Continue ARNOLDO  Did get consent from patient via family for transfusion on 2-28 with transfusion on 3-1  Follow Hgb      Note: no labs from today at the time of this note    Thank you for the opportunity to participate in the care of  Mr Elder Bhat     ______________________________      Gillermo Dakin, MD  3/2/2018  2:39 PM

## 2018-03-02 NOTE — PROGRESS NOTES
Occupational Therapy  Patient treatment attempted this PM.  Patient declined participation, will continue OT POC as able.     Marky ALVARADO/KARI [id number]

## 2018-03-02 NOTE — PROGRESS NOTES
140  feet with  No AD  with  SBA   400 feet with no device supervision  200 feet with  No AD  with  Independent    Stair negotiation: ascended and descended [employer] with  1 rail with  CGA    NT  12  steps with 1  rail with  Supervision    LE ROM  WFL        LE strength  WFL        AM- PAC RAW score  18/ 24 22/24           Additional Comments: no loss of balance noted during ambulaiton    Pt was left in chair with call light left by patient. Pt is making good progress toward established Physical Therapy goals. Continue with physical therapy current plan of care.     Latosha AREVALO [phone]

## 2018-03-02 NOTE — PATIENT CARE CONFERENCE
[facility] Quality Flow/Interdisciplinary Rounds Progress Note        Quality Flow Rounds held on March 2, 2018    Disciplines Attending:  Bedside Nurse, ,  and Nursing Unit Leadership    Annalisa Pryor was admitted on 2/21/2018  6:19 PM    Anticipated Discharge Date:  Expected Discharge Date: 02/24/18    Disposition:    Walker Score:  Walker Scale Score: 19    Readmission Risk              Readmission Risk:        20.75       Age 72 or Greater:  1    Admitted from SNF or Requires Paid or Family Care:  0    Currently has CHF,COPD,ARF,CRI,or is on dialysis:  0    Takes more than 5 Prescription Medications:  4    Takes Digoxin,Insulin,Anticoagulants,Narcotics or ASA/Plavix:  [facility] in Past 12 Months:  10    On Disability:  0    Patient Considers own Health:  3.75          Discussed patient goal for the day, patient clinical progression, and barriers to discharge.   The following Goal(s) of the Day/Commitment(s) have been identified:  Diagnostics - Report Results      Anice Killer  March 2, 2018

## 2018-03-02 NOTE — PROGRESS NOTES
[facility] Hospitalist Progress Note    Admitting Date and Time: 2/21/2018  6:19 PM  Admit Dx: ESRD ON DIALYSIS    Subjective: The patient was observed resting in bed with eyes closed in NAD. He was alert to voice. He states no complaints at this time. Family members at bedside. No new issues per nursing.      ROS: Patient reports no fever, chills, cp, sob, n/v, headache      bumetanide  2 mg Oral BID    darbepoetin jesús-polysorbate  60 mcg Subcutaneous Weekly    amLODIPine  5 mg Oral QAM    albuterol-ipratropium  1 puff Inhalation BID    sodium chloride flush  10 mL Intravenous 2 times per day    heparin (porcine)  5,000 Units Subcutaneous 3 times per day    insulin lispro  0-6 Units Subcutaneous TID WC    insulin lispro  0-3 Units Subcutaneous Nightly       sodium chloride flush 10 mL PRN   acetaminophen 650 mg Q4H PRN   magnesium hydroxide 30 mL Daily PRN   ondansetron 4 mg Q6H PRN   glucose 15 g PRN   dextrose 12.5 g PRN   glucagon (rDNA) 1 mg PRN   dextrose 100 mL/hr PRN        Objective:    /73   Pulse 84   Temp 98.8 °F (37.1 °C) (Oral)   Resp 16   Ht 5' 3\" (1.6 m)   Wt 192 lb 6.4 oz (87.3 kg)   SpO2 96%   BMI 34.08 kg/m²    General: alert to voice and in NAD while resting in bed, pleasant   Head: normocephalic and atraumatic  Skin: warm and dry  Pulmonary/Chest: clear to auscultation bilaterally- no wheezes, rales or rhonchi, normal air movement, no respiratory distress, on room air   Cardiovascular: rhythm regular at rate of 84  Abdomen: soft, non-tender, non-distended, normal bowel sounds, no obvious masses  Extremities: no cyanosis, no clubbing and positive 1+ b/l le edema  Neurological: Speech clear     Recent Labs      02/28/18   0533   NA  135   K  5.1*   CL  100   CO2  25   BUN  25*   CREATININE  3.1*   GLUCOSE  276*   CALCIUM  8.0*       Recent Labs      02/28/18   0533  03/01/18   0945   WBC  6.1  6.3   RBC  2.23*  2.30*   HGB  7.3*  7.3*   HCT  21.9*  22.5*   MCV  98.2  97.8 MCH  32.7  31.7   MCHC  33.3  32.4   RDW  14.2  14.2   PLT  148  164   MPV  13.0*  11.9       CBC:   Lab Results   Component Value Date    WBC 6.3 03/01/2018    RBC 2.30 03/01/2018    HGB 7.3 03/01/2018    HCT 22.5 03/01/2018    MCV 97.8 03/01/2018    MCH 31.7 03/01/2018    MCHC 32.4 03/01/2018    RDW 14.2 03/01/2018     03/01/2018    MPV 11.9 03/01/2018     BMP:    Lab Results   Component Value Date     02/28/2018    K 5.1 02/28/2018     02/28/2018    CO2 25 02/28/2018    BUN 25 02/28/2018    LABALBU 2.4 02/24/2018    CREATININE 3.1 02/28/2018    CALCIUM 8.0 02/28/2018    GFRAA 24 02/28/2018    LABGLOM 20 02/28/2018    GLUCOSE 276 02/28/2018     Phosphorus:    Lab Results   Component Value Date    PHOS 1.8 02/28/2018       Ref. Range 3/1/2018 12:30   Meter Glucose Latest Ref Range: 70 - 110 mg/dL 184 (H)      Ref. Range 3/2/2018 06:50 3/2/2018 11:12   Meter Glucose Latest Ref Range: 70 - 110 mg/dL 111 (H) 215 (H)     Radiology:   US DVT LOWER BILATERAL COMPLETE   Final Result   Patent deep venous system of the Bilateral lower   extremity. No evidence for DVT. CT Chest WO Contrast   Final Result   1. Bilateral pleural effusions, left greater than right. 2. Small pericardial effusion. NM LUNG VENT/PERFUSION (VQ)   Final Result   Findings are consistent with a low probability of   pulmonary embolism. This result should be interpreted in conjunction with the clinical   pretest probability for pulmonary embolism. XR CHEST PORTABLE   Final Result   1. Stable, enlarged cardiomediastinal silhouette. .   2. Bibasilar infiltrate/pneumonia versus atelectasis versus a   combination of both with left pleural effusion. 3. Suspected underlying mild central pulmonary vascular congestion. 4. Right-sided central line as above.           Assessment:    Principal Problem:    Fluid overload  Active Problems:    Diabetes mellitus ([address]    Essential hypertension

## 2018-03-03 LAB
ALBUMIN SERPL-MCNC: 2.7 G/DL (ref 3.5–5.2)
ALP BLD-CCNC: 95 U/L (ref 40–129)
ALT SERPL-CCNC: 21 U/L (ref 0–40)
ANION GAP SERPL CALCULATED.3IONS-SCNC: 10 MMOL/L (ref 7–16)
AST SERPL-CCNC: 17 U/L (ref 0–39)
BILIRUB SERPL-MCNC: 0.4 MG/DL (ref 0–1.2)
BUN BLDV-MCNC: 33 MG/DL (ref 8–23)
CALCIUM SERPL-MCNC: 8.3 MG/DL (ref 8.6–10.2)
CHLORIDE BLD-SCNC: 101 MMOL/L (ref 98–107)
CO2: 25 MMOL/L (ref 22–29)
CREAT SERPL-MCNC: 4 MG/DL (ref 0.7–1.2)
GFR AFRICAN AMERICAN: 18
GFR NON-AFRICAN AMERICAN: 15 ML/MIN/1.73
GLUCOSE BLD-MCNC: 181 MG/DL (ref 74–109)
HCT VFR BLD CALC: 28 % (ref 37–54)
HEMOGLOBIN: 9.3 G/DL (ref 12.5–16.5)
MAGNESIUM: 2 MG/DL (ref 1.6–2.6)
MCH RBC QN AUTO: 31.7 PG (ref 26–35)
MCHC RBC AUTO-ENTMCNC: 33.2 % (ref 32–34.5)
MCV RBC AUTO: 95.6 FL (ref 80–99.9)
METER GLUCOSE: 169 MG/DL (ref 70–110)
METER GLUCOSE: 185 MG/DL (ref 70–110)
METER GLUCOSE: 215 MG/DL (ref 70–110)
METER GLUCOSE: 245 MG/DL (ref 70–110)
PDW BLD-RTO: 14.2 FL (ref 11.5–15)
PHOSPHORUS: 3.5 MG/DL (ref 2.5–4.5)
PLATELET # BLD: 214 E9/L (ref 130–450)
PMV BLD AUTO: 12.1 FL (ref 7–12)
POTASSIUM SERPL-SCNC: 4.9 MMOL/L (ref 3.5–5)
RBC # BLD: 2.93 E12/L (ref 3.8–5.8)
SODIUM BLD-SCNC: 136 MMOL/L (ref 132–146)
TOTAL PROTEIN: 5.7 G/DL (ref 6.4–8.3)
WBC # BLD: 5.2 E9/L (ref 4.5–11.5)

## 2018-03-03 PROCEDURE — 83735 ASSAY OF MAGNESIUM: CPT

## 2018-03-03 PROCEDURE — 9990 CHARGE CONVERSION

## 2018-03-03 PROCEDURE — G0257 UNSCHED DIALYSIS ESRD PT HOS: HCPCS

## 2018-03-03 PROCEDURE — 80053 COMPREHEN METABOLIC PANEL: CPT

## 2018-03-03 PROCEDURE — 76937 US GUIDE VASCULAR ACCESS: CPT

## 2018-03-03 PROCEDURE — 85027 COMPLETE CBC AUTOMATED: CPT

## 2018-03-03 PROCEDURE — 82962 GLUCOSE BLOOD TEST: CPT

## 2018-03-03 PROCEDURE — 36415 COLL VENOUS BLD VENIPUNCTURE: CPT

## 2018-03-03 PROCEDURE — 84100 ASSAY OF PHOSPHORUS: CPT

## 2018-03-03 RX ADMIN — HEPARIN SODIUM 5000 UNITS: 10000 INJECTION, SOLUTION INTRAVENOUS; SUBCUTANEOUS at 06:34

## 2018-03-03 RX ADMIN — Medication 10 ML: at 17:19

## 2018-03-03 RX ADMIN — BUMETANIDE 2 MG: 1 TABLET ORAL at 13:17

## 2018-03-03 RX ADMIN — HEPARIN SODIUM 5000 UNITS: 10000 INJECTION, SOLUTION INTRAVENOUS; SUBCUTANEOUS at 23:09

## 2018-03-03 RX ADMIN — BUMETANIDE 2 MG: 1 TABLET ORAL at 21:04

## 2018-03-03 RX ADMIN — Medication 10 ML: at 13:18

## 2018-03-03 RX ADMIN — INSULIN LISPRO 2 UNITS: 100 INJECTION, SOLUTION INTRAVENOUS; SUBCUTANEOUS at 06:34

## 2018-03-03 RX ADMIN — INSULIN LISPRO 1 UNITS: 100 INJECTION, SOLUTION INTRAVENOUS; SUBCUTANEOUS at 14:14

## 2018-03-03 RX ADMIN — HEPARIN SODIUM 5000 UNITS: 10000 INJECTION, SOLUTION INTRAVENOUS; SUBCUTANEOUS at 14:33

## 2018-03-03 RX ADMIN — INSULIN LISPRO 1 UNITS: 100 INJECTION, SOLUTION INTRAVENOUS; SUBCUTANEOUS at 21:03

## 2018-03-03 RX ADMIN — Medication 10 ML: at 23:09

## 2018-03-03 RX ADMIN — AMLODIPINE BESYLATE 5 MG: 5 TABLET ORAL at 13:17

## 2018-03-03 RX ADMIN — INSULIN LISPRO 1 UNITS: 100 INJECTION, SOLUTION INTRAVENOUS; SUBCUTANEOUS at 16:23

## 2018-03-03 ASSESSMENT — PAIN SCALES - GENERAL
PAINLEVEL_OUTOF10: 0

## 2018-03-03 NOTE — FLOWSHEET NOTE
03/03/18 1150   Vital Signs   BP (!) 150/84   Temp 98.5 °F (36.9 °C)   Pulse 83   Weight 184 lb 11.9 oz (83.8 kg)   Weight Method Actual   Percent Weight Change -3.46   Pain Assessment   Pain Assessment 0-10   Pain Level 0   Post-Hemodialysis Assessment   Post-Treatment Procedures Blood returned;Catheter capped, clamped and heparinized x 2 ports   Machine Disinfection Process Acid/Vinegar Clean;Exterior Machine Disinfection;Bleach; Machine Absence of Bleach Machine   Rinseback Volume (ml) 300 ml   Total Liters Processed (l/min) 77.7 l/min   Dialyzer Clearance Lightly streaked   Duration of Treatment (minutes) 240 minutes   Hemodialysis Intake (ml) 300 ml   Hemodialysis Output (ml) 3300 ml   NET Removed (ml) 3000 ml   Tolerated Treatment Good   Patient Response to Treatment Pt tolerated tx well. 3000mL net UF removed. Bilateral Breath Sounds Diminished;Clear   Edema Generalized;Right lower extremity; Left lower extremity   Edema Generalized +1;Non-pitting   RLE Edema +2;Pitting   LLE Edema +2;Pitting

## 2018-03-04 LAB
ANION GAP SERPL CALCULATED.3IONS-SCNC: 9 MMOL/L (ref 7–16)
BUN BLDV-MCNC: 25 MG/DL (ref 8–23)
CALCIUM SERPL-MCNC: 8.3 MG/DL (ref 8.6–10.2)
CHLORIDE BLD-SCNC: 98 MMOL/L (ref 98–107)
CO2: 29 MMOL/L (ref 22–29)
CREAT SERPL-MCNC: 3.1 MG/DL (ref 0.7–1.2)
GFR AFRICAN AMERICAN: 24
GFR NON-AFRICAN AMERICAN: 20 ML/MIN/1.73
GLUCOSE BLD-MCNC: 156 MG/DL (ref 74–109)
HCT VFR BLD CALC: 28 % (ref 37–54)
HEMOGLOBIN: 9.5 G/DL (ref 12.5–16.5)
MAGNESIUM: 1.9 MG/DL (ref 1.6–2.6)
MCH RBC QN AUTO: 32.1 PG (ref 26–35)
MCHC RBC AUTO-ENTMCNC: 33.9 % (ref 32–34.5)
MCV RBC AUTO: 94.6 FL (ref 80–99.9)
METER GLUCOSE: 151 MG/DL (ref 70–110)
METER GLUCOSE: 171 MG/DL (ref 70–110)
METER GLUCOSE: 178 MG/DL (ref 70–110)
METER GLUCOSE: 238 MG/DL (ref 70–110)
PDW BLD-RTO: 13.8 FL (ref 11.5–15)
PHOSPHORUS: 3.2 MG/DL (ref 2.5–4.5)
PLATELET # BLD: 216 E9/L (ref 130–450)
PMV BLD AUTO: 12.1 FL (ref 7–12)
POTASSIUM SERPL-SCNC: 4.6 MMOL/L (ref 3.5–5)
RBC # BLD: 2.96 E12/L (ref 3.8–5.8)
SODIUM BLD-SCNC: 136 MMOL/L (ref 132–146)
WBC # BLD: 4.6 E9/L (ref 4.5–11.5)

## 2018-03-04 PROCEDURE — 82962 GLUCOSE BLOOD TEST: CPT

## 2018-03-04 PROCEDURE — 94640 AIRWAY INHALATION TREATMENT: CPT

## 2018-03-04 PROCEDURE — 9990 CHARGE CONVERSION

## 2018-03-04 PROCEDURE — 85027 COMPLETE CBC AUTOMATED: CPT

## 2018-03-04 PROCEDURE — 84100 ASSAY OF PHOSPHORUS: CPT

## 2018-03-04 PROCEDURE — 80048 BASIC METABOLIC PNL TOTAL CA: CPT

## 2018-03-04 PROCEDURE — 36415 COLL VENOUS BLD VENIPUNCTURE: CPT

## 2018-03-04 PROCEDURE — 83735 ASSAY OF MAGNESIUM: CPT

## 2018-03-04 RX ADMIN — HEPARIN SODIUM 5000 UNITS: 10000 INJECTION, SOLUTION INTRAVENOUS; SUBCUTANEOUS at 21:26

## 2018-03-04 RX ADMIN — BUMETANIDE 2 MG: 1 TABLET ORAL at 09:21

## 2018-03-04 RX ADMIN — INSULIN LISPRO 1 UNITS: 100 INJECTION, SOLUTION INTRAVENOUS; SUBCUTANEOUS at 21:26

## 2018-03-04 RX ADMIN — Medication 10 ML: at 10:13

## 2018-03-04 RX ADMIN — Medication 10 ML: at 21:29

## 2018-03-04 RX ADMIN — INSULIN LISPRO 1 UNITS: 100 INJECTION, SOLUTION INTRAVENOUS; SUBCUTANEOUS at 09:21

## 2018-03-04 RX ADMIN — INSULIN LISPRO 1 UNITS: 100 INJECTION, SOLUTION INTRAVENOUS; SUBCUTANEOUS at 11:59

## 2018-03-04 RX ADMIN — INSULIN LISPRO 1 UNITS: 100 INJECTION, SOLUTION INTRAVENOUS; SUBCUTANEOUS at 16:40

## 2018-03-04 RX ADMIN — AMLODIPINE BESYLATE 5 MG: 5 TABLET ORAL at 09:20

## 2018-03-04 RX ADMIN — HEPARIN SODIUM 5000 UNITS: 10000 INJECTION, SOLUTION INTRAVENOUS; SUBCUTANEOUS at 06:42

## 2018-03-04 RX ADMIN — HEPARIN SODIUM 5000 UNITS: 10000 INJECTION, SOLUTION INTRAVENOUS; SUBCUTANEOUS at 14:47

## 2018-03-04 RX ADMIN — BUMETANIDE 2 MG: 1 TABLET ORAL at 21:27

## 2018-03-04 ASSESSMENT — PAIN SCALES - GENERAL
PAINLEVEL_OUTOF10: 0
PAINLEVEL_OUTOF10: 0

## 2018-03-04 NOTE — PROGRESS NOTES
Nephrology Progress Note  [facility]    Reason for Consult:  ESRD  Requesting Physician:  Dr Mary Waledn  Date of Service: 3/4/2018     Subjective: Pt being seen for ESRD    Patient seen and examined-family at bedsidea-no complaints  Resting   ROS limited by language             Past Medical History:        Diagnosis Date    CAP (community acquired pneumonia) 2/22/2016    [facility]     [facility]     Hypertension     Pneumonia        Past Surgical History:        Procedure Laterality Date    OTHER SURGICAL HISTORY Right 10/24/2016    right foot I&D with bone debridement and biopsy partial second digit amputation       Current Medications:    Current Facility-Administered Medications: bumetanide (BUMEX) tablet 2 mg, 2 mg, Oral, BID  darbepoetin jesús-polysorbate (ARANESP) injection 60 mcg, 60 mcg, Subcutaneous, Weekly  amLODIPine (NORVASC) tablet 5 mg, 5 mg, Oral, QAM  albuterol-ipratropium (COMBIVENT RESPIMAT)  MCG/ACT inhaler 1 puff, 1 puff, Inhalation, BID  sodium chloride flush 0.9 % injection 10 mL, 10 mL, Intravenous, 2 times per day  sodium chloride flush 0.9 % injection 10 mL, 10 mL, Intravenous, PRN  acetaminophen (TYLENOL) tablet 650 mg, 650 mg, Oral, Q4H PRN  magnesium hydroxide (MILK OF MAGNESIA) 400 MG/5ML suspension 30 mL, 30 mL, Oral, Daily PRN  ondansetron (ZOFRAN) injection 4 mg, 4 mg, Intravenous, Q6H PRN  heparin (porcine) injection 5,000 Units, 5,000 Units, Subcutaneous, 3 times per day  insulin lispro (HUMALOG) injection vial 0-6 Units, 0-6 Units, Subcutaneous, TID WC  insulin lispro (HUMALOG) injection vial 0-3 Units, 0-3 Units, Subcutaneous, Nightly  glucose (GLUTOSE) 40 % oral gel 15 g, 15 g, Oral, PRN  dextrose 50 % solution 12.5 g, 12.5 g, Intravenous, PRN  glucagon (rDNA) injection 1 mg, 1 mg, Intramuscular, PRN  dextrose 5 % solution, 100 mL/hr, Intravenous, PRN    Allergies:  Patient has no known allergies.       Physical exam: Constitutional:  VITALS:  /75   Pulse 76   Temp 98.5 °F (36.9 °C) (Oral)   Resp 16   Ht 5' 3\" (1.6 m)   Wt 186 lb (84.4 kg)   SpO2 99%   BMI 32.95 kg/m²     Gen: alert, awake, no acute distress  Eyes: eomi  HEENT: atraumatic/normocephalic, moist mucus membranes, R IJ TDC  Lungs: clear to ascultation bilaterally, equal lung expansion  CV: RRR, no murmurs, rub, gallop  Abdomen: soft, nontender, nondistended, normoactive bowel sounds  Extremitiy: no clubbing, cyanosis  1+ edema  : no CVA tenderness  Skin: no rash, tugor wnl  Neuro: no focal deficits  Psych: cooperative, limited by language barrier        Data:         Last 3 BMP  Recent Labs      03/03/18   0720  03/04/18   0658   NA  136  136   K  4.9  4.6   CL  101  98   CO2  25  29   BUN  33*  25*   CREATININE  4.0*  3.1*   GLUCOSE  181*  156*   CALCIUM  8.3*  8.3*         Last 3 CMP:    Recent Labs      03/03/18   0720  03/04/18   0658   NA  136  136   K  4.9  4.6   CL  101  98   CO2  25  29   BUN  33*  25*   CREATININE  4.0*  3.1*   GLUCOSE  181*  156*   CALCIUM  8.3*  8.3*   PROT  5.7*   --    LABALBU  2.7*   --    BILITOT  0.4   --    ALKPHOS  95   --    AST  17   --    ALT  21   --          Last 3 Glucose:     Recent Labs      03/03/18   0720  03/04/18   0658   GLUCOSE  181*  156*         Last 3 POC Glucose:     No results for input(s): POCGLU in the last 72 hours. Last 3 CK, CKMB, Troponin  No results for input(s): CKTOTAL, CKMB, TROPONINI in the last 72 hours.       Last 3 CBC:  Recent Labs      03/03/18   0720  03/04/18   0658   WBC  5.2  4.6   RBC  2.93*  2.96*   HGB  9.3*  9.5*   HCT  28.0*  28.0*   MCV  95.6  94.6   MCH  31.7  32.1   MCHC  33.2  33.9   RDW  14.2  13.8   PLT  214  216   MPV  12.1*  12.1*       Last 3 Hepatic Function Panel:    Recent Labs      03/03/18   0720   ALKPHOS  95   ALT  21   AST  17   PROT  5.7*   BILITOT  0.4   LABALBU  2.7*       Albumin:  Recent Labs      03/03/18   0720   LABALBU  2.7* Calcium:  Recent Labs      03/04/18   0658   CALCIUM  8.3*       Ionized Calcium:  No results for input(s): IONCA in the last 72 hours. Magnesium:    Recent Labs      03/04/18   0658   MG  1.9         ABGs:  No results for input(s): PHART, PO2ART, MEJ8CJF, ODM4TJC, BEART, V0SZHFNS in the last 72 hours. Lactic Acid:  No results for input(s): LACTA in the last 72 hours. Last 3 Amylase:  No results for input(s): AMYLASE in the last 72 hours. Last 3 Lipase:  No results for input(s): LIPASE in the last 72 hours. Last 3 BNP:  No results for input(s): BNP in the last 72 hours.                   Assessment/Plan      1 ESRD  Per report, started dialysis about one month ago while in [facility] in place  Will follow daily for dialysis needs  Plan IHD in the AM-per the pt son the medicaid card came in the mail yesrterday    2 Volume overload  UF as tolerated with HD    3 Hypertension with CKD V  BP at goal <140/90-follow with the vol removal  Follow and adjust regimen as needed    4 Hypophosphatemia  In setting of dialysis removal and possible poor intake   Follow and treat as needed  Replacement ordered on 2/28-PO4 WNL    5 Anemia in CKD -S/P transfusion on 3-1 of 1 unit PRBC-continue on the ARNOLDO  Follow Hgb    Thank you for the opportunity to participate in the care of  Mr Portillo Record     ______________________________      Toy Alegre MD  3/4/2018  4:01 PM

## 2018-03-04 NOTE — PROGRESS NOTES
9.5*   HCT  28.0*  28.0*   MCV  95.6  94.6   MCH  31.7  32.1   MCHC  33.2  33.9   RDW  14.2  13.8   PLT  214  216   MPV  12.1*  12.1*       CBC:   Lab Results   Component Value Date    WBC 4.6 03/04/2018    RBC 2.96 03/04/2018    HGB 9.5 03/04/2018    HCT 28.0 03/04/2018    MCV 94.6 03/04/2018    MCH 32.1 03/04/2018    MCHC 33.9 03/04/2018    RDW 13.8 03/04/2018     03/04/2018    MPV 12.1 03/04/2018     BMP:    Lab Results   Component Value Date     03/04/2018    K 4.6 03/04/2018    CL 98 03/04/2018    CO2 29 03/04/2018    BUN 25 03/04/2018    LABALBU 2.7 03/03/2018    CREATININE 3.1 03/04/2018    CALCIUM 8.3 03/04/2018    GFRAA 24 03/04/2018    LABGLOM 20 03/04/2018    GLUCOSE 156 03/04/2018     Phosphorus:    Lab Results   Component Value Date    PHOS 3.2 03/04/2018       Ref. Range 3/3/2018 07:20   Albumin Latest Ref Range: 3.5 - 5.2 g/dL 2.7 (L)   Alk Phos Latest Ref Range: 40 - 129 U/L 95   ALT Latest Ref Range: 0 - 40 U/L 21   AST Latest Ref Range: 0 - 39 U/L 17   Bilirubin Latest Ref Range: 0.0 - 1.2 mg/dL 0.4   Total Protein Latest Ref Range: 6.4 - 8.3 g/dL 5.7 (L)     Radiology:   US DVT LOWER BILATERAL COMPLETE   Final Result   Patent deep venous system of the Bilateral lower   extremity. No evidence for DVT. CT Chest WO Contrast   Final Result   1. Bilateral pleural effusions, left greater than right. 2. Small pericardial effusion. NM LUNG VENT/PERFUSION (VQ)   Final Result   Findings are consistent with a low probability of   pulmonary embolism. This result should be interpreted in conjunction with the clinical   pretest probability for pulmonary embolism. XR CHEST PORTABLE   Final Result   1. Stable, enlarged cardiomediastinal silhouette. .   2. Bibasilar infiltrate/pneumonia versus atelectasis versus a   combination of both with left pleural effusion. 3. Suspected underlying mild central pulmonary vascular congestion.    4. Right-sided

## 2018-03-05 LAB
ANION GAP SERPL CALCULATED.3IONS-SCNC: 11 MMOL/L (ref 7–16)
BUN BLDV-MCNC: 28 MG/DL (ref 8–23)
CALCIUM SERPL-MCNC: 8.4 MG/DL (ref 8.6–10.2)
CHLORIDE BLD-SCNC: 103 MMOL/L (ref 98–107)
CO2: 27 MMOL/L (ref 22–29)
CREAT SERPL-MCNC: 3.2 MG/DL (ref 0.7–1.2)
GFR AFRICAN AMERICAN: 24
GFR NON-AFRICAN AMERICAN: 20 ML/MIN/1.73
GLUCOSE BLD-MCNC: 192 MG/DL (ref 74–109)
HCT VFR BLD CALC: 28.1 % (ref 37–54)
HEMOGLOBIN: 9.4 G/DL (ref 12.5–16.5)
MAGNESIUM: 2 MG/DL (ref 1.6–2.6)
MCH RBC QN AUTO: 32.4 PG (ref 26–35)
MCHC RBC AUTO-ENTMCNC: 33.5 % (ref 32–34.5)
MCV RBC AUTO: 96.9 FL (ref 80–99.9)
METER GLUCOSE: 196 MG/DL (ref 70–110)
METER GLUCOSE: 220 MG/DL (ref 70–110)
METER GLUCOSE: 225 MG/DL (ref 70–110)
PDW BLD-RTO: 13.9 FL (ref 11.5–15)
PHOSPHORUS: 2.5 MG/DL (ref 2.5–4.5)
PLATELET # BLD: 214 E9/L (ref 130–450)
PMV BLD AUTO: 12.1 FL (ref 7–12)
POTASSIUM SERPL-SCNC: 4.6 MMOL/L (ref 3.5–5)
RBC # BLD: 2.9 E12/L (ref 3.8–5.8)
SODIUM BLD-SCNC: 141 MMOL/L (ref 132–146)
WBC # BLD: 4.7 E9/L (ref 4.5–11.5)

## 2018-03-05 PROCEDURE — 97530 THERAPEUTIC ACTIVITIES: CPT

## 2018-03-05 PROCEDURE — 94640 AIRWAY INHALATION TREATMENT: CPT

## 2018-03-05 PROCEDURE — 9990 CHARGE CONVERSION

## 2018-03-05 PROCEDURE — 36415 COLL VENOUS BLD VENIPUNCTURE: CPT

## 2018-03-05 PROCEDURE — 80048 BASIC METABOLIC PNL TOTAL CA: CPT

## 2018-03-05 PROCEDURE — 84100 ASSAY OF PHOSPHORUS: CPT

## 2018-03-05 PROCEDURE — 85027 COMPLETE CBC AUTOMATED: CPT

## 2018-03-05 PROCEDURE — 82962 GLUCOSE BLOOD TEST: CPT

## 2018-03-05 PROCEDURE — G0257 UNSCHED DIALYSIS ESRD PT HOS: HCPCS

## 2018-03-05 PROCEDURE — 83735 ASSAY OF MAGNESIUM: CPT

## 2018-03-05 RX ADMIN — HEPARIN SODIUM 5000 UNITS: 10000 INJECTION, SOLUTION INTRAVENOUS; SUBCUTANEOUS at 20:47

## 2018-03-05 RX ADMIN — BUMETANIDE 2 MG: 1 TABLET ORAL at 20:47

## 2018-03-05 RX ADMIN — Medication 10 ML: at 20:47

## 2018-03-05 RX ADMIN — HEPARIN SODIUM 5000 UNITS: 10000 INJECTION, SOLUTION INTRAVENOUS; SUBCUTANEOUS at 07:10

## 2018-03-05 RX ADMIN — INSULIN LISPRO 1 UNITS: 100 INJECTION, SOLUTION INTRAVENOUS; SUBCUTANEOUS at 20:48

## 2018-03-05 RX ADMIN — Medication 10 ML: at 08:18

## 2018-03-05 RX ADMIN — INSULIN LISPRO 2 UNITS: 100 INJECTION, SOLUTION INTRAVENOUS; SUBCUTANEOUS at 08:18

## 2018-03-05 RX ADMIN — INSULIN LISPRO 2 UNITS: 100 INJECTION, SOLUTION INTRAVENOUS; SUBCUTANEOUS at 12:08

## 2018-03-05 ASSESSMENT — PAIN SCALES - GENERAL
PAINLEVEL_OUTOF10: 0

## 2018-03-05 NOTE — PROGRESS NOTES
[facility] Hospitalist Progress Note    Admitting Date and Time: 2/21/2018  6:19 PM  Admit Dx: ESRD ON DIALYSIS    Subjective:  Denies CP ,sob or abdominal pain. Says uneventful night. ROS: denies fever, chills, cp, sob, n/v, HA unless stated above.      bumetanide  2 mg Oral BID    darbepoetin jesús-polysorbate  60 mcg Subcutaneous Weekly    amLODIPine  5 mg Oral QAM    albuterol-ipratropium  1 puff Inhalation BID    sodium chloride flush  10 mL Intravenous 2 times per day    heparin (porcine)  5,000 Units Subcutaneous 3 times per day    insulin lispro  0-6 Units Subcutaneous TID WC    insulin lispro  0-3 Units Subcutaneous Nightly       sodium chloride flush 10 mL PRN   acetaminophen 650 mg Q4H PRN   magnesium hydroxide 30 mL Daily PRN   ondansetron 4 mg Q6H PRN   glucose 15 g PRN   dextrose 12.5 g PRN   glucagon (rDNA) 1 mg PRN   dextrose 100 mL/hr PRN        Objective:    BP (!) 149/84   Pulse 81   Temp 98.8 °F (37.1 °C) (Oral)   Resp 14   Ht 5' 3\" (1.6 m)   Wt 189 lb (85.7 kg)   SpO2 97%   BMI 33.48 kg/m²   General Appearance: alert and oriented to person, place and time, well developed and well- nourished, in no acute distress  Skin: warm and dry, no rash or erythema  Head: normocephalic and atraumatic  Eyes: pupils equal, round, and reactive to light, extraocular eye movements intact, conjunctivae normal  ENT: tympanic membrane, external ear and ear canal normal bilaterally, nose without deformity, nasal mucosa and turbinates normal without polyps  Neck: supple and non-tender without mass, no thyromegaly or thyroid nodules, no cervical lymphadenopathy  Pulmonary/Chest: clear to auscultation bilaterally- no wheezes, rales or rhonchi, normal air movement, no respiratory distress  Cardiovascular: normal rate, regular rhythm, normal S1 and S2, no murmurs, rubs, clicks, or gallops, distal pulses intact, no carotid bruits  Abdomen: soft, non-tender, non-distended, normal bowel sounds, no masses or Post-Care Instructions: I reviewed with the patient in detail post-care instructions. Patient is to wear sunprotection, and avoid picking at any of the treated lesions. Pt may apply Vaseline to crusted or scabbing areas. organomegaly  Extremities: no cyanosis, clubbing or edema  Musculoskeletal: normal range of motion, no joint swelling, deformity or tenderness  Neurologic: reflexes normal and symmetric, no cranial nerve deficit, gait, coordination and speech normal      Recent Labs      03/03/18   0720  03/04/18   0658   NA  136  136   K  4.9  4.6   CL  101  98   CO2  25  29   BUN  33*  25*   CREATININE  4.0*  3.1*   GLUCOSE  181*  156*   CALCIUM  8.3*  8.3*       Recent Labs      03/03/18   0720  03/04/18   0658   WBC  5.2  4.6   RBC  2.93*  2.96*   HGB  9.3*  9.5*   HCT  28.0*  28.0*   MCV  95.6  94.6   MCH  31.7  32.1   MCHC  33.2  33.9   RDW  14.2  13.8   PLT  214  216   MPV  12.1*  12.1*       Labs and images reviewed     Radiology:   US DVT LOWER BILATERAL COMPLETE   Final Result   Patent deep venous system of the Bilateral lower   extremity. No evidence for DVT. CT Chest WO Contrast   Final Result   1. Bilateral pleural effusions, left greater than right. 2. Small pericardial effusion. NM LUNG VENT/PERFUSION (VQ)   Final Result   Findings are consistent with a low probability of   pulmonary embolism. This result should be interpreted in conjunction with the clinical   pretest probability for pulmonary embolism. XR CHEST PORTABLE   Final Result   1. Stable, enlarged cardiomediastinal silhouette. .   2. Bibasilar infiltrate/pneumonia versus atelectasis versus a   combination of both with left pleural effusion. 3. Suspected underlying mild central pulmonary vascular congestion. 4. Right-sided central line as above. Assessment:    Principal Problem:    Fluid overload  Active Problems:    Diabetes mellitus ([address]    Essential hypertension    ESRD on dialysis ([address]    Anemia  Resolved Problems:    * No resolved hospital problems. *      Plan:   Dyspnea : due to Fluid overload from missing dialysis. managed by renal with  dialysis.   VQ scan with  Low prob   PE, CT of Number Of Freeze-Thaw Cycles: 3 freeze-thaw cycles Duration Of Freeze Thaw-Cycle (Seconds): 3 Render Post-Care Instructions In Note?: yes Consent: The patient's consent was obtained including but not limited to risks of crusting, scabbing, blistering, scarring, darker or lighter pigmentary change, recurrence, incomplete removal and infection. Detail Level: Simple

## 2018-03-05 NOTE — PROGRESS NOTES
Occupational Therapy  OCCUPATIONAL THERAPY DAILY NOTE    Date:3/5/2018  Patient Name: Daniel Sanchez  MRN: [id number]  : 1950  Room: [address] [place] [address]A     Patient Active Problem List   Diagnosis    Diabetes mellitus ([place] [address]    Dementia with behavioral disturbance    MI (acute kidney injury) ([place] [address]    Hyperkalemia    SIADH (syndrome of inappropriate ADH production) ([place] [address]    Psychosis    Hyperglycemia    Essential hypertension    Constipation    Ascites    Bilateral leg edema    Pleural effusion, bilateral    Anasarca    Elevated troponin    Persistent atrial fibrillation (HCC)    ESRD on dialysis ([place] [address]    Fluid overload    Anemia       Subjective:  Pt in bed, agreeable to therapy and cleared with nursing  Precautions: fall risk, Irish speaking  Chart Reviewed:  Yes          Independent Supervision Contact Guard Assist Minimal Assist Moderate Assist Maximum Assist Dependent   Feeding          Grooming          UE  Bathing          LE Bathing          UE Dressing          LE  Dressing                    Toileting                   Comments: Mod A to don shoes, pt receives assist from family    Functional Transfers:  Supine to sit independent. Sit to stand supervision. Functional mobility in daniel without device sba/cga, pt occasionally unsteady. Stand to sit supervision. Therapeutic Exercises:  Good use of UEs for support during functional transfers    Other:  Good tolerance of activity. Family assisted with communication. Pt returned to bed with call light in reach    Education:  safety    Equipment Recommendations:  Continue to assess    AM-PAC Inpatient Daily Activity Raw Score:  16    Pain Level:  /10   Additional Notes:  No complaints  Patient has made good progress during treatment sessions toward set goals. [x] Continue with current OT Plan of care.   [] Prepare for Discharge    [facility] Jimmy ALVARADO/L [id number]    Total Tx Time: 15

## 2018-03-05 NOTE — PROGRESS NOTES
Physical Therapy  Facility/Department: [facility] MED SURG  Treatment Note  NAME: Madison Mai  : 1950  MRN: [id number]    Date of Service: 3/5/2018    Patient Diagnosis(es):   Patient Active Problem List    Diagnosis Date Noted    [facility] ([facility] 2018    Fluid overload 2018    Anemia 2018    Elevated troponin     [facility]istent [facility] fibrillation ([facility]     Essential hypertension 2016    Constipation 2016    Ascites 2016    Bilateral leg edema 2016    Pleural effusion, bilateral 2016    Anasarca 2016    Hyperglycemia     Dementia with behavioral disturbance 10/18/2016    MI (acute kidney injury) ([facility] 10/18/2016    Hyperkalemia 10/18/2016    SIADH (syndrome of inappropriate ADH production) ([facility] 10/18/2016    Psychosis     Diabetes mellitus ([facility] 2016       Past Medical History:   Diagnosis Date    CAP (community acquired pneumonia) 2016    Diabetes mellitus ([facility]     Dialysis patient ([facility]     Hypertension     Pneumonia      Past Surgical History:   Procedure Laterality Date    OTHER SURGICAL HISTORY Right 10/24/2016    right foot I&D with bone debridement and biopsy partial second digit amputation     Evaluating Therapist: Richard Bryan PT         Room #: 521   DIAGNOSIS:  ESRD on HD      PRECAUTIONS: falls, language barrier - speaks Khmer      Social:  Pt lives with family  in a  1 floor set up plan  Prior to admission pt walked with no AD        Initial Evaluation  Date:  18  Treatment     3/5 Short Term/ Long Term   Goals   Was pt agreeable to Eval/treatment? Yes   yes     Does pt have pain?  none reported  No c/o pain      Bed Mobility  Rolling:  NT   Supine to sit:  NT   Sit to supine:  NT   Scooting:  NT  Pt sitting in chair  Rolling: Independent  Supine to sit:  Independent  independent    Transfers Sit to stand:  Supervision   Stand to sit:  Supervision   Stand pivot:  SBA   independent  independent Ambulation    140  feet with  No AD  with  SBA   450 feet with no device CG/SBA  200 feet with  No AD  with  Independent    Stair negotiation: ascended and descended [employer] with  1 rail with  CGA  13 with 1 rail for support CGA, step to pattern used  12  steps with 1  rail with  Supervision    LE ROM  WFL        LE strength  WFL        AM- PAC RAW score  18/ 24 21/24       Balance: fair/fair minus dynamic without A/D    Patient education  Pt was educated on stair negotiation promoting step to pattern descending    Patient response to education:   Pt verbalized understanding Pt demonstrated skill Pt requires further education in this area   no With prompt yes     Additional Comments: Nurse ok with Rx, Family member present and explained PT intentions for Pt to walk, Pt speaks Irish only. Pt at times unsteady during gait with occasional loss of balance but balance improved with gait progression. Pt was left seated EOB after rx with call light in reach. Treatment time: 10 minutes  Time out: 0702    Pt is making fair progress toward established Physical Therapy goals. Continue with physical therapy current plan of care.     Prashant Mccain PTA   License Number: [id number]

## 2018-03-05 NOTE — PROGRESS NOTES
Nephrology Progress Note  [facility]    Reason for Consult:  ESRD  Requesting Physician:  Dr Gustavo Garcia  Date of Service: 3/5/2018     Subjective: Pt being seen for ESRD    Patient seen and examined-family at bedside-no complaints  Resting   ROS limited by language             Past Medical History:        Diagnosis Date    CAP (community acquired pneumonia) 2/22/2016    [facility]     [facility] [facility]     Hypertension     Pneumonia        Past Surgical History:        Procedure Laterality Date    OTHER SURGICAL HISTORY Right 10/24/2016    right foot I&D with bone debridement and biopsy partial second digit amputation       Current Medications:    Current Facility-Administered Medications: bumetanide (BUMEX) tablet 2 mg, 2 mg, Oral, BID  darbepoetin jesús-polysorbate (ARANESP) injection 60 mcg, 60 mcg, Subcutaneous, Weekly  amLODIPine (NORVASC) tablet 5 mg, 5 mg, Oral, QAM  albuterol-ipratropium (COMBIVENT RESPIMAT)  MCG/ACT inhaler 1 puff, 1 puff, Inhalation, BID  sodium chloride flush 0.9 % injection 10 mL, 10 mL, Intravenous, 2 times per day  sodium chloride flush 0.9 % injection 10 mL, 10 mL, Intravenous, PRN  acetaminophen (TYLENOL) tablet 650 mg, 650 mg, Oral, Q4H PRN  magnesium hydroxide (MILK OF MAGNESIA) 400 MG/5ML suspension 30 mL, 30 mL, Oral, Daily PRN  ondansetron (ZOFRAN) injection 4 mg, 4 mg, Intravenous, Q6H PRN  heparin (porcine) injection 5,000 Units, 5,000 Units, Subcutaneous, 3 times per day  insulin lispro (HUMALOG) injection vial 0-6 Units, 0-6 Units, Subcutaneous, TID WC  insulin lispro (HUMALOG) injection vial 0-3 Units, 0-3 Units, Subcutaneous, Nightly  glucose (GLUTOSE) 40 % oral gel 15 g, 15 g, Oral, PRN  dextrose 50 % solution 12.5 g, 12.5 g, Intravenous, PRN  glucagon (rDNA) injection 1 mg, 1 mg, Intramuscular, PRN  dextrose 5 % solution, 100 mL/hr, Intravenous, PRN    Allergies:  Patient has no known allergies.       Physical exam: Calcium:  Recent Labs      03/04/18   0658   CALCIUM  8.3*       Ionized Calcium:  No results for input(s): IONCA in the last 72 hours. Magnesium:    Recent Labs      03/04/18   0658   MG  1.9         ABGs:  No results for input(s): PHART, PO2ART, NHR4UQG, WEA9OKI, BEART, [id number] in the last 72 hours. Lactic Acid:  No results for input(s): LACTA in the last 72 hours. Last 3 Amylase:  No results for input(s): AMYLASE in the last 72 hours. Last 3 Lipase:  No results for input(s): LIPASE in the last 72 hours. Last 3 BNP:  No results for input(s): BNP in the last 72 hours.                   Assessment/Plan      1 ESRD  Per report, started dialysis about one month ago while in [facility] in place  Will follow daily for dialysis needs  Plan IHD this PM-await the completion of outpt arrangements    2 Volume overload  UF as tolerated with HD    3 Hypertension with CKD V  BP above goal <140/90-follow with the vol removal  Follow and adjust regimen as needed    4 Hypophosphatemia  In setting of dialysis removal and possible poor intake   Follow and treat as needed  Replacement ordered on 2/28-PO4 WNL    5 Anemia in CKD -S/P transfusion on 3-1 of 1 unit PRBC-continue on the ARNOLDO  Follow Hgb    Thank you for the opportunity to participate in the care of  Mr Hong Herron     ______________________________      Lesa Jenkins MD  3/5/2018  1:18 PM

## 2018-03-05 NOTE — FLOWSHEET NOTE
03/05/18 1724   Vital Signs   BP (!) 157/91   Temp 98.5 °F (36.9 °C)   Pulse 78   Weight 190 lb 0.6 oz (86.2 kg)   Weight Method Bed scale   Percent Weight Change -1.82   Pain Assessment   Pain Assessment 0-10   Pain Level 0   Post-Hemodialysis Assessment   Post-Treatment Procedures Blood returned;Catheter capped, clamped and heparinized x 2 ports   Machine Disinfection Process Exterior Machine Disinfection   Rinseback Volume (ml) 300 ml   Total Liters Processed (l/min) 60.1 l/min   Dialyzer Clearance Lightly streaked   Duration of Treatment (minutes) 240 minutes   Heparin amount administered during treatment (units) 0 units   Hemodialysis Intake (ml) 300 ml   Hemodialysis Output (ml) 2700 ml   NET Removed (ml) 2400 ml   Tolerated Treatment Good   Patient Response to Treatment tolerated well; 2400 cc net fluid removed   Bilateral Breath Sounds Diminished   Edema Generalized;Right lower extremity; Left lower extremity   Edema Generalized +1   RLE Edema +3;Pitting   LLE Edema +3;Pitting   Physician Notified?  No

## 2018-03-05 NOTE — PATIENT CARE CONFERENCE
[facility] Quality Flow/Interdisciplinary Rounds Progress Note        Quality Flow Rounds held on March 5, 2018    Disciplines Attending:  Bedside Nurse, ,  and Nursing Unit Leadership    Shaun Rodríguez was admitted on 2/21/2018  6:19 PM    Anticipated Discharge Date:  Expected Discharge Date: 02/24/18    Disposition:    Walker Score:  Walker Scale Score: 19    Readmission Risk              Readmission Risk:        20.75       Age 72 or Greater:  1    Admitted from SNF or Requires Paid or Family Care:  0    Currently has CHF,COPD,ARF,CRI,or is on dialysis:  0    Takes more than 5 Prescription Medications:  4    Takes Digoxin,Insulin,Anticoagulants,Narcotics or ASA/Plavix:  [facility] in Past 12 Months:  10    On Disability:  0    Patient Considers own Health:  3.75          Discussed patient goal for the day, patient clinical progression, and barriers to discharge.   The following Goal(s) of the Day/Commitment(s) have been identified:  Labs - Report Results      Bhupinder Nix  March 5, 2018

## 2018-03-06 LAB
Lab: NORMAL
METER GLUCOSE: 111 MG/DL (ref 70–110)
METER GLUCOSE: 204 MG/DL (ref 70–110)
METER GLUCOSE: 206 MG/DL (ref 70–110)
METER GLUCOSE: 276 MG/DL (ref 70–110)
REPORT: NORMAL
THIS TEST SENT TO: NORMAL

## 2018-03-06 PROCEDURE — 82962 GLUCOSE BLOOD TEST: CPT

## 2018-03-06 PROCEDURE — 9990 CHARGE CONVERSION

## 2018-03-06 PROCEDURE — 3E03317 INTRODUCTION OF OTHER THROMBOLYTIC INTO PERIPHERAL VEIN, PERCUTANEOUS APPROACH: ICD-10-PCS | Performed by: INTERNAL MEDICINE

## 2018-03-06 PROCEDURE — 94640 AIRWAY INHALATION TREATMENT: CPT

## 2018-03-06 PROCEDURE — G0257 UNSCHED DIALYSIS ESRD PT HOS: HCPCS

## 2018-03-06 RX ORDER — HEPARIN SODIUM 1000 [USP'U]/ML
3200 INJECTION, SOLUTION INTRAVENOUS; SUBCUTANEOUS PRN
Status: DISCONTINUED | OUTPATIENT
Start: 2018-03-06 | End: 2018-03-16 | Stop reason: HOSPADM

## 2018-03-06 RX ADMIN — Medication 10 ML: at 21:16

## 2018-03-06 RX ADMIN — INSULIN LISPRO 2 UNITS: 100 INJECTION, SOLUTION INTRAVENOUS; SUBCUTANEOUS at 17:03

## 2018-03-06 RX ADMIN — AMLODIPINE BESYLATE 5 MG: 5 TABLET ORAL at 13:46

## 2018-03-06 RX ADMIN — HEPARIN SODIUM 5000 UNITS: 10000 INJECTION, SOLUTION INTRAVENOUS; SUBCUTANEOUS at 21:07

## 2018-03-06 RX ADMIN — HEPARIN SODIUM 5000 UNITS: 10000 INJECTION, SOLUTION INTRAVENOUS; SUBCUTANEOUS at 05:45

## 2018-03-06 RX ADMIN — Medication 10 ML: at 13:46

## 2018-03-06 RX ADMIN — BUMETANIDE 2 MG: 1 TABLET ORAL at 13:45

## 2018-03-06 RX ADMIN — INSULIN LISPRO 1 UNITS: 100 INJECTION, SOLUTION INTRAVENOUS; SUBCUTANEOUS at 21:06

## 2018-03-06 RX ADMIN — HEPARIN SODIUM 5000 UNITS: 10000 INJECTION, SOLUTION INTRAVENOUS; SUBCUTANEOUS at 13:52

## 2018-03-06 ASSESSMENT — PAIN SCALES - GENERAL
PAINLEVEL_OUTOF10: 0

## 2018-03-06 NOTE — PROGRESS NOTES
[facility] Hospitalist Progress Note    Admitting Date and Time: 2/21/2018  6:19 PM  Admit Dx: ESRD ON DIALYSIS    Subjective:  Denies CP ,sob ,abdominal pain or other complaints. ROS: denies fever, chills, cp, sob, n/v, HA unless stated above.      bumetanide  2 mg Oral BID    darbepoetin jesús-polysorbate  60 mcg Subcutaneous Weekly    amLODIPine  5 mg Oral QAM    albuterol-ipratropium  1 puff Inhalation BID    sodium chloride flush  10 mL Intravenous 2 times per day    heparin (porcine)  5,000 Units Subcutaneous 3 times per day    insulin lispro  0-6 Units Subcutaneous TID WC    insulin lispro  0-3 Units Subcutaneous Nightly       heparin (porcine) 3,200 Units PRN   sodium chloride flush 10 mL PRN   acetaminophen 650 mg Q4H PRN   magnesium hydroxide 30 mL Daily PRN   ondansetron 4 mg Q6H PRN   glucose 15 g PRN   dextrose 12.5 g PRN   glucagon (rDNA) 1 mg PRN   dextrose 100 mL/hr PRN        Objective:    BP (!) 170/89   Pulse 86   Temp 98.4 °F (36.9 °C) (Oral)   Resp 16   Ht 5' 3\" (1.6 m)   Wt 191 lb 5.8 oz (86.8 kg)   SpO2 97%   BMI 33.90 kg/m²   General Appearance: alert and oriented to person, place and time, well developed and well- nourished, in no acute distress  Skin: warm and dry, no rash or erythema  Head: normocephalic and atraumatic  Eyes: pupils equal, round, and reactive to light, extraocular eye movements intact, conjunctivae normal  Neck: supple and non-tender without mass  Pulmonary/Chest: clear to auscultation bilaterally  Cardiovascular: normal rate, regular rhythm, normal S1 and S2  Abdomen: soft, non-tender, non-distended, normal bowel sounds, no masses or organomegaly  Extremities: no cyanosis, clubbing or edema  Musculoskeletal: normal range of motion, no joint swelling, deformity or tenderness  Neurologic: reflexes normal and symmetric, no cranial nerve deficit, speech normal         Recent Labs      03/04/18   0658  03/05/18   1330   NA  136  141   K  4.6  4.6   CL  98 103   CO2  29  27   BUN  25*  28*   CREATININE  3.1*  3.2*   GLUCOSE  156*  192*   CALCIUM  8.3*  8.4*       Recent Labs      03/04/18   0658  03/05/18   1330   WBC  4.6  4.7   RBC  2.96*  2.90*   HGB  9.5*  9.4*   HCT  28.0*  28.1*   MCV  94.6  96.9   MCH  32.1  32.4   MCHC  33.9  33.5   RDW  13.8  13.9   PLT  216  214   MPV  12.1*  12.1*       Labs and images reviewed     Radiology:   US DVT LOWER BILATERAL COMPLETE   Final Result   Patent deep venous system of the Bilateral lower   extremity. No evidence for DVT. CT Chest WO Contrast   Final Result   1. Bilateral pleural effusions, left greater than right. 2. Small pericardial effusion. NM LUNG VENT/PERFUSION (VQ)   Final Result   Findings are consistent with a low probability of   pulmonary embolism. This result should be interpreted in conjunction with the clinical   pretest probability for pulmonary embolism. XR CHEST PORTABLE   Final Result   1. Stable, enlarged cardiomediastinal silhouette. .   2. Bibasilar infiltrate/pneumonia versus atelectasis versus a   combination of both with left pleural effusion. 3. Suspected underlying mild central pulmonary vascular congestion. 4. Right-sided central line as above. Assessment:    Principal Problem:    Fluid overload  Active Problems:    Diabetes mellitus ([address]    Essential hypertension    ESRD on dialysis ([address]    Anemia  Resolved Problems:    * No resolved hospital problems. *      Plan:  Dyspnea : due to Fluid overload from missing dialysis. managed by renal with  dialysis. VQ scan with  Low prob   PE, CT of the chest showed moderate left sided pleural effusion, no signs of pneumonia. Now resolved and is on Ra.     ESRD on HD :  renal managing dialysis ,and needs out pt HD arranged.     Hypertension: on  amlodipine.  Monitor .     Anemia of chronic disease-on  Aranesp ,s/p 1 U PRBcs ,monitor.     DMI - on ISS ,monitor.     Deconditioning: Improving

## 2018-03-06 NOTE — PROGRESS NOTES
Constitutional:  VITALS:  BP (!) 168/94   Pulse 81   Temp 98.6 °F (37 °C)   Resp 16   Ht 5' 3\" (1.6 m)   Wt 191 lb 5.8 oz (86.8 kg)   SpO2 95%   BMI 33.90 kg/m²     Gen: alert, awake, no acute distress  Eyes: eomi  HEENT: atraumatic/normocephalic, moist mucus membranes, R IJ TDC  Lungs: clear to ascultation bilaterally, equal lung expansion  CV: RRR, no murmurs, rub, gallop  Abdomen: soft, nontender, nondistended, normoactive bowel sounds  Extremitiy: no clubbing, cyanosis  1+ edema  : no CVA tenderness  Skin: no rash, tugor wnl  Neuro: no focal deficits  Psych: cooperative, limited by language barrier        Data:         Last 3 BMP  Recent Labs      03/04/18   0658  03/05/18   1330   NA  136  141   K  4.6  4.6   CL  98  103   CO2  29  27   BUN  25*  28*   CREATININE  3.1*  3.2*   GLUCOSE  156*  192*   CALCIUM  8.3*  8.4*         Last 3 CMP:    Recent Labs      03/04/18   0658  03/05/18   1330   NA  136  141   K  4.6  4.6   CL  98  103   CO2  29  27   BUN  25*  28*   CREATININE  3.1*  3.2*   GLUCOSE  156*  192*   CALCIUM  8.3*  8.4*         Last 3 Glucose:     Recent Labs      03/04/18   0658  03/05/18   1330   GLUCOSE  156*  192*         Last 3 POC Glucose:     No results for input(s): POCGLU in the last 72 hours. Last 3 CK, CKMB, Troponin  No results for input(s): CKTOTAL, CKMB, TROPONINI in the last 72 hours. Last 3 CBC:  Recent Labs      03/04/18   0658  03/05/18   1330   WBC  4.6  4.7   RBC  2.96*  2.90*   HGB  9.5*  9.4*   HCT  28.0*  28.1*   MCV  94.6  96.9   MCH  32.1  32.4   MCHC  33.9  33.5   RDW  13.8  13.9   PLT  216  214   MPV  12.1*  12.1*       Last 3 Hepatic Function Panel:    No results for input(s): ALKPHOS, ALT, AST, PROT, BILITOT, BILIDIR, LABALBU in the last 72 hours. Albumin:  No results for input(s): LABALBU in the last 72 hours.     Calcium:  Recent Labs      03/05/18   1330   CALCIUM  8.4*       Ionized Calcium:  No results for input(s): IONCA in the last 72

## 2018-03-06 NOTE — PLAN OF CARE
Problem: Falls - Risk of  Goal: Absence of falls  Outcome: Ongoing      Problem: Tissue Perfusion - Renal, Altered:  Goal: Electrolytes within specified parameters  Electrolytes within specified parameters   Outcome: Ongoing      Problem: Fluid Volume - Imbalance:  Goal: Absence of imbalanced fluid volume signs and symptoms  Absence of imbalanced fluid volume signs and symptoms   Outcome: Ongoing

## 2018-03-07 LAB
METER GLUCOSE: 123 MG/DL (ref 70–110)
METER GLUCOSE: 162 MG/DL (ref 70–110)
METER GLUCOSE: 190 MG/DL (ref 70–110)
METER GLUCOSE: 208 MG/DL (ref 70–110)

## 2018-03-07 PROCEDURE — 9990 CHARGE CONVERSION

## 2018-03-07 PROCEDURE — 82962 GLUCOSE BLOOD TEST: CPT

## 2018-03-07 PROCEDURE — 94640 AIRWAY INHALATION TREATMENT: CPT

## 2018-03-07 RX ORDER — METOPROLOL SUCCINATE 50 MG/1
50 TABLET, EXTENDED RELEASE ORAL NIGHTLY
Status: DISCONTINUED | OUTPATIENT
Start: 2018-03-07 | End: 2018-03-16 | Stop reason: HOSPADM

## 2018-03-07 RX ADMIN — BUMETANIDE 2 MG: 1 TABLET ORAL at 21:27

## 2018-03-07 RX ADMIN — HEPARIN SODIUM 5000 UNITS: 10000 INJECTION, SOLUTION INTRAVENOUS; SUBCUTANEOUS at 15:12

## 2018-03-07 RX ADMIN — INSULIN LISPRO 1 UNITS: 100 INJECTION, SOLUTION INTRAVENOUS; SUBCUTANEOUS at 12:26

## 2018-03-07 RX ADMIN — INSULIN LISPRO 2 UNITS: 100 INJECTION, SOLUTION INTRAVENOUS; SUBCUTANEOUS at 10:07

## 2018-03-07 RX ADMIN — BUMETANIDE 2 MG: 1 TABLET ORAL at 10:06

## 2018-03-07 RX ADMIN — HEPARIN SODIUM 5000 UNITS: 10000 INJECTION, SOLUTION INTRAVENOUS; SUBCUTANEOUS at 21:26

## 2018-03-07 RX ADMIN — AMLODIPINE BESYLATE 5 MG: 5 TABLET ORAL at 10:06

## 2018-03-07 RX ADMIN — Medication 10 ML: at 21:27

## 2018-03-07 RX ADMIN — Medication 10 ML: at 10:07

## 2018-03-07 RX ADMIN — Medication 10 ML: at 10:06

## 2018-03-07 RX ADMIN — HEPARIN SODIUM 5000 UNITS: 10000 INJECTION, SOLUTION INTRAVENOUS; SUBCUTANEOUS at 06:51

## 2018-03-07 RX ADMIN — METOPROLOL SUCCINATE 50 MG: 50 TABLET, FILM COATED, EXTENDED RELEASE ORAL at 21:27

## 2018-03-07 ASSESSMENT — PAIN SCALES - GENERAL
PAINLEVEL_OUTOF10: 0

## 2018-03-07 NOTE — PROGRESS NOTES
Physical Therapy  Facility/Department: [facility] MED SURG  Daily Treatment Note  NAME: Donna Beatty  : 1950  MRN: [id number]    Date of Service: 3/7/2018    PT treatment was attempted this afternoon and pt states he has been up walking in the halls multiple times today with his family and I did see him walking and sitting in the chairs at the end of the daniel. Pt does not feel like getting up to walk at this time. Will re-attempt another time. Chantelle Rene., P.T.   License Number: [id number]

## 2018-03-07 NOTE — PLAN OF CARE
Problem: Falls - Risk of  Goal: Absence of falls  Outcome: Met This Shift      Problem: Fluid Volume - Imbalance:  Goal: Absence of imbalanced fluid volume signs and symptoms  Absence of imbalanced fluid volume signs and symptoms   Outcome: Met This Shift

## 2018-03-07 NOTE — PROGRESS NOTES
Nephrology Progress Note  [facility]    Reason for Consult:  ESRD  Requesting Physician:  Dr Consuelo Aguayo  Date of Service: 3/7/2018     Subjective: Pt being seen for ESRD    Patient seen and examined family at bedside and very anxious to get pt home-no complaints  Resting comfortably  ROS limited by language             Past Medical History:        Diagnosis Date    CAP (community acquired pneumonia) 2/22/2016    [facility] ([facility] [address]     Dialysis patient ([facility] [address]     Hypertension     Pneumonia        Past Surgical History:        Procedure Laterality Date    OTHER SURGICAL HISTORY Right 10/24/2016    right foot I&D with bone debridement and biopsy partial second digit amputation       Current Medications:    Current Facility-Administered Medications: heparin (porcine) injection 3,200 Units, 3,200 Units, Intercatheter, PRN  bumetanide (BUMEX) tablet 2 mg, 2 mg, Oral, BID  darbepoetin jesús-polysorbate (ARANESP) injection 60 mcg, 60 mcg, Subcutaneous, Weekly  amLODIPine (NORVASC) tablet 5 mg, 5 mg, Oral, QAM  albuterol-ipratropium (COMBIVENT RESPIMAT)  MCG/ACT inhaler 1 puff, 1 puff, Inhalation, BID  sodium chloride flush 0.9 % injection 10 mL, 10 mL, Intravenous, 2 times per day  sodium chloride flush 0.9 % injection 10 mL, 10 mL, Intravenous, PRN  acetaminophen (TYLENOL) tablet 650 mg, 650 mg, Oral, Q4H PRN  magnesium hydroxide (MILK OF MAGNESIA) 400 MG/5ML suspension 30 mL, 30 mL, Oral, Daily PRN  ondansetron (ZOFRAN) injection 4 mg, 4 mg, Intravenous, Q6H PRN  heparin (porcine) injection 5,000 Units, 5,000 Units, Subcutaneous, 3 times per day  insulin lispro (HUMALOG) injection vial 0-6 Units, 0-6 Units, Subcutaneous, TID WC  insulin lispro (HUMALOG) injection vial 0-3 Units, 0-3 Units, Subcutaneous, Nightly  glucose (GLUTOSE) 40 % oral gel 15 g, 15 g, Oral, PRN  dextrose 50 % solution 12.5 g, 12.5 g, Intravenous, PRN  glucagon (rDNA) injection 1 mg, 1 mg, Intramuscular, PRN  dextrose 5 % [id number], [id number], BEART, [id number] in the last 72 hours. Lactic Acid:  No results for input(s): LACTA in the last 72 hours. Last 3 Amylase:  No results for input(s): AMYLASE in the last 72 hours. Last 3 Lipase:  No results for input(s): LIPASE in the last 72 hours. Last 3 BNP:  No results for input(s): BNP in the last 72 hours.                   Assessment/Plan      1 ESRD  Per report, started dialysis about one month ago while in [facility]  R PANKAJ Toney. [address] in [address]-QB poor this Am at 250ml-will trial Activase post IHD this AM  Will follow daily for dialysis needs  await the completion of outpt arrangements-I spoke with Blayne Reid from [employer]  And as the medicaid has been submitted theyb will begin the approval process to get him admitted to a unit , hopefully should be able to get him into a clinic in the next 24hrs    2 Volume overload  UF as tolerated with HD-UFR increased given the HTN and evidence of edema bilat    3 Hypertension with CKD V  BP above goal <140/90-follow with the vol removal  Add BB    4 Hypophosphatemia  In setting of dialysis removal and possible poor intake   Follow and treat as needed  Replacement ordered on 2/28-Last PO4 WNL-recheck in the AM    5 Anemia in CKD -S/P transfusion on 3-1 of 1 unit PRBC-continue on the ARNOLDO  Follow Hgb    Thank you for the opportunity to participate in the care of  Mr Karthik Nixon     ______________________________      Peg Flores MD  3/7/2018  3:34 PM

## 2018-03-07 NOTE — PROGRESS NOTES
3.2*   GLUCOSE  192*   CALCIUM  8.4*       Recent Labs      03/05/18   1330   WBC  4.7   RBC  2.90*   HGB  9.4*   HCT  28.1*   MCV  96.9   MCH  32.4   MCHC  33.5   RDW  13.9   PLT  214   MPV  12.1*       Labs and Images reviewed     Radiology:   US DVT LOWER BILATERAL COMPLETE   Final Result   Patent deep venous system of the Bilateral lower   extremity. No evidence for DVT. CT Chest WO Contrast   Final Result   1. Bilateral pleural effusions, left greater than right. 2. Small pericardial effusion. NM LUNG VENT/PERFUSION (VQ)   Final Result   Findings are consistent with a low probability of   pulmonary embolism. This result should be interpreted in conjunction with the clinical   pretest probability for pulmonary embolism. XR CHEST PORTABLE   Final Result   1. Stable, enlarged cardiomediastinal silhouette. .   2. Bibasilar infiltrate/pneumonia versus atelectasis versus a   combination of both with left pleural effusion. 3. Suspected underlying mild central pulmonary vascular congestion. 4. Right-sided central line as above. Assessment:    Principal Problem:    Fluid overload  Active Problems:    Diabetes mellitus ([address]    Essential hypertension    ESRD on dialysis ([address]    Anemia  Resolved Problems:    * No resolved hospital problems. *      Plan:  Dyspnea : due to Fluid overload from missing dialysis. managed by renal with  dialysis.  VQ scan with  Low prob   PE, CT of the chest showed moderate left sided pleural effusion, no signs of pneumonia. Now resolved and is on Ra.     ESRD on HD :  renal managing dialysis ,and needs out pt HD arranged.     Hypertension: on  amlodipine.  Monitor .     Anemia of chronic disease-on  Aranesp ,s/p 1 U PRBcs ,monitor.     DMI - on ISS ,monitor.     Deconditioning: Improving with improving vol status       aspergillus sputum culture: ID signed off 3/2/18 - states no need to repeat cultures or treat Aspergillus colonization of the airways.     On sc heparin for dvt prophy     Full code.             Electronically signed by Moni Cardenas MD on 3/7/2018 at 9:46 AM

## 2018-03-08 LAB
ALBUMIN SERPL-MCNC: 2.8 G/DL (ref 3.5–5.2)
ALP BLD-CCNC: 102 U/L (ref 40–129)
ALT SERPL-CCNC: 19 U/L (ref 0–40)
ANION GAP SERPL CALCULATED.3IONS-SCNC: 8 MMOL/L (ref 7–16)
AST SERPL-CCNC: 18 U/L (ref 0–39)
BILIRUB SERPL-MCNC: 0.4 MG/DL (ref 0–1.2)
BUN BLDV-MCNC: 27 MG/DL (ref 8–23)
CALCIUM SERPL-MCNC: 8.5 MG/DL (ref 8.6–10.2)
CHLORIDE BLD-SCNC: 101 MMOL/L (ref 98–107)
CO2: 26 MMOL/L (ref 22–29)
CREAT SERPL-MCNC: 4 MG/DL (ref 0.7–1.2)
GFR AFRICAN AMERICAN: 18
GFR NON-AFRICAN AMERICAN: 15 ML/MIN/1.73
GLUCOSE BLD-MCNC: 131 MG/DL (ref 74–109)
HCT VFR BLD CALC: 28.4 % (ref 37–54)
HEMOGLOBIN: 9.5 G/DL (ref 12.5–16.5)
MAGNESIUM: 1.9 MG/DL (ref 1.6–2.6)
MCH RBC QN AUTO: 31.9 PG (ref 26–35)
MCHC RBC AUTO-ENTMCNC: 33.5 % (ref 32–34.5)
MCV RBC AUTO: 95.3 FL (ref 80–99.9)
METER GLUCOSE: 100 MG/DL (ref 70–110)
METER GLUCOSE: 204 MG/DL (ref 70–110)
METER GLUCOSE: 213 MG/DL (ref 70–110)
PDW BLD-RTO: 13.9 FL (ref 11.5–15)
PHOSPHORUS: 3 MG/DL (ref 2.5–4.5)
PLATELET # BLD: 243 E9/L (ref 130–450)
PMV BLD AUTO: 11.5 FL (ref 7–12)
POTASSIUM SERPL-SCNC: 5.2 MMOL/L (ref 3.5–5)
RBC # BLD: 2.98 E12/L (ref 3.8–5.8)
SODIUM BLD-SCNC: 135 MMOL/L (ref 132–146)
TOTAL PROTEIN: 5.6 G/DL (ref 6.4–8.3)
WBC # BLD: 6.2 E9/L (ref 4.5–11.5)

## 2018-03-08 PROCEDURE — G0257 UNSCHED DIALYSIS ESRD PT HOS: HCPCS

## 2018-03-08 PROCEDURE — 85027 COMPLETE CBC AUTOMATED: CPT

## 2018-03-08 PROCEDURE — 80053 COMPREHEN METABOLIC PANEL: CPT

## 2018-03-08 PROCEDURE — 36415 COLL VENOUS BLD VENIPUNCTURE: CPT

## 2018-03-08 PROCEDURE — 9990 CHARGE CONVERSION

## 2018-03-08 PROCEDURE — 94640 AIRWAY INHALATION TREATMENT: CPT

## 2018-03-08 PROCEDURE — 84100 ASSAY OF PHOSPHORUS: CPT

## 2018-03-08 PROCEDURE — 82962 GLUCOSE BLOOD TEST: CPT

## 2018-03-08 PROCEDURE — 83735 ASSAY OF MAGNESIUM: CPT

## 2018-03-08 RX ORDER — CHOLECALCIFEROL (VITAMIN D3) 10 MCG
1 TABLET ORAL DAILY
Status: DISCONTINUED | OUTPATIENT
Start: 2018-03-08 | End: 2018-03-16 | Stop reason: HOSPADM

## 2018-03-08 RX ADMIN — METOPROLOL SUCCINATE 50 MG: 50 TABLET, FILM COATED, EXTENDED RELEASE ORAL at 22:12

## 2018-03-08 RX ADMIN — INSULIN LISPRO 1 UNITS: 100 INJECTION, SOLUTION INTRAVENOUS; SUBCUTANEOUS at 22:14

## 2018-03-08 RX ADMIN — HEPARIN SODIUM 5000 UNITS: 10000 INJECTION, SOLUTION INTRAVENOUS; SUBCUTANEOUS at 22:14

## 2018-03-08 RX ADMIN — Medication 10 ML: at 22:19

## 2018-03-08 RX ADMIN — BUMETANIDE 2 MG: 1 TABLET ORAL at 22:12

## 2018-03-08 RX ADMIN — HEPARIN SODIUM 5000 UNITS: 10000 INJECTION, SOLUTION INTRAVENOUS; SUBCUTANEOUS at 07:24

## 2018-03-08 RX ADMIN — INSULIN LISPRO 2 UNITS: 100 INJECTION, SOLUTION INTRAVENOUS; SUBCUTANEOUS at 07:58

## 2018-03-08 RX ADMIN — DARBEPOETIN ALFA 60 MCG: 60 INJECTION, SOLUTION INTRAVENOUS; SUBCUTANEOUS at 18:27

## 2018-03-08 ASSESSMENT — PAIN SCALES - GENERAL
PAINLEVEL_OUTOF10: 0

## 2018-03-08 NOTE — PROGRESS NOTES
Department of Internal Medicine  Nephrology Attending Progress Note        SUBJECTIVE:  The patient  Voiding about 3 to 4 times daily, resting in bed but remains edematous family here translating for him  Physical Exam:    Vitals:    03/08/18 0801   BP: (!) 155/87   Pulse: 80   Resp: 16   Temp: 98 °F (36.7 °C)   SpO2: 97%       I/O last 24 hours:  Intake/Output 460/no outputs recorded    Weight:190    General Appearance:  awake, alert, oriented, in no acute distress  Skin:  Skin color, texture, turgor normal. No rashes or lesions. Neck:  neck- supple, no mass, non-tender  Lungs:  Decreased breath sounds rt tunneled catheter  Heart:  Heart regular rate and rhythm     Abdominal: Abdomen soft, non-tender.  BS normal. No masses,  No organomegaly  Extremities: 2+ edema of  bilateral lower extremities   Peripheral Pulses:  decreased    DATA:    CBC with Differential:    Lab Results   Component Value Date    WBC 6.2 03/08/2018    RBC 2.98 03/08/2018    HGB 9.5 03/08/2018    HCT 28.4 03/08/2018     03/08/2018    MCV 95.3 03/08/2018    MCH 31.9 03/08/2018    MCHC 33.5 03/08/2018    RDW 13.9 03/08/2018    LYMPHOPCT 11.1 02/22/2018    MONOPCT 8.8 02/22/2018    BASOPCT 0.0 02/22/2018    MONOSABS 0.57 02/22/2018    LYMPHSABS 0.72 02/22/2018    EOSABS 0.09 02/22/2018    BASOSABS 0.00 02/22/2018     BMP:    Lab Results   Component Value Date     03/05/2018    K 4.6 03/05/2018     03/05/2018    CO2 27 03/05/2018    BUN 28 03/05/2018    LABALBU 2.7 03/03/2018    CREATININE 3.2 03/05/2018    CALCIUM 8.4 03/05/2018    GFRAA 24 03/05/2018    LABGLOM 20 03/05/2018    GLUCOSE 192 03/05/2018            metoprolol succinate  50 mg Oral Nightly    bumetanide  2 mg Oral BID    darbepoetin jesús-polysorbate  60 mcg Subcutaneous Weekly    amLODIPine  5 mg Oral QAM    albuterol-ipratropium  1 puff Inhalation BID    sodium chloride flush  10 mL Intravenous 2 times per day    heparin (porcine)  5,000 Units Subcutaneous 3

## 2018-03-09 LAB
METER GLUCOSE: 135 MG/DL (ref 70–110)
METER GLUCOSE: 193 MG/DL (ref 70–110)
METER GLUCOSE: 207 MG/DL (ref 70–110)
METER GLUCOSE: 94 MG/DL (ref 70–110)

## 2018-03-09 PROCEDURE — 9990 CHARGE CONVERSION

## 2018-03-09 PROCEDURE — 94640 AIRWAY INHALATION TREATMENT: CPT

## 2018-03-09 PROCEDURE — 82962 GLUCOSE BLOOD TEST: CPT

## 2018-03-09 RX ORDER — LISINOPRIL 5 MG/1
5 TABLET ORAL DAILY
Status: DISCONTINUED | OUTPATIENT
Start: 2018-03-09 | End: 2018-03-16 | Stop reason: HOSPADM

## 2018-03-09 RX ORDER — AMLODIPINE BESYLATE 5 MG/1
5 TABLET ORAL NIGHTLY
Status: DISCONTINUED | OUTPATIENT
Start: 2018-03-10 | End: 2018-03-16 | Stop reason: HOSPADM

## 2018-03-09 RX ADMIN — BUMETANIDE 2 MG: 1 TABLET ORAL at 21:03

## 2018-03-09 RX ADMIN — INSULIN LISPRO 2 UNITS: 100 INJECTION, SOLUTION INTRAVENOUS; SUBCUTANEOUS at 11:40

## 2018-03-09 RX ADMIN — METOPROLOL SUCCINATE 50 MG: 50 TABLET, FILM COATED, EXTENDED RELEASE ORAL at 21:03

## 2018-03-09 RX ADMIN — AMLODIPINE BESYLATE 5 MG: 5 TABLET ORAL at 07:55

## 2018-03-09 RX ADMIN — HEPARIN SODIUM 5000 UNITS: 10000 INJECTION, SOLUTION INTRAVENOUS; SUBCUTANEOUS at 14:12

## 2018-03-09 RX ADMIN — NEPHROCAP 1 MG: 1 CAP ORAL at 07:55

## 2018-03-09 RX ADMIN — BUMETANIDE 2 MG: 1 TABLET ORAL at 07:55

## 2018-03-09 RX ADMIN — LISINOPRIL 5 MG: 5 TABLET ORAL at 14:18

## 2018-03-09 RX ADMIN — HEPARIN SODIUM 5000 UNITS: 10000 INJECTION, SOLUTION INTRAVENOUS; SUBCUTANEOUS at 06:17

## 2018-03-09 RX ADMIN — HEPARIN SODIUM 5000 UNITS: 10000 INJECTION, SOLUTION INTRAVENOUS; SUBCUTANEOUS at 21:05

## 2018-03-09 RX ADMIN — INSULIN LISPRO 1 UNITS: 100 INJECTION, SOLUTION INTRAVENOUS; SUBCUTANEOUS at 21:02

## 2018-03-09 ASSESSMENT — PAIN SCALES - GENERAL
PAINLEVEL_OUTOF10: 0

## 2018-03-09 NOTE — PATIENT CARE CONFERENCE
[facility] Quality Flow/Interdisciplinary Rounds Progress Note        Quality Flow Rounds held on March 9, 2018    Disciplines Attending:  Bedside Nurse, ,  and Nursing Unit Leadership    Shira Liz was admitted on 2/21/2018  6:19 PM    Anticipated Discharge Date:  Expected Discharge Date: 02/24/18    Disposition:    Walker Score:  Walker Scale Score: 20    Readmission Risk              Readmission Risk:        20.75       Age 72 or Greater:  1    Admitted from SNF or Requires Paid or Family Care:  0    Currently has CHF,COPD,ARF,CRI,or is on dialysis:  0    Takes more than 5 Prescription Medications:  4    Takes Digoxin,Insulin,Anticoagulants,Narcotics or ASA/Plavix:  [facility] in Past 12 Months:  10    On Disability:  0    Patient Considers own Health:  3.75          Discussed patient goal for the day, patient clinical progression, and barriers to discharge.   The following Goal(s) of the Day/Commitment(s) have been identified:  Diagnostics - Report Results      Kavon Blanco  March 9, 2018

## 2018-03-09 NOTE — PROGRESS NOTES
03/08/18   0956   NA  135   K  5.2*   CL  101   CO2  26   BUN  27*   CREATININE  4.0*   GLUCOSE  131*   CALCIUM  8.5*       Recent Labs      03/08/18   0956   WBC  6.2   RBC  2.98*   HGB  9.5*   HCT  28.4*   MCV  95.3   MCH  31.9   MCHC  33.5   RDW  13.9   PLT  243   MPV  11.5       Labs and images reviewed     Radiology:   US DVT LOWER BILATERAL COMPLETE   Final Result   Patent deep venous system of the Bilateral lower   extremity. No evidence for DVT. CT Chest WO Contrast   Final Result   1. Bilateral pleural effusions, left greater than right. 2. Small pericardial effusion. NM LUNG VENT/PERFUSION (VQ)   Final Result   Findings are consistent with a low probability of   pulmonary embolism. This result should be interpreted in conjunction with the clinical   pretest probability for pulmonary embolism. XR CHEST PORTABLE   Final Result   1. Stable, enlarged cardiomediastinal silhouette. .   2. Bibasilar infiltrate/pneumonia versus atelectasis versus a   combination of both with left pleural effusion. 3. Suspected underlying mild central pulmonary vascular congestion. 4. Right-sided central line as above. Assessment:    Principal Problem:    Fluid overload  Active Problems:    Diabetes mellitus ([facility] [address]    Essential hypertension    ESRD on dialysis ([facility] [address]    Anemia  Resolved Problems:    * No resolved hospital problems. *      Plan:  Dyspnea : due to Fluid overload from missing dialysis. managed by renal with  dialysis.  VQ scan with  Low prob   PE, CT of the chest showed moderate left sided pleural effusion, no signs of pneumonia. Now resolved and is on Ra.     ESRD on HD :  renal managing dialysis ,and needs out pt HD arranged.     Hypertension: on  amlodipine.  Monitor .     Anemia of chronic disease-on  Aranesp ,s/p 1 U PRBcs ,monitor.     DMI - on ISS ,monitor.     Deconditioning: Improving with improving vol status       aspergillus sputum culture: ID signed off 3/2/18 - states no need to repeat cultures or treat Aspergillus colonization of the airways.     On sc heparin for dvt prophy     Full code.       Due to technical reason not was not accepted yesterday     Electronically signed by Fay Villanueva MD on 3/9/2018 at 9:21 AM

## 2018-03-09 NOTE — CARE COORDINATION
Social Work discharge planning:  Per Shefali Ngo with [facility] outpt dialysis at this time, their acceptance of pt is still pending. Shefali Ngo said he or Bryant Damon from [employer] office will call this SW by 4p OR the RN Unit [phone] after 4p today IF IF accepted. Otherwise, will try again Monday.   Electronically signed by Isidro Jacome on 3/9/2018 at 3:25 PM

## 2018-03-10 LAB
HCT VFR BLD CALC: 29.8 % (ref 37–54)
HEMOGLOBIN: 9.8 G/DL (ref 12.5–16.5)
MCH RBC QN AUTO: 31.4 PG (ref 26–35)
MCHC RBC AUTO-ENTMCNC: 32.9 % (ref 32–34.5)
MCV RBC AUTO: 95.5 FL (ref 80–99.9)
METER GLUCOSE: 137 MG/DL (ref 70–110)
METER GLUCOSE: 176 MG/DL (ref 70–110)
METER GLUCOSE: 223 MG/DL (ref 70–110)
METER GLUCOSE: 238 MG/DL (ref 70–110)
PDW BLD-RTO: 14 FL (ref 11.5–15)
PHOSPHORUS: 3.8 MG/DL (ref 2.5–4.5)
PLATELET # BLD: 249 E9/L (ref 130–450)
PMV BLD AUTO: 11.4 FL (ref 7–12)
RBC # BLD: 3.12 E12/L (ref 3.8–5.8)
WBC # BLD: 5.7 E9/L (ref 4.5–11.5)

## 2018-03-10 PROCEDURE — 94640 AIRWAY INHALATION TREATMENT: CPT

## 2018-03-10 PROCEDURE — 6370000000 HC RX 637 (ALT 250 FOR IP): Performed by: INTERNAL MEDICINE

## 2018-03-10 PROCEDURE — 2580000003 HC RX 258: Performed by: INTERNAL MEDICINE

## 2018-03-10 PROCEDURE — 82962 GLUCOSE BLOOD TEST: CPT

## 2018-03-10 PROCEDURE — 6360000002 HC RX W HCPCS: Performed by: INTERNAL MEDICINE

## 2018-03-10 PROCEDURE — 90935 HEMODIALYSIS ONE EVALUATION: CPT | Performed by: INTERNAL MEDICINE

## 2018-03-10 PROCEDURE — 1200000000 HC SEMI PRIVATE

## 2018-03-10 RX ADMIN — INSULIN LISPRO 1 UNITS: 100 INJECTION, SOLUTION INTRAVENOUS; SUBCUTANEOUS at 14:55

## 2018-03-10 RX ADMIN — NEPHROCAP 1 MG: 1 CAP ORAL at 09:07

## 2018-03-10 RX ADMIN — INSULIN LISPRO 1 UNITS: 100 INJECTION, SOLUTION INTRAVENOUS; SUBCUTANEOUS at 22:10

## 2018-03-10 RX ADMIN — HEPARIN SODIUM 5000 UNITS: 10000 INJECTION, SOLUTION INTRAVENOUS; SUBCUTANEOUS at 16:24

## 2018-03-10 RX ADMIN — HEPARIN SODIUM 5000 UNITS: 10000 INJECTION, SOLUTION INTRAVENOUS; SUBCUTANEOUS at 22:11

## 2018-03-10 RX ADMIN — LISINOPRIL 5 MG: 5 TABLET ORAL at 14:55

## 2018-03-10 RX ADMIN — HEPARIN SODIUM 5000 UNITS: 10000 INJECTION, SOLUTION INTRAVENOUS; SUBCUTANEOUS at 06:29

## 2018-03-10 RX ADMIN — METOPROLOL SUCCINATE 50 MG: 50 TABLET, FILM COATED, EXTENDED RELEASE ORAL at 22:06

## 2018-03-10 RX ADMIN — INSULIN LISPRO 2 UNITS: 100 INJECTION, SOLUTION INTRAVENOUS; SUBCUTANEOUS at 17:22

## 2018-03-10 RX ADMIN — AMLODIPINE BESYLATE 5 MG: 5 TABLET ORAL at 22:06

## 2018-03-10 ASSESSMENT — PAIN SCALES - GENERAL
PAINLEVEL_OUTOF10: 0

## 2018-03-11 LAB
METER GLUCOSE: 154 MG/DL (ref 70–110)
METER GLUCOSE: 154 MG/DL (ref 70–110)
METER GLUCOSE: 179 MG/DL (ref 70–110)
METER GLUCOSE: 195 MG/DL (ref 70–110)
METER GLUCOSE: 251 MG/DL (ref 70–110)

## 2018-03-11 PROCEDURE — 1200000000 HC SEMI PRIVATE

## 2018-03-11 PROCEDURE — 82962 GLUCOSE BLOOD TEST: CPT

## 2018-03-11 PROCEDURE — 6370000000 HC RX 637 (ALT 250 FOR IP): Performed by: INTERNAL MEDICINE

## 2018-03-11 PROCEDURE — 94640 AIRWAY INHALATION TREATMENT: CPT

## 2018-03-11 PROCEDURE — 6360000002 HC RX W HCPCS: Performed by: INTERNAL MEDICINE

## 2018-03-11 RX ORDER — HYDROCODONE BITARTRATE AND ACETAMINOPHEN 5; 325 MG/1; MG/1
1 TABLET ORAL EVERY 6 HOURS PRN
Status: DISCONTINUED | OUTPATIENT
Start: 2018-03-11 | End: 2018-03-11

## 2018-03-11 RX ADMIN — BUMETANIDE 2 MG: 1 TABLET ORAL at 21:58

## 2018-03-11 RX ADMIN — INSULIN LISPRO 3 UNITS: 100 INJECTION, SOLUTION INTRAVENOUS; SUBCUTANEOUS at 17:23

## 2018-03-11 RX ADMIN — BUMETANIDE 2 MG: 1 TABLET ORAL at 09:00

## 2018-03-11 RX ADMIN — METOPROLOL SUCCINATE 50 MG: 50 TABLET, FILM COATED, EXTENDED RELEASE ORAL at 21:58

## 2018-03-11 RX ADMIN — NEPHROCAP 1 MG: 1 CAP ORAL at 09:00

## 2018-03-11 RX ADMIN — INSULIN LISPRO 1 UNITS: 100 INJECTION, SOLUTION INTRAVENOUS; SUBCUTANEOUS at 22:06

## 2018-03-11 RX ADMIN — HEPARIN SODIUM 5000 UNITS: 10000 INJECTION, SOLUTION INTRAVENOUS; SUBCUTANEOUS at 14:21

## 2018-03-11 RX ADMIN — HEPARIN SODIUM 5000 UNITS: 10000 INJECTION, SOLUTION INTRAVENOUS; SUBCUTANEOUS at 06:53

## 2018-03-11 RX ADMIN — LISINOPRIL 5 MG: 5 TABLET ORAL at 09:00

## 2018-03-11 RX ADMIN — INSULIN LISPRO 1 UNITS: 100 INJECTION, SOLUTION INTRAVENOUS; SUBCUTANEOUS at 09:03

## 2018-03-11 RX ADMIN — INSULIN LISPRO 1 UNITS: 100 INJECTION, SOLUTION INTRAVENOUS; SUBCUTANEOUS at 12:02

## 2018-03-11 RX ADMIN — AMLODIPINE BESYLATE 5 MG: 5 TABLET ORAL at 21:58

## 2018-03-11 RX ADMIN — HEPARIN SODIUM 5000 UNITS: 10000 INJECTION, SOLUTION INTRAVENOUS; SUBCUTANEOUS at 22:07

## 2018-03-11 ASSESSMENT — PAIN SCALES - GENERAL
PAINLEVEL_OUTOF10: 0

## 2018-03-11 NOTE — PROGRESS NOTES
Progress Note  3/11/2018 7:27 PM  Subjective:   Admit Date: 2/21/2018  PCP: No primary care provider on file. Interval History: Patient examined doing well edema present     Diet: DIET RENAL; Carb Control: 4 carbs/meal (approximate 1800 kcals/day)    Data:   Scheduled Meds:   lisinopril  5 mg Oral Daily    amLODIPine  5 mg Oral Nightly    b complex-C-folic acid  1 capsule Oral Daily    metoprolol succinate  50 mg Oral Nightly    bumetanide  2 mg Oral BID    darbepoetin jesús-polysorbate  60 mcg Subcutaneous Weekly    albuterol-ipratropium  1 puff Inhalation BID    sodium chloride flush  10 mL Intravenous 2 times per day    heparin (porcine)  5,000 Units Subcutaneous 3 times per day    insulin lispro  0-6 Units Subcutaneous TID WC    insulin lispro  0-3 Units Subcutaneous Nightly     Continuous Infusions:   dextrose       PRN Meds:heparin (porcine), sodium chloride flush, acetaminophen, magnesium hydroxide, ondansetron, glucose, dextrose, glucagon (rDNA), dextrose  I/O last 3 completed shifts: In: 300 [P.O.:300]  Out: -   No intake/output data recorded. Intake/Output Summary (Last 24 hours) at 03/11/18 1927  Last data filed at 03/11/18 3195   Gross per 24 hour   Intake              300 ml   Output                0 ml   Net              300 ml     CBC:   Recent Labs      03/10/18   0922   WBC  5.7   HGB  9.8*   PLT  249     BMP:  No results for input(s): NA, K, CL, CO2, BUN, CREATININE, GLUCOSE in the last 72 hours. Hepatic: No results for input(s): AST, ALT, ALB, BILITOT, ALKPHOS in the last 72 hours. Troponin: No results for input(s): TROPONINI in the last 72 hours. BNP: No results for input(s): BNP in the last 72 hours. Lipids: No results for input(s): CHOL, HDL in the last 72 hours. Invalid input(s): LDLCALCU  ABGs: No results found for: PHART, PO2ART, JOY6OBT  INR: No results for input(s): INR in the last 72 hours.     -----------------------------------------------------------------  RAD: Ct Chest Wo Contrast    Result Date: 2/21/2018  EXAM: CT CHEST WO CONTRAST INDICATION:  hemoptysis  COMPARISON: October 17, 2016 TECHNIQUE: Axial CT images of the chest were performed without contrast Reformatted images in the coronal planes and sagittal planes provided. AEC utilized for dose reduction technique. FINDINGS: THORACIC AORTA:  The thoracic aorta is normal.  No aneurysm is noted. PULMONARY ARTERY:  The pulmonary artery is not enlarged and is normal in contour. MEDIASTINUM:  No size-significant lymphadenopathy. Small pericardial effusion noted. The heart is otherwise unremarkable. No pericardial effusion. LUNG PARENCHYMA:  There are no lung masses or suspicious nodules noted. Bilateral pleural effusions noted, left greater than right there is accompanying atelectasis. [place] No pneumothorax. SUPRACLAVICULAR SPACE AND AXILLA:  No significant lymphadenopathy. UPPER ABDOMEN:  Grossly unremarkable. OSSEOUS STRUCTURES:  Grossly unremarkable. 1. Bilateral pleural effusions, left greater than right. 2. Small pericardial effusion. Nm Lung Vent/perfusion (vq)    Result Date: 2/21/2018  Clinical history:  hemoptysis; shortness of breath Agents:  10.8 mCi Xe-133 gas inhaled             7.6 mCi technetium-99m MAA I.V. Procedure: Images of lung ventilation were obtained in three phases. Perfusion lung images were then obtained in multiple views. Findings: After rebreathing radioXenon to equilibrium, activity is evenly distributed throughout the lungs. In the washout phase, the gas is cleared from the lungs without regional retention. The perfusion images demonstrate a slightly inhomogeneous distribution of activity throughout both lungs. No segmental or significant subsegmental perfusion defects are identified. Findings are consistent with a low probability of pulmonary embolism. This result should be interpreted in conjunction with the clinical pretest probability for pulmonary embolism.     Xr Chest

## 2018-03-11 NOTE — PROGRESS NOTES
[facility] Hospitalist Progress Note    Admitting Date and Time: 2/21/2018  6:19 PM  Admit Dx: ESRD ON DIALYSIS    Subjective:    Denies CP ,sob or abdominal pain. ROS: denies fever, chills, cp, sob, n/v, HA unless stated above.      lisinopril  5 mg Oral Daily    amLODIPine  5 mg Oral Nightly    b complex-C-folic acid  1 capsule Oral Daily    metoprolol succinate  50 mg Oral Nightly    bumetanide  2 mg Oral BID    darbepoetin jesús-polysorbate  60 mcg Subcutaneous Weekly    albuterol-ipratropium  1 puff Inhalation BID    sodium chloride flush  10 mL Intravenous 2 times per day    heparin (porcine)  5,000 Units Subcutaneous 3 times per day    insulin lispro  0-6 Units Subcutaneous TID WC    insulin lispro  0-3 Units Subcutaneous Nightly       heparin (porcine) 3,200 Units PRN   sodium chloride flush 10 mL PRN   acetaminophen 650 mg Q4H PRN   magnesium hydroxide 30 mL Daily PRN   ondansetron 4 mg Q6H PRN   glucose 15 g PRN   dextrose 12.5 g PRN   glucagon (rDNA) 1 mg PRN   dextrose 100 mL/hr PRN        Objective:    /79   Pulse 80   Temp 99 °F (37.2 °C) (Oral)   Resp 18   Ht 5' 3\" (1.6 m)   Wt 184 lb 11.9 oz (83.8 kg)   SpO2 97%   BMI 32.73 kg/m²   General Appearance: alert and oriented to person, place and time, well developed and well- nourished, in no acute distress  Skin: warm and dry, no rash or erythema  Head: normocephalic and atraumatic  Eyes: pupils equal, round, and reactive to light, extraocular eye movements intact, conjunctivae normal  Neck: supple and non-tender without mass  Pulmonary/Chest: clear to auscultation bilaterally  Cardiovascular: normal rate, regular rhythm, normal S1 and S2  Abdomen: soft, non-tender, non-distended, normal bowel sounds, no masses or organomegaly  Extremities: no cyanosis, clubbing or edema  Musculoskeletal: normal range of motion, no joint swelling, deformity or tenderness  Neurologic: reflexes normal and symmetric, no cranial nerve deficit, speech ,monitor.     Deconditioning: Improving with improving vol status       aspergillus sputum culture: ID signed off 3/2/18 - states no need to repeat cultures or treat Aspergillus colonization of the airways.     On sc heparin for dvt prophy     Full code.         Electronically signed by Harlan Pickett MD on 3/10/2018 at 7:43 PM

## 2018-03-12 LAB
METER GLUCOSE: 136 MG/DL (ref 70–110)
METER GLUCOSE: 195 MG/DL (ref 70–110)
METER GLUCOSE: 212 MG/DL (ref 70–110)
METER GLUCOSE: 259 MG/DL (ref 70–110)

## 2018-03-12 PROCEDURE — 6370000000 HC RX 637 (ALT 250 FOR IP): Performed by: INTERNAL MEDICINE

## 2018-03-12 PROCEDURE — 94640 AIRWAY INHALATION TREATMENT: CPT

## 2018-03-12 PROCEDURE — 6360000002 HC RX W HCPCS: Performed by: INTERNAL MEDICINE

## 2018-03-12 PROCEDURE — 82962 GLUCOSE BLOOD TEST: CPT

## 2018-03-12 PROCEDURE — 2580000003 HC RX 258: Performed by: INTERNAL MEDICINE

## 2018-03-12 PROCEDURE — 1200000000 HC SEMI PRIVATE

## 2018-03-12 RX ADMIN — METOPROLOL SUCCINATE 50 MG: 50 TABLET, FILM COATED, EXTENDED RELEASE ORAL at 21:57

## 2018-03-12 RX ADMIN — HEPARIN SODIUM 5000 UNITS: 10000 INJECTION, SOLUTION INTRAVENOUS; SUBCUTANEOUS at 06:52

## 2018-03-12 RX ADMIN — HEPARIN SODIUM 5000 UNITS: 10000 INJECTION, SOLUTION INTRAVENOUS; SUBCUTANEOUS at 16:42

## 2018-03-12 RX ADMIN — LISINOPRIL 5 MG: 5 TABLET ORAL at 10:31

## 2018-03-12 RX ADMIN — HEPARIN SODIUM 5000 UNITS: 10000 INJECTION, SOLUTION INTRAVENOUS; SUBCUTANEOUS at 23:10

## 2018-03-12 RX ADMIN — INSULIN LISPRO 2 UNITS: 100 INJECTION, SOLUTION INTRAVENOUS; SUBCUTANEOUS at 21:57

## 2018-03-12 RX ADMIN — NEPHROCAP 1 MG: 1 CAP ORAL at 10:31

## 2018-03-12 RX ADMIN — Medication 10 ML: at 10:32

## 2018-03-12 RX ADMIN — BUMETANIDE 2 MG: 1 TABLET ORAL at 21:57

## 2018-03-12 RX ADMIN — INSULIN LISPRO 2 UNITS: 100 INJECTION, SOLUTION INTRAVENOUS; SUBCUTANEOUS at 12:12

## 2018-03-12 RX ADMIN — INSULIN LISPRO 1 UNITS: 100 INJECTION, SOLUTION INTRAVENOUS; SUBCUTANEOUS at 16:42

## 2018-03-12 RX ADMIN — BUMETANIDE 2 MG: 1 TABLET ORAL at 10:31

## 2018-03-12 RX ADMIN — AMLODIPINE BESYLATE 5 MG: 5 TABLET ORAL at 21:56

## 2018-03-12 ASSESSMENT — PAIN SCALES - GENERAL: PAINLEVEL_OUTOF10: 0

## 2018-03-12 NOTE — PROGRESS NOTES
[facility] Hospitalist Progress Note    Admitting Date and Time: 2/21/2018  6:19 PM  Admit Dx: ESRD ON DIALYSIS    Subjective: The patient was seen and examined while in bed. No complaints expressed.      lisinopril  5 mg Oral Daily    amLODIPine  5 mg Oral Nightly    b complex-C-folic acid  1 capsule Oral Daily    metoprolol succinate  50 mg Oral Nightly    bumetanide  2 mg Oral BID    darbepoetin jesús-polysorbate  60 mcg Subcutaneous Weekly    albuterol-ipratropium  1 puff Inhalation BID    sodium chloride flush  10 mL Intravenous 2 times per day    heparin (porcine)  5,000 Units Subcutaneous 3 times per day    insulin lispro  0-6 Units Subcutaneous TID WC    insulin lispro  0-3 Units Subcutaneous Nightly       heparin (porcine) 3,200 Units PRN   sodium chloride flush 10 mL PRN   acetaminophen 650 mg Q4H PRN   magnesium hydroxide 30 mL Daily PRN   ondansetron 4 mg Q6H PRN   glucose 15 g PRN   dextrose 12.5 g PRN   glucagon (rDNA) 1 mg PRN   dextrose 100 mL/hr PRN        Objective:    BP (!) 148/84   Pulse 73   Temp 98.9 °F (37.2 °C) (Oral)   Resp 16   Ht 5' 3\" (1.6 m)   Wt 192 lb 9.6 oz (87.4 kg)   SpO2 98%   BMI 34.12 kg/m²   General Appearance: alert and oriented to person, place and time  Skin: warm and dry, no rash or erythema  Head: normocephalic and atraumatic  Eyes: extraocular eye movements intact  Neck: neck supple and non tender without mass, no thyromegaly or thyroid nodules, no cervical lymphadenopathy   Pulmonary/Chest: clear to auscultation bilaterally- no wheezes, rales or rhonchi, normal air movement, no respiratory distress  Cardiovascular: normal rate and normal S1 and S2  Abdomen: soft, non-tender, non-distended, normal bowel sounds, no masses or organomegaly  Extremities: no cyanosis and no clubbing  Musculoskeletal: normal range of motion, no joint swelling, deformity or tenderness      No results for input(s): NA, K, CL, CO2, BUN, CREATININE, GLUCOSE, CALCIUM in the last 72 NM LUNG VENT/PERFUSION (VQ)   Final Result   Findings are consistent with a low probability of   pulmonary embolism. This result should be interpreted in conjunction with the clinical   pretest probability for pulmonary embolism. XR CHEST PORTABLE   Final Result   1. Stable, enlarged cardiomediastinal silhouette. .   2. Bibasilar infiltrate/pneumonia versus atelectasis versus a   combination of both with left pleural effusion. 3. Suspected underlying mild central pulmonary vascular congestion. 4. Right-sided central line as above. Assessment:    Principal Problem:    Fluid overload  Active Problems:    Diabetes mellitus ([address]    Essential hypertension    ESRD on dialysis ([facility] [address]    Anemia  Resolved Problems:    * No resolved hospital problems. *      Plan:    Dyspnea :       due to Fluid overload from missing dialysis. was  managed      by renal with  dialysis.        VQ scan with  Low prob   PE, CT of the chest showed      moderate left sided pleural effusion, no signs of      pneumonia. Now resolved and is on Ra.     ESRD on HD :         renal managing dialysis ,       and needs out pt HD arranged.     Hypertension:       on  amlodipine.          Monitor      Anemia of chronic disease-on  Aranesp:       s/p 1 U PRBcs       monitor.     DMI:      on ISS       monitor.     Deconditioning: Improving with improving vol status       aspergillus sputum culture:        ID signed off 3/2/18 - states no need to repeat cultures or       treat Aspergillus colonization of the airways.      dvt prophy:      On heparin     Full code.         Electronically signed by Odella Dakins, MD on 3/12/2018 at 11:27 AM

## 2018-03-13 LAB
ANION GAP SERPL CALCULATED.3IONS-SCNC: 14 MMOL/L (ref 7–16)
BUN BLDV-MCNC: 36 MG/DL (ref 8–23)
CALCIUM SERPL-MCNC: 8.6 MG/DL (ref 8.6–10.2)
CHLORIDE BLD-SCNC: 99 MMOL/L (ref 98–107)
CO2: 24 MMOL/L (ref 22–29)
CREAT SERPL-MCNC: 4.6 MG/DL (ref 0.7–1.2)
GFR AFRICAN AMERICAN: 16
GFR NON-AFRICAN AMERICAN: 13 ML/MIN/1.73
GLUCOSE BLD-MCNC: 116 MG/DL (ref 74–109)
METER GLUCOSE: 153 MG/DL (ref 70–110)
METER GLUCOSE: 192 MG/DL (ref 70–110)
POTASSIUM SERPL-SCNC: 5 MMOL/L (ref 3.5–5)
SODIUM BLD-SCNC: 137 MMOL/L (ref 132–146)

## 2018-03-13 PROCEDURE — 36415 COLL VENOUS BLD VENIPUNCTURE: CPT

## 2018-03-13 PROCEDURE — 2580000003 HC RX 258: Performed by: INTERNAL MEDICINE

## 2018-03-13 PROCEDURE — 90935 HEMODIALYSIS ONE EVALUATION: CPT | Performed by: INTERNAL MEDICINE

## 2018-03-13 PROCEDURE — 80048 BASIC METABOLIC PNL TOTAL CA: CPT

## 2018-03-13 PROCEDURE — 6370000000 HC RX 637 (ALT 250 FOR IP): Performed by: INTERNAL MEDICINE

## 2018-03-13 PROCEDURE — 82962 GLUCOSE BLOOD TEST: CPT

## 2018-03-13 PROCEDURE — 1200000000 HC SEMI PRIVATE

## 2018-03-13 PROCEDURE — 6360000002 HC RX W HCPCS: Performed by: INTERNAL MEDICINE

## 2018-03-13 PROCEDURE — 94640 AIRWAY INHALATION TREATMENT: CPT

## 2018-03-13 RX ADMIN — HEPARIN SODIUM 5000 UNITS: 10000 INJECTION, SOLUTION INTRAVENOUS; SUBCUTANEOUS at 22:31

## 2018-03-13 RX ADMIN — HEPARIN SODIUM 5000 UNITS: 10000 INJECTION, SOLUTION INTRAVENOUS; SUBCUTANEOUS at 13:48

## 2018-03-13 RX ADMIN — INSULIN LISPRO 1 UNITS: 100 INJECTION, SOLUTION INTRAVENOUS; SUBCUTANEOUS at 08:46

## 2018-03-13 RX ADMIN — INSULIN LISPRO 1 UNITS: 100 INJECTION, SOLUTION INTRAVENOUS; SUBCUTANEOUS at 22:30

## 2018-03-13 RX ADMIN — LISINOPRIL 5 MG: 5 TABLET ORAL at 08:45

## 2018-03-13 RX ADMIN — HEPARIN SODIUM 5000 UNITS: 10000 INJECTION, SOLUTION INTRAVENOUS; SUBCUTANEOUS at 06:18

## 2018-03-13 RX ADMIN — BUMETANIDE 2 MG: 1 TABLET ORAL at 08:45

## 2018-03-13 RX ADMIN — AMLODIPINE BESYLATE 5 MG: 5 TABLET ORAL at 22:30

## 2018-03-13 RX ADMIN — METOPROLOL SUCCINATE 50 MG: 50 TABLET, FILM COATED, EXTENDED RELEASE ORAL at 22:30

## 2018-03-13 RX ADMIN — INSULIN LISPRO 1 UNITS: 100 INJECTION, SOLUTION INTRAVENOUS; SUBCUTANEOUS at 11:42

## 2018-03-13 RX ADMIN — NEPHROCAP 1 MG: 1 CAP ORAL at 08:45

## 2018-03-13 ASSESSMENT — PAIN SCALES - GENERAL
PAINLEVEL_OUTOF10: 0

## 2018-03-13 NOTE — PROGRESS NOTES
Final Result   Findings are consistent with a low probability of   pulmonary embolism. This result should be interpreted in conjunction with the clinical   pretest probability for pulmonary embolism. XR CHEST PORTABLE   Final Result   1. Stable, enlarged cardiomediastinal silhouette. .   2. Bibasilar infiltrate/pneumonia versus atelectasis versus a   combination of both with left pleural effusion. 3. Suspected underlying mild central pulmonary vascular congestion. 4. Right-sided central line as above. Assessment:    Principal Problem:    Fluid overload  Active Problems:    Diabetes mellitus ([address]    Essential hypertension    ESRD on dialysis ([facility] [address]    Anemia  Resolved Problems:    * No resolved hospital problems. *      Plan:       ESRD on HD :         renal managing dialysis ,       and needs out pt HD arranged.     Hypertension:       on  amlodipine.          Monitor      Anemia of chronic disease-on  Aranesp:       s/p 1 U PRBcs       monitor.     DMI:      on ISS       monitor.     Deconditioning:      Improving with improving vol status       aspergillus sputum culture:        ID signed off 3/2/18 - states no need to repeat cultures or       treat Aspergillus colonization of the airways.      dvt prophy:      On heparin     Full code.      Awaiting authorization for outpatient dialysis      Electronically signed by Nora Seals MD on 3/13/2018 at 11:38 AM

## 2018-03-14 LAB
ALBUMIN SERPL-MCNC: 3.2 G/DL (ref 3.5–5.2)
ALP BLD-CCNC: 102 U/L (ref 40–129)
ALT SERPL-CCNC: 19 U/L (ref 0–40)
ANION GAP SERPL CALCULATED.3IONS-SCNC: 9 MMOL/L (ref 7–16)
AST SERPL-CCNC: 20 U/L (ref 0–39)
BILIRUB SERPL-MCNC: 0.4 MG/DL (ref 0–1.2)
BUN BLDV-MCNC: 27 MG/DL (ref 8–23)
CALCIUM SERPL-MCNC: 8.5 MG/DL (ref 8.6–10.2)
CHLORIDE BLD-SCNC: 103 MMOL/L (ref 98–107)
CO2: 26 MMOL/L (ref 22–29)
CREAT SERPL-MCNC: 3.7 MG/DL (ref 0.7–1.2)
GFR AFRICAN AMERICAN: 20
GFR NON-AFRICAN AMERICAN: 17 ML/MIN/1.73
GLUCOSE BLD-MCNC: 209 MG/DL (ref 74–109)
HCT VFR BLD CALC: 31.5 % (ref 37–54)
HEMOGLOBIN: 10.5 G/DL (ref 12.5–16.5)
MAGNESIUM: 2.1 MG/DL (ref 1.6–2.6)
MCH RBC QN AUTO: 32.2 PG (ref 26–35)
MCHC RBC AUTO-ENTMCNC: 33.3 % (ref 32–34.5)
MCV RBC AUTO: 96.6 FL (ref 80–99.9)
METER GLUCOSE: 107 MG/DL (ref 70–110)
METER GLUCOSE: 176 MG/DL (ref 70–110)
METER GLUCOSE: 210 MG/DL (ref 70–110)
METER GLUCOSE: 212 MG/DL (ref 70–110)
METER GLUCOSE: 229 MG/DL (ref 70–110)
PDW BLD-RTO: 14.6 FL (ref 11.5–15)
PHOSPHORUS: 3.8 MG/DL (ref 2.5–4.5)
PLATELET # BLD: 233 E9/L (ref 130–450)
PMV BLD AUTO: 11.6 FL (ref 7–12)
POTASSIUM SERPL-SCNC: 5 MMOL/L (ref 3.5–5)
RBC # BLD: 3.26 E12/L (ref 3.8–5.8)
SODIUM BLD-SCNC: 138 MMOL/L (ref 132–146)
TOTAL PROTEIN: 6 G/DL (ref 6.4–8.3)
WBC # BLD: 6.1 E9/L (ref 4.5–11.5)

## 2018-03-14 PROCEDURE — 6370000000 HC RX 637 (ALT 250 FOR IP): Performed by: INTERNAL MEDICINE

## 2018-03-14 PROCEDURE — 36415 COLL VENOUS BLD VENIPUNCTURE: CPT

## 2018-03-14 PROCEDURE — 94640 AIRWAY INHALATION TREATMENT: CPT

## 2018-03-14 PROCEDURE — 83735 ASSAY OF MAGNESIUM: CPT

## 2018-03-14 PROCEDURE — 6360000002 HC RX W HCPCS: Performed by: INTERNAL MEDICINE

## 2018-03-14 PROCEDURE — 80053 COMPREHEN METABOLIC PANEL: CPT

## 2018-03-14 PROCEDURE — 84100 ASSAY OF PHOSPHORUS: CPT

## 2018-03-14 PROCEDURE — 82962 GLUCOSE BLOOD TEST: CPT

## 2018-03-14 PROCEDURE — 1200000000 HC SEMI PRIVATE

## 2018-03-14 PROCEDURE — 85027 COMPLETE CBC AUTOMATED: CPT

## 2018-03-14 RX ADMIN — HEPARIN SODIUM 5000 UNITS: 10000 INJECTION, SOLUTION INTRAVENOUS; SUBCUTANEOUS at 14:08

## 2018-03-14 RX ADMIN — BUMETANIDE 2 MG: 1 TABLET ORAL at 20:39

## 2018-03-14 RX ADMIN — NEPHROCAP 1 MG: 1 CAP ORAL at 08:51

## 2018-03-14 RX ADMIN — BUMETANIDE 2 MG: 1 TABLET ORAL at 08:51

## 2018-03-14 RX ADMIN — HEPARIN SODIUM 5000 UNITS: 10000 INJECTION, SOLUTION INTRAVENOUS; SUBCUTANEOUS at 06:53

## 2018-03-14 RX ADMIN — INSULIN LISPRO 1 UNITS: 100 INJECTION, SOLUTION INTRAVENOUS; SUBCUTANEOUS at 20:39

## 2018-03-14 RX ADMIN — METOPROLOL SUCCINATE 50 MG: 50 TABLET, FILM COATED, EXTENDED RELEASE ORAL at 20:39

## 2018-03-14 RX ADMIN — HEPARIN SODIUM 5000 UNITS: 10000 INJECTION, SOLUTION INTRAVENOUS; SUBCUTANEOUS at 20:40

## 2018-03-14 RX ADMIN — AMLODIPINE BESYLATE 5 MG: 5 TABLET ORAL at 20:39

## 2018-03-14 RX ADMIN — LISINOPRIL 5 MG: 5 TABLET ORAL at 08:51

## 2018-03-14 RX ADMIN — INSULIN LISPRO 2 UNITS: 100 INJECTION, SOLUTION INTRAVENOUS; SUBCUTANEOUS at 16:54

## 2018-03-14 RX ADMIN — INSULIN LISPRO 2 UNITS: 100 INJECTION, SOLUTION INTRAVENOUS; SUBCUTANEOUS at 12:27

## 2018-03-14 ASSESSMENT — PAIN SCALES - GENERAL
PAINLEVEL_OUTOF10: 0
PAINLEVEL_OUTOF10: 0

## 2018-03-14 NOTE — PROGRESS NOTES
 insulin lispro  0-6 Units Subcutaneous TID WC    insulin lispro  0-3 Units Subcutaneous Nightly      dextrose       heparin (porcine), sodium chloride flush, acetaminophen, magnesium hydroxide, ondansetron, glucose, dextrose, glucagon (rDNA), dextrose    IMPRESSION/RECOMMENDATIONS:      ESRD for dialysis tomorrow,  outpt arrangements will be for the pt to be self pay as he is not eligible for Medicaid-talked to the pt son and the cost per treatment is $450.00-pt son is going to talk to his brothers re: the financial obligation    Anemia of CKD-HgB  At goal 10-11- on rowena    HTN with CKD 5/ESRD -BP today at goal <140/90-no new change    Secondary hyperparathyroidism of renal origin- binders on hold-PO4 WNL          Rl Perry MD  3/14/2018 5:29 PM

## 2018-03-14 NOTE — PROGRESS NOTES
[facility] Hospitalist Progress Note    Admitting Date and Time: 2/21/2018  6:19 PM  Admit Dx: ESRD ON DIALYSIS    Subjective: The patient was seen and examined while in bed. No complaints expressed.      lisinopril  5 mg Oral Daily    amLODIPine  5 mg Oral Nightly    b complex-C-folic acid  1 capsule Oral Daily    metoprolol succinate  50 mg Oral Nightly    bumetanide  2 mg Oral BID    darbepoetin jesús-polysorbate  60 mcg Subcutaneous Weekly    albuterol-ipratropium  1 puff Inhalation BID    sodium chloride flush  10 mL Intravenous 2 times per day    heparin (porcine)  5,000 Units Subcutaneous 3 times per day    insulin lispro  0-6 Units Subcutaneous TID WC    insulin lispro  0-3 Units Subcutaneous Nightly       heparin (porcine) 3,200 Units PRN   sodium chloride flush 10 mL PRN   acetaminophen 650 mg Q4H PRN   magnesium hydroxide 30 mL Daily PRN   ondansetron 4 mg Q6H PRN   glucose 15 g PRN   dextrose 12.5 g PRN   glucagon (rDNA) 1 mg PRN   dextrose 100 mL/hr PRN        Objective:    /74   Pulse 72   Temp 98.2 °F (36.8 °C) (Oral)   Resp 16   Ht 5' 3\" (1.6 m)   Wt 189 lb 3.2 oz (85.8 kg)   SpO2 98%   BMI 33.52 kg/m²   General Appearance: alert and oriented to person, place and time  Skin: warm and dry, no rash or erythema  Head: normocephalic and atraumatic  Eyes: extraocular eye movements intact  Neck: neck supple and non tender without mass, no thyromegaly or thyroid nodules, no cervical lymphadenopathy   Pulmonary/Chest: clear to auscultation bilaterally- no wheezes, rales or rhonchi, normal air movement, no respiratory distress  Cardiovascular: normal rate and normal S1 and S2  Abdomen: soft, non-tender, non-distended, normal bowel sounds, no masses or organomegaly  Extremities: no cyanosis and no clubbing  Musculoskeletal: normal range of motion, no joint swelling, deformity or tenderness      Recent Labs      03/13/18   0400   NA  137   K  5.0   CL  99   CO2  24   BUN  36*   CREATININE

## 2018-03-15 LAB
METER GLUCOSE: 109 MG/DL (ref 70–110)
METER GLUCOSE: 150 MG/DL (ref 70–110)
METER GLUCOSE: 201 MG/DL (ref 70–110)
METER GLUCOSE: 233 MG/DL (ref 70–110)

## 2018-03-15 PROCEDURE — 82962 GLUCOSE BLOOD TEST: CPT

## 2018-03-15 PROCEDURE — 6370000000 HC RX 637 (ALT 250 FOR IP): Performed by: INTERNAL MEDICINE

## 2018-03-15 PROCEDURE — 90935 HEMODIALYSIS ONE EVALUATION: CPT

## 2018-03-15 PROCEDURE — 6360000002 HC RX W HCPCS: Performed by: INTERNAL MEDICINE

## 2018-03-15 PROCEDURE — 1200000000 HC SEMI PRIVATE

## 2018-03-15 PROCEDURE — 94640 AIRWAY INHALATION TREATMENT: CPT

## 2018-03-15 RX ADMIN — INSULIN LISPRO 1 UNITS: 100 INJECTION, SOLUTION INTRAVENOUS; SUBCUTANEOUS at 21:20

## 2018-03-15 RX ADMIN — LISINOPRIL 5 MG: 5 TABLET ORAL at 13:42

## 2018-03-15 RX ADMIN — INSULIN LISPRO 2 UNITS: 100 INJECTION, SOLUTION INTRAVENOUS; SUBCUTANEOUS at 19:14

## 2018-03-15 RX ADMIN — INSULIN LISPRO 1 UNITS: 100 INJECTION, SOLUTION INTRAVENOUS; SUBCUTANEOUS at 07:49

## 2018-03-15 RX ADMIN — DARBEPOETIN ALFA 60 MCG: 60 INJECTION, SOLUTION INTRAVENOUS; SUBCUTANEOUS at 13:43

## 2018-03-15 RX ADMIN — AMLODIPINE BESYLATE 5 MG: 5 TABLET ORAL at 21:16

## 2018-03-15 RX ADMIN — HEPARIN SODIUM 5000 UNITS: 10000 INJECTION, SOLUTION INTRAVENOUS; SUBCUTANEOUS at 13:43

## 2018-03-15 RX ADMIN — HEPARIN SODIUM 5000 UNITS: 10000 INJECTION, SOLUTION INTRAVENOUS; SUBCUTANEOUS at 07:50

## 2018-03-15 RX ADMIN — NEPHROCAP 1 MG: 1 CAP ORAL at 13:42

## 2018-03-15 RX ADMIN — HEPARIN SODIUM 5000 UNITS: 10000 INJECTION, SOLUTION INTRAVENOUS; SUBCUTANEOUS at 21:18

## 2018-03-15 RX ADMIN — METOPROLOL SUCCINATE 50 MG: 50 TABLET, FILM COATED, EXTENDED RELEASE ORAL at 21:16

## 2018-03-15 ASSESSMENT — PAIN SCALES - GENERAL
PAINLEVEL_OUTOF10: 0

## 2018-03-15 NOTE — PROGRESS NOTES
CO2  24  26   BUN  36*  27*   CREATININE  4.6*  3.7*   GLUCOSE  116*  209*   CALCIUM  8.6  8.5*       Recent Labs      03/14/18   1305   WBC  6.1   RBC  3.26*   HGB  10.5*   HCT  31.5*   MCV  96.6   MCH  32.2   MCHC  33.3   RDW  14.6   PLT  233   MPV  11.6       CBC:   Lab Results   Component Value Date    WBC 6.1 03/14/2018    RBC 3.26 03/14/2018    HGB 10.5 03/14/2018    HCT 31.5 03/14/2018    MCV 96.6 03/14/2018    MCH 32.2 03/14/2018    MCHC 33.3 03/14/2018    RDW 14.6 03/14/2018     03/14/2018    MPV 11.6 03/14/2018     CBC with Differential:    Lab Results   Component Value Date    WBC 6.1 03/14/2018    RBC 3.26 03/14/2018    HGB 10.5 03/14/2018    HCT 31.5 03/14/2018     03/14/2018    MCV 96.6 03/14/2018    MCH 32.2 03/14/2018    MCHC 33.3 03/14/2018    RDW 14.6 03/14/2018    LYMPHOPCT 11.1 02/22/2018    MONOPCT 8.8 02/22/2018    BASOPCT 0.0 02/22/2018    MONOSABS 0.57 02/22/2018    LYMPHSABS 0.72 02/22/2018    EOSABS 0.09 02/22/2018    BASOSABS 0.00 02/22/2018     CMP:    Lab Results   Component Value Date     03/14/2018    K 5.0 03/14/2018     03/14/2018    CO2 26 03/14/2018    BUN 27 03/14/2018    CREATININE 3.7 03/14/2018    GFRAA 20 03/14/2018    LABGLOM 17 03/14/2018    GLUCOSE 209 03/14/2018    PROT 6.0 03/14/2018    LABALBU 3.2 03/14/2018    CALCIUM 8.5 03/14/2018    BILITOT 0.4 03/14/2018    ALKPHOS 102 03/14/2018    AST 20 03/14/2018    ALT 19 03/14/2018     BMP:    Lab Results   Component Value Date     03/14/2018    K 5.0 03/14/2018     03/14/2018    CO2 26 03/14/2018    BUN 27 03/14/2018    LABALBU 3.2 03/14/2018    CREATININE 3.7 03/14/2018    CALCIUM 8.5 03/14/2018    GFRAA 20 03/14/2018    LABGLOM 17 03/14/2018    GLUCOSE 209 03/14/2018        Radiology:   US DVT LOWER BILATERAL COMPLETE   Final Result   Patent deep venous system of the Bilateral lower   extremity. No evidence for DVT. CT Chest WO Contrast   Final Result   1.  Bilateral pleural

## 2018-03-15 NOTE — CARE COORDINATION
Social Work discharge planning addendum-   Per pt & dtr, pt's Dr in [facility] is Lana Workman phone [phone] fax [phone] at [address] [facility]. Ronaldo called Dr Andrew Arnold and explained to him what has been done to try to get pt set up with outpt hemo without success. Dr Andrew Arnold said he will be up to talk to pt in about half an hour. RONALDO updated charge [employer].   Electronically signed by Francine Smith on 3/15/2018 at 2:47 PM

## 2018-03-16 VITALS
SYSTOLIC BLOOD PRESSURE: 130 MMHG | HEART RATE: 76 BPM | TEMPERATURE: 98.6 F | BODY MASS INDEX: 32.23 KG/M2 | DIASTOLIC BLOOD PRESSURE: 77 MMHG | HEIGHT: 63 IN | RESPIRATION RATE: 18 BRPM | WEIGHT: 181.88 LBS | OXYGEN SATURATION: 94 %

## 2018-03-16 LAB
ALBUMIN SERPL-MCNC: 3.1 G/DL (ref 3.5–5.2)
ALP BLD-CCNC: 102 U/L (ref 40–129)
ALT SERPL-CCNC: 17 U/L (ref 0–40)
ANION GAP SERPL CALCULATED.3IONS-SCNC: 13 MMOL/L (ref 7–16)
AST SERPL-CCNC: 20 U/L (ref 0–39)
BILIRUB SERPL-MCNC: 0.4 MG/DL (ref 0–1.2)
BUN BLDV-MCNC: 22 MG/DL (ref 8–23)
CALCIUM SERPL-MCNC: 8.4 MG/DL (ref 8.6–10.2)
CHLORIDE BLD-SCNC: 102 MMOL/L (ref 98–107)
CO2: 27 MMOL/L (ref 22–29)
CREAT SERPL-MCNC: 3.4 MG/DL (ref 0.7–1.2)
GFR AFRICAN AMERICAN: 22
GFR NON-AFRICAN AMERICAN: 18 ML/MIN/1.73
GLUCOSE BLD-MCNC: 164 MG/DL (ref 74–109)
HCT VFR BLD CALC: 31.5 % (ref 37–54)
HEMOGLOBIN: 10.5 G/DL (ref 12.5–16.5)
MAGNESIUM: 2.2 MG/DL (ref 1.6–2.6)
MCH RBC QN AUTO: 31.9 PG (ref 26–35)
MCHC RBC AUTO-ENTMCNC: 33.3 % (ref 32–34.5)
MCV RBC AUTO: 95.7 FL (ref 80–99.9)
METER GLUCOSE: 127 MG/DL (ref 70–110)
METER GLUCOSE: 189 MG/DL (ref 70–110)
PDW BLD-RTO: 14.4 FL (ref 11.5–15)
PHOSPHORUS: 3.7 MG/DL (ref 2.5–4.5)
PLATELET # BLD: 215 E9/L (ref 130–450)
PMV BLD AUTO: 11.7 FL (ref 7–12)
POTASSIUM SERPL-SCNC: 4.6 MMOL/L (ref 3.5–5)
RBC # BLD: 3.29 E12/L (ref 3.8–5.8)
SODIUM BLD-SCNC: 142 MMOL/L (ref 132–146)
TOTAL PROTEIN: 5.9 G/DL (ref 6.4–8.3)
WBC # BLD: 6.7 E9/L (ref 4.5–11.5)

## 2018-03-16 PROCEDURE — 36415 COLL VENOUS BLD VENIPUNCTURE: CPT

## 2018-03-16 PROCEDURE — 6360000002 HC RX W HCPCS: Performed by: INTERNAL MEDICINE

## 2018-03-16 PROCEDURE — 6370000000 HC RX 637 (ALT 250 FOR IP): Performed by: INTERNAL MEDICINE

## 2018-03-16 PROCEDURE — 90935 HEMODIALYSIS ONE EVALUATION: CPT | Performed by: INTERNAL MEDICINE

## 2018-03-16 PROCEDURE — 85027 COMPLETE CBC AUTOMATED: CPT

## 2018-03-16 PROCEDURE — 80053 COMPREHEN METABOLIC PANEL: CPT

## 2018-03-16 PROCEDURE — 80074 ACUTE HEPATITIS PANEL: CPT

## 2018-03-16 PROCEDURE — 90935 HEMODIALYSIS ONE EVALUATION: CPT

## 2018-03-16 PROCEDURE — 84100 ASSAY OF PHOSPHORUS: CPT

## 2018-03-16 PROCEDURE — 82962 GLUCOSE BLOOD TEST: CPT

## 2018-03-16 PROCEDURE — 83735 ASSAY OF MAGNESIUM: CPT

## 2018-03-16 RX ORDER — BUMETANIDE 2 MG/1
2 TABLET ORAL 2 TIMES DAILY
Qty: 30 TABLET | Refills: 0 | Status: ON HOLD | OUTPATIENT
Start: 2018-03-16 | End: 2018-05-09 | Stop reason: HOSPADM

## 2018-03-16 RX ORDER — METOPROLOL SUCCINATE 50 MG/1
50 TABLET, EXTENDED RELEASE ORAL NIGHTLY
Qty: 30 TABLET | Refills: 0 | Status: SHIPPED | OUTPATIENT
Start: 2018-03-16 | End: 2018-08-05

## 2018-03-16 RX ADMIN — HEPARIN SODIUM 5000 UNITS: 10000 INJECTION, SOLUTION INTRAVENOUS; SUBCUTANEOUS at 06:47

## 2018-03-16 RX ADMIN — INSULIN LISPRO 1 UNITS: 100 INJECTION, SOLUTION INTRAVENOUS; SUBCUTANEOUS at 07:44

## 2018-03-16 RX ADMIN — LISINOPRIL 5 MG: 5 TABLET ORAL at 16:44

## 2018-03-16 RX ADMIN — HEPARIN SODIUM 5000 UNITS: 10000 INJECTION, SOLUTION INTRAVENOUS; SUBCUTANEOUS at 14:00

## 2018-03-16 RX ADMIN — NEPHROCAP 1 MG: 1 CAP ORAL at 14:00

## 2018-03-16 ASSESSMENT — PAIN SCALES - GENERAL
PAINLEVEL_OUTOF10: 0

## 2018-03-16 NOTE — CARE COORDINATION
Social Work discharge planning  Sw spoke to Manuelito Antonio and Birgit Hidalgo with [address] out pt hemo. Awaiting pt to return to floor from dialysis to talk to pt.   Electronically signed by Laurel Dubois on 3/16/2018 at 11:40 AM

## 2018-03-16 NOTE — PROGRESS NOTES
per day    insulin lispro  0-6 Units Subcutaneous TID WC    insulin lispro  0-3 Units Subcutaneous Nightly      dextrose       heparin (porcine), sodium chloride flush, acetaminophen, magnesium hydroxide, ondansetron, glucose, dextrose, glucagon (rDNA), dextrose    IMPRESSION/RECOMMENDATIONS:      ESRD -tolerating the IHD well this AM-attempting 2.8L vol removal-stable for discharge from a renal standpoint once the final decision re: what the pt and his family are going to do re: dialysis    Anemia of CKD-HgB  At goal 10-11- on rowena    HTN with CKD 5/ESRD -BP today at goal <140/90-no new change    Secondary hyperparathyroidism of renal origin- binders on hold-PO4 [address] [facility] MD Monica  3/16/2018 2:56 PM

## 2018-03-16 NOTE — PROGRESS NOTES
Spoke to patient and his family and they are still trying to figure out if there is anything they can do in order to [facility] They do not have any immediate travel plans in place at this point in time.     Electronically signed by Layton Fields RN on 3/15/2018 at 10:06 PM

## 2018-03-16 NOTE — DISCHARGE SUMMARY
[facility] Hospitalist Physician Discharge Summary     Activity level:  As tolerated    Diet: renal diet    Dispo: Home         Patient ID:  Jhonny Gold  [id number]  34 y.o.  1950    Admit date: 2/21/2018    Discharge date and time:  3/16/2018  2:45 PM    Admission Diagnoses: Principal Problem:    Fluid overload  Active Problems:    Diabetes mellitus ([facility] [address]    Essential hypertension    ESRD on dialysis ([facility] [address]    Anemia  Resolved Problems:    * No resolved hospital problems. *      Discharge Diagnoses: Principal Problem:    Fluid overload  Active Problems:    Diabetes mellitus ([address]    Essential hypertension    ESRD on dialysis ([facility] [address]    Anemia  Resolved Problems:    * No resolved hospital problems. *      Consults:  IP CONSULT TO NEPHROLOGY  IP CONSULT TO SOCIAL WORK  IP CONSULT TO NEPHROLOGY  IP CONSULT TO SOCIAL WORK  IP CONSULT TO IV TEAM  IP CONSULT TO INFECTIOUS DISEASES  IP CONSULT TO IV TEAM  IP CONSULT TO IV TEAM      History of Present Illness:    81-year-old male with history of diabetes and hypertension, recently ended up with end-stage renal disease and started on hemodialysis in [facility].  Patient was in [facility] for the last 3 months, over the last 3 weeks he had some medical complications. He ended up in the hospital for pneumonia and he was started also on dialysis for end-stage renal disease, the patient was discharged home and finished a course of antibiotics, he followed up with dialysis Center for 3 sessions yet he ended up back in the hospital for shortness of breath and fluid overload. The patient was treated with dialysis over there, he was discharged from hospital 2 days ago, he went straight to the airport and flew to the [place], he ended up missing his connection flight from [place], and he spent the night at the airport.   He traveled to [place] and arrived today, and when his son saw her and noted his shortness of breath and lower extremity edema he brought him to the hospital.  The [P.O.:360]  Out: 2800       LABS:  Recent Labs      03/14/18   1305  03/16/18   0820   NA  138  142   K  5.0  4.6   CL  103  102   CO2  26  27   BUN  27*  22   CREATININE  3.7*  3.4*   GLUCOSE  209*  164*   CALCIUM  8.5*  8.4*       Recent Labs      03/14/18   1305  03/16/18   0820   WBC  6.1  6.7   RBC  3.26*  3.29*   HGB  10.5*  10.5*   HCT  31.5*  31.5*   MCV  96.6  95.7   MCH  32.2  31.9   MCHC  33.3  33.3   RDW  14.6  14.4   PLT  233  215   MPV  11.6  11.7       No results for input(s): POCGLU in the last 72 hours.     CBC:   Lab Results   Component Value Date    WBC 6.7 03/16/2018    RBC 3.29 03/16/2018    HGB 10.5 03/16/2018    HCT 31.5 03/16/2018    MCV 95.7 03/16/2018    MCH 31.9 03/16/2018    MCHC 33.3 03/16/2018    RDW 14.4 03/16/2018     03/16/2018    MPV 11.7 03/16/2018     CBC with Differential:    Lab Results   Component Value Date    WBC 6.7 03/16/2018    RBC 3.29 03/16/2018    HGB 10.5 03/16/2018    HCT 31.5 03/16/2018     03/16/2018    MCV 95.7 03/16/2018    MCH 31.9 03/16/2018    MCHC 33.3 03/16/2018    RDW 14.4 03/16/2018    LYMPHOPCT 11.1 02/22/2018    MONOPCT 8.8 02/22/2018    BASOPCT 0.0 02/22/2018    MONOSABS 0.57 02/22/2018    LYMPHSABS 0.72 02/22/2018    EOSABS 0.09 02/22/2018    BASOSABS 0.00 02/22/2018     CMP:    Lab Results   Component Value Date     03/16/2018    K 4.6 03/16/2018     03/16/2018    CO2 27 03/16/2018    BUN 22 03/16/2018    CREATININE 3.4 03/16/2018    GFRAA 22 03/16/2018    LABGLOM 18 03/16/2018    GLUCOSE 164 03/16/2018    PROT 5.9 03/16/2018    LABALBU 3.1 03/16/2018    CALCIUM 8.4 03/16/2018    BILITOT 0.4 03/16/2018    ALKPHOS 102 03/16/2018    AST 20 03/16/2018    ALT 17 03/16/2018     BMP:    Lab Results   Component Value Date     03/16/2018    K 4.6 03/16/2018     03/16/2018    CO2 27 03/16/2018    BUN 22 03/16/2018    LABALBU 3.1 03/16/2018    CREATININE 3.4 03/16/2018    CALCIUM 8.4 03/16/2018    GFRAA 22 03/16/2018 LABGLOM 18 03/16/2018    GLUCOSE 164 03/16/2018       Imaging:  Ct Chest Wo Contrast    Result Date: 2/21/2018  EXAM: CT CHEST WO CONTRAST INDICATION:  hemoptysis  COMPARISON: October 17, 2016 TECHNIQUE: Axial CT images of the chest were performed without contrast Reformatted images in the coronal planes and sagittal planes provided. AEC utilized for dose reduction technique. FINDINGS: THORACIC AORTA:  The thoracic aorta is normal.  No aneurysm is noted. PULMONARY ARTERY:  The pulmonary artery is not enlarged and is normal in contour. MEDIASTINUM:  No size-significant lymphadenopathy. Small pericardial effusion noted. The heart is otherwise unremarkable. No pericardial effusion. LUNG PARENCHYMA:  There are no lung masses or suspicious nodules noted. Bilateral pleural effusions noted, left greater than right there is accompanying atelectasis. Shona Latosha No pneumothorax. SUPRACLAVICULAR SPACE AND AXILLA:  No significant lymphadenopathy. UPPER ABDOMEN:  Grossly unremarkable. OSSEOUS STRUCTURES:  Grossly unremarkable. 1. Bilateral pleural effusions, left greater than right. 2. Small pericardial effusion. Nm Lung Vent/perfusion (vq)    Result Date: 2/21/2018  Clinical history:  hemoptysis; shortness of breath Agents:  10.8 mCi Xe-133 gas inhaled             7.6 mCi technetium-99m MAA I.V. Procedure: Images of lung ventilation were obtained in three phases. Perfusion lung images were then obtained in multiple views. Findings: After rebreathing radioXenon to equilibrium, activity is evenly distributed throughout the lungs. In the washout phase, the gas is cleared from the lungs without regional retention. The perfusion images demonstrate a slightly inhomogeneous distribution of activity throughout both lungs. No segmental or significant subsegmental perfusion defects are identified. Findings are consistent with a low probability of pulmonary embolism.  This result should be interpreted in conjunction with the clinical

## 2018-03-19 LAB
HAV IGM SER IA-ACNC: NORMAL
HEPATITIS B CORE IGM ANTIBODY: NORMAL
HEPATITIS B SURFACE ANTIGEN INTERPRETATION: NORMAL
HEPATITIS C ANTIBODY INTERPRETATION: NORMAL

## 2018-04-26 ENCOUNTER — HOSPITAL ENCOUNTER (EMERGENCY)
Age: 68
Discharge: HOME | End: 2018-04-26
Attending: EMERGENCY MEDICINE
Payer: MEDICAID

## 2018-04-26 ENCOUNTER — APPOINTMENT (OUTPATIENT)
Dept: GENERAL RADIOLOGY | Age: 68
End: 2018-04-26
Payer: MEDICAID

## 2018-04-26 VITALS
SYSTOLIC BLOOD PRESSURE: 158 MMHG | HEIGHT: 63 IN | HEART RATE: 84 BPM | WEIGHT: 181 LBS | RESPIRATION RATE: 14 BRPM | BODY MASS INDEX: 32.07 KG/M2 | TEMPERATURE: 98.3 F | OXYGEN SATURATION: 98 % | DIASTOLIC BLOOD PRESSURE: 95 MMHG

## 2018-04-26 DIAGNOSIS — I45.2 BLOCK, BIFASCICULAR: ICD-10-CM

## 2018-04-26 DIAGNOSIS — E87.5 HYPERKALEMIA: ICD-10-CM

## 2018-04-26 DIAGNOSIS — N18.6 END STAGE RENAL DISEASE ON DIALYSIS (HCC): Primary | ICD-10-CM

## 2018-04-26 DIAGNOSIS — Z99.2 END STAGE RENAL DISEASE ON DIALYSIS (HCC): Primary | ICD-10-CM

## 2018-04-26 LAB
ALBUMIN SERPL-MCNC: 3.3 G/DL (ref 3.5–5.2)
ALP BLD-CCNC: 128 U/L (ref 40–129)
ALT SERPL-CCNC: 19 U/L (ref 0–40)
ANION GAP SERPL CALCULATED.3IONS-SCNC: 14 MMOL/L (ref 7–16)
AST SERPL-CCNC: 19 U/L (ref 0–39)
BACTERIA: ABNORMAL /HPF
BASOPHILS ABSOLUTE: 0.04 E9/L (ref 0–0.2)
BASOPHILS RELATIVE PERCENT: 0.7 % (ref 0–2)
BILIRUB SERPL-MCNC: 0.7 MG/DL (ref 0–1.2)
BILIRUBIN URINE: NEGATIVE
BLOOD, URINE: ABNORMAL
BUN BLDV-MCNC: 58 MG/DL (ref 8–23)
CALCIUM SERPL-MCNC: 8.5 MG/DL (ref 8.6–10.2)
CASTS: ABNORMAL /LPF
CHLORIDE BLD-SCNC: 104 MMOL/L (ref 98–107)
CHP ED QC CHECK: YES
CLARITY: CLEAR
CO2: 17 MMOL/L (ref 22–29)
COLOR: YELLOW
CREAT SERPL-MCNC: 6.9 MG/DL (ref 0.7–1.2)
EKG ATRIAL RATE: 73 BPM
EKG P AXIS: 67 DEGREES
EKG P-R INTERVAL: 256 MS
EKG Q-T INTERVAL: 462 MS
EKG QRS DURATION: 130 MS
EKG QTC CALCULATION (BAZETT): 508 MS
EKG R AXIS: -78 DEGREES
EKG T AXIS: 39 DEGREES
EKG VENTRICULAR RATE: 73 BPM
EOSINOPHILS ABSOLUTE: 0.18 E9/L (ref 0.05–0.5)
EOSINOPHILS RELATIVE PERCENT: 3 % (ref 0–6)
GFR AFRICAN AMERICAN: 10
GFR NON-AFRICAN AMERICAN: 8 ML/MIN/1.73
GLUCOSE BLD-MCNC: 153 MG/DL (ref 74–109)
GLUCOSE BLD-MCNC: 178 MG/DL
GLUCOSE URINE: 250 MG/DL
HCT VFR BLD CALC: 31.9 % (ref 37–54)
HEMOGLOBIN: 10.1 G/DL (ref 12.5–16.5)
IMMATURE GRANULOCYTES #: 0.02 E9/L
IMMATURE GRANULOCYTES %: 0.3 % (ref 0–5)
KETONES, URINE: NEGATIVE MG/DL
LEUKOCYTE ESTERASE, URINE: NEGATIVE
LYMPHOCYTES ABSOLUTE: 0.92 E9/L (ref 1.5–4)
LYMPHOCYTES RELATIVE PERCENT: 15.5 % (ref 20–42)
MCH RBC QN AUTO: 32 PG (ref 26–35)
MCHC RBC AUTO-ENTMCNC: 31.7 % (ref 32–34.5)
MCV RBC AUTO: 100.9 FL (ref 80–99.9)
METER GLUCOSE: 178 MG/DL (ref 70–110)
MONOCYTES ABSOLUTE: 0.43 E9/L (ref 0.1–0.95)
MONOCYTES RELATIVE PERCENT: 7.3 % (ref 2–12)
NEUTROPHILS ABSOLUTE: 4.33 E9/L (ref 1.8–7.3)
NEUTROPHILS RELATIVE PERCENT: 73.2 % (ref 43–80)
NITRITE, URINE: NEGATIVE
PDW BLD-RTO: 15.3 FL (ref 11.5–15)
PH UA: 6 (ref 5–9)
PLATELET # BLD: 194 E9/L (ref 130–450)
PMV BLD AUTO: 12 FL (ref 7–12)
POTASSIUM SERPL-SCNC: 6 MMOL/L (ref 3.5–5)
PROTEIN UA: >=300 MG/DL
RBC # BLD: 3.16 E12/L (ref 3.8–5.8)
RBC UA: ABNORMAL /HPF (ref 0–2)
SODIUM BLD-SCNC: 135 MMOL/L (ref 132–146)
SPECIFIC GRAVITY UA: 1.02 (ref 1–1.03)
TOTAL PROTEIN: 6.5 G/DL (ref 6.4–8.3)
UROBILINOGEN, URINE: 0.2 E.U./DL
WBC # BLD: 5.9 E9/L (ref 4.5–11.5)
WBC UA: ABNORMAL /HPF (ref 0–5)

## 2018-04-26 PROCEDURE — 90935 HEMODIALYSIS ONE EVALUATION: CPT | Performed by: INTERNAL MEDICINE

## 2018-04-26 PROCEDURE — 82962 GLUCOSE BLOOD TEST: CPT

## 2018-04-26 PROCEDURE — 85025 COMPLETE CBC W/AUTO DIFF WBC: CPT

## 2018-04-26 PROCEDURE — 36415 COLL VENOUS BLD VENIPUNCTURE: CPT

## 2018-04-26 PROCEDURE — 6370000000 HC RX 637 (ALT 250 FOR IP): Performed by: EMERGENCY MEDICINE

## 2018-04-26 PROCEDURE — 99284 EMERGENCY DEPT VISIT MOD MDM: CPT

## 2018-04-26 PROCEDURE — 71045 X-RAY EXAM CHEST 1 VIEW: CPT

## 2018-04-26 PROCEDURE — 81001 URINALYSIS AUTO W/SCOPE: CPT

## 2018-04-26 PROCEDURE — 93005 ELECTROCARDIOGRAM TRACING: CPT | Performed by: EMERGENCY MEDICINE

## 2018-04-26 PROCEDURE — 94760 N-INVAS EAR/PLS OXIMETRY 1: CPT

## 2018-04-26 PROCEDURE — 80074 ACUTE HEPATITIS PANEL: CPT

## 2018-04-26 PROCEDURE — 80053 COMPREHEN METABOLIC PANEL: CPT

## 2018-04-26 RX ORDER — DEXTROSE MONOHYDRATE 25 G/50ML
50 INJECTION, SOLUTION INTRAVENOUS ONCE
Status: DISCONTINUED | OUTPATIENT
Start: 2018-04-26 | End: 2018-04-26

## 2018-04-26 RX ORDER — ERGOCALCIFEROL 1.25 MG/1
50000 CAPSULE ORAL WEEKLY
Status: DISCONTINUED | OUTPATIENT
Start: 2018-04-26 | End: 2018-04-26 | Stop reason: HOSPADM

## 2018-04-26 RX ORDER — SODIUM CHLORIDE 0.9 % (FLUSH) 0.9 %
10 SYRINGE (ML) INJECTION PRN
Status: DISCONTINUED | OUTPATIENT
Start: 2018-04-26 | End: 2018-04-26 | Stop reason: HOSPADM

## 2018-04-26 RX ORDER — AMLODIPINE BESYLATE 5 MG/1
5 TABLET ORAL DAILY
Status: DISCONTINUED | OUTPATIENT
Start: 2018-04-26 | End: 2018-04-26 | Stop reason: HOSPADM

## 2018-04-26 RX ORDER — SODIUM POLYSTYRENE SULFONATE 15 G/60ML
30 SUSPENSION ORAL; RECTAL ONCE
Status: COMPLETED | OUTPATIENT
Start: 2018-04-26 | End: 2018-04-26

## 2018-04-26 RX ORDER — CALCIUM GLUCONATE 94 MG/ML
2 INJECTION, SOLUTION INTRAVENOUS ONCE
Status: DISCONTINUED | OUTPATIENT
Start: 2018-04-26 | End: 2018-04-26 | Stop reason: SDUPTHER

## 2018-04-26 RX ORDER — METOPROLOL SUCCINATE 50 MG/1
50 TABLET, EXTENDED RELEASE ORAL NIGHTLY
Status: DISCONTINUED | OUTPATIENT
Start: 2018-04-26 | End: 2018-04-26 | Stop reason: HOSPADM

## 2018-04-26 RX ADMIN — SODIUM POLYSTYRENE SULFONATE 30 G: 15 SUSPENSION ORAL; RECTAL at 20:30

## 2018-04-26 ASSESSMENT — ENCOUNTER SYMPTOMS
NAUSEA: 1
ABDOMINAL PAIN: 0
VOMITING: 0
BACK PAIN: 1
SHORTNESS OF BREATH: 1
RHINORRHEA: 0
SORE THROAT: 0
COUGH: 0
COLOR CHANGE: 0
BLOOD IN STOOL: 0
CHEST TIGHTNESS: 0
DIARRHEA: 0
EYE PAIN: 0

## 2018-04-26 ASSESSMENT — PAIN SCALES - GENERAL
PAINLEVEL_OUTOF10: 0
PAINLEVEL_OUTOF10: 0

## 2018-05-04 ENCOUNTER — APPOINTMENT (OUTPATIENT)
Dept: GENERAL RADIOLOGY | Age: 68
DRG: 194 | End: 2018-05-04
Payer: MEDICAID

## 2018-05-04 ENCOUNTER — APPOINTMENT (OUTPATIENT)
Dept: CT IMAGING | Age: 68
DRG: 194 | End: 2018-05-04
Payer: MEDICAID

## 2018-05-04 ENCOUNTER — HOSPITAL ENCOUNTER (INPATIENT)
Age: 68
LOS: 5 days | Discharge: HOME | DRG: 194 | End: 2018-05-09
Attending: EMERGENCY MEDICINE | Admitting: FAMILY MEDICINE
Payer: MEDICAID

## 2018-05-04 DIAGNOSIS — R18.8 OTHER ASCITES: ICD-10-CM

## 2018-05-04 DIAGNOSIS — R77.8 ELEVATED TROPONIN: ICD-10-CM

## 2018-05-04 DIAGNOSIS — Z91.199 MEDICAL NON-COMPLIANCE: ICD-10-CM

## 2018-05-04 DIAGNOSIS — Z99.2 ESRD NEEDING DIALYSIS (HCC): ICD-10-CM

## 2018-05-04 DIAGNOSIS — J90 PLEURAL EFFUSION: ICD-10-CM

## 2018-05-04 DIAGNOSIS — N18.6 ESRD NEEDING DIALYSIS (HCC): ICD-10-CM

## 2018-05-04 DIAGNOSIS — R79.89 ELEVATED LIVER FUNCTION TESTS: ICD-10-CM

## 2018-05-04 DIAGNOSIS — R10.84 GENERALIZED ABDOMINAL PAIN: Primary | ICD-10-CM

## 2018-05-04 LAB
ALBUMIN SERPL-MCNC: 3.5 G/DL (ref 3.5–5.2)
ALP BLD-CCNC: 121 U/L (ref 40–129)
ALT SERPL-CCNC: 21 U/L (ref 0–40)
ANION GAP SERPL CALCULATED.3IONS-SCNC: 15 MMOL/L (ref 7–16)
AST SERPL-CCNC: 24 U/L (ref 0–39)
BACTERIA: ABNORMAL /HPF
BILIRUB SERPL-MCNC: 1.1 MG/DL (ref 0–1.2)
BILIRUBIN DIRECT: 0.3 MG/DL (ref 0–0.3)
BILIRUBIN URINE: ABNORMAL
BILIRUBIN, INDIRECT: 0.8 MG/DL (ref 0–1)
BLOOD, URINE: ABNORMAL
BUN BLDV-MCNC: 40 MG/DL (ref 8–23)
CALCIUM SERPL-MCNC: 8.5 MG/DL (ref 8.6–10.2)
CASTS: ABNORMAL /LPF
CHLORIDE BLD-SCNC: 94 MMOL/L (ref 98–107)
CLARITY: CLEAR
CO2: 28 MMOL/L (ref 22–29)
COLOR: YELLOW
CREAT SERPL-MCNC: 5 MG/DL (ref 0.7–1.2)
EPITHELIAL CELLS, UA: ABNORMAL /HPF
GFR AFRICAN AMERICAN: 14
GFR NON-AFRICAN AMERICAN: 12 ML/MIN/1.73
GLUCOSE BLD-MCNC: 127 MG/DL (ref 74–109)
GLUCOSE URINE: 100 MG/DL
HCT VFR BLD CALC: 29.3 % (ref 37–54)
HEMOGLOBIN: 9.8 G/DL (ref 12.5–16.5)
KETONES, URINE: NEGATIVE MG/DL
LACTIC ACID: 1.3 MMOL/L (ref 0.5–2.2)
LEUKOCYTE ESTERASE, URINE: NEGATIVE
LIPASE: 16 U/L (ref 13–60)
MAGNESIUM: 2.1 MG/DL (ref 1.6–2.6)
MCH RBC QN AUTO: 32.6 PG (ref 26–35)
MCHC RBC AUTO-ENTMCNC: 33.4 % (ref 32–34.5)
MCV RBC AUTO: 97.3 FL (ref 80–99.9)
METER GLUCOSE: 97 MG/DL (ref 70–110)
NITRITE, URINE: NEGATIVE
PDW BLD-RTO: 14.6 FL (ref 11.5–15)
PH UA: 8 (ref 5–9)
PHOSPHORUS: 4.6 MG/DL (ref 2.5–4.5)
PLATELET # BLD: 166 E9/L (ref 130–450)
PMV BLD AUTO: 12.7 FL (ref 7–12)
POTASSIUM SERPL-SCNC: 4.1 MMOL/L (ref 3.5–5)
PROTEIN UA: >=300 MG/DL
RBC # BLD: 3.01 E12/L (ref 3.8–5.8)
RBC UA: ABNORMAL /HPF (ref 0–2)
RENAL EPITHELIAL, UA: ABNORMAL /HPF
SODIUM BLD-SCNC: 137 MMOL/L (ref 132–146)
SPECIFIC GRAVITY UA: 1.02 (ref 1–1.03)
TOTAL PROTEIN: 6.9 G/DL (ref 6.4–8.3)
TROPONIN: 0.38 NG/ML (ref 0–0.03)
TROPONIN: 0.4 NG/ML (ref 0–0.03)
UROBILINOGEN, URINE: 0.2 E.U./DL
WBC # BLD: 5.2 E9/L (ref 4.5–11.5)
WBC UA: ABNORMAL /HPF (ref 0–5)

## 2018-05-04 PROCEDURE — 84484 ASSAY OF TROPONIN QUANT: CPT

## 2018-05-04 PROCEDURE — 83605 ASSAY OF LACTIC ACID: CPT

## 2018-05-04 PROCEDURE — 82962 GLUCOSE BLOOD TEST: CPT

## 2018-05-04 PROCEDURE — 6370000000 HC RX 637 (ALT 250 FOR IP): Performed by: FAMILY MEDICINE

## 2018-05-04 PROCEDURE — 6360000002 HC RX W HCPCS: Performed by: FAMILY MEDICINE

## 2018-05-04 PROCEDURE — 74022 RADEX COMPL AQT ABD SERIES: CPT

## 2018-05-04 PROCEDURE — 74176 CT ABD & PELVIS W/O CONTRAST: CPT

## 2018-05-04 PROCEDURE — 1200000000 HC SEMI PRIVATE

## 2018-05-04 PROCEDURE — 6360000002 HC RX W HCPCS: Performed by: EMERGENCY MEDICINE

## 2018-05-04 PROCEDURE — 94760 N-INVAS EAR/PLS OXIMETRY 1: CPT

## 2018-05-04 PROCEDURE — 80048 BASIC METABOLIC PNL TOTAL CA: CPT

## 2018-05-04 PROCEDURE — 81001 URINALYSIS AUTO W/SCOPE: CPT

## 2018-05-04 PROCEDURE — 99285 EMERGENCY DEPT VISIT HI MDM: CPT

## 2018-05-04 PROCEDURE — 85027 COMPLETE CBC AUTOMATED: CPT

## 2018-05-04 PROCEDURE — 83690 ASSAY OF LIPASE: CPT

## 2018-05-04 PROCEDURE — 80076 HEPATIC FUNCTION PANEL: CPT

## 2018-05-04 PROCEDURE — 2060000000 HC ICU INTERMEDIATE R&B

## 2018-05-04 PROCEDURE — 83735 ASSAY OF MAGNESIUM: CPT

## 2018-05-04 PROCEDURE — 2580000003 HC RX 258: Performed by: FAMILY MEDICINE

## 2018-05-04 PROCEDURE — 6360000004 HC RX CONTRAST MEDICATION: Performed by: RADIOLOGY

## 2018-05-04 PROCEDURE — 84100 ASSAY OF PHOSPHORUS: CPT

## 2018-05-04 PROCEDURE — 36415 COLL VENOUS BLD VENIPUNCTURE: CPT

## 2018-05-04 PROCEDURE — 6370000000 HC RX 637 (ALT 250 FOR IP): Performed by: INTERNAL MEDICINE

## 2018-05-04 PROCEDURE — 96374 THER/PROPH/DIAG INJ IV PUSH: CPT

## 2018-05-04 PROCEDURE — 2580000003 HC RX 258: Performed by: EMERGENCY MEDICINE

## 2018-05-04 RX ORDER — METOPROLOL SUCCINATE 50 MG/1
50 TABLET, EXTENDED RELEASE ORAL NIGHTLY
Status: DISCONTINUED | OUTPATIENT
Start: 2018-05-04 | End: 2018-05-09 | Stop reason: HOSPADM

## 2018-05-04 RX ORDER — MORPHINE SULFATE 2 MG/ML
2 INJECTION, SOLUTION INTRAMUSCULAR; INTRAVENOUS ONCE
Status: COMPLETED | OUTPATIENT
Start: 2018-05-04 | End: 2018-05-04

## 2018-05-04 RX ORDER — LABETALOL HYDROCHLORIDE 5 MG/ML
10 INJECTION, SOLUTION INTRAVENOUS EVERY 4 HOURS PRN
Status: DISCONTINUED | OUTPATIENT
Start: 2018-05-04 | End: 2018-05-09 | Stop reason: HOSPADM

## 2018-05-04 RX ORDER — SODIUM CHLORIDE 0.9 % (FLUSH) 0.9 %
10 SYRINGE (ML) INJECTION PRN
Status: DISCONTINUED | OUTPATIENT
Start: 2018-05-04 | End: 2018-05-09 | Stop reason: HOSPADM

## 2018-05-04 RX ORDER — ONDANSETRON 2 MG/ML
4 INJECTION INTRAMUSCULAR; INTRAVENOUS EVERY 6 HOURS PRN
Status: DISCONTINUED | OUTPATIENT
Start: 2018-05-04 | End: 2018-05-04

## 2018-05-04 RX ORDER — AMLODIPINE BESYLATE 5 MG/1
5 TABLET ORAL DAILY
Status: DISCONTINUED | OUTPATIENT
Start: 2018-05-04 | End: 2018-05-09 | Stop reason: HOSPADM

## 2018-05-04 RX ORDER — HEPARIN SODIUM 10000 [USP'U]/ML
5000 INJECTION, SOLUTION INTRAVENOUS; SUBCUTANEOUS EVERY 12 HOURS
Status: DISCONTINUED | OUTPATIENT
Start: 2018-05-04 | End: 2018-05-09 | Stop reason: HOSPADM

## 2018-05-04 RX ORDER — ERGOCALCIFEROL 1.25 MG/1
50000 CAPSULE ORAL WEEKLY
Status: DISCONTINUED | OUTPATIENT
Start: 2018-05-04 | End: 2018-05-09 | Stop reason: HOSPADM

## 2018-05-04 RX ORDER — PROMETHAZINE HYDROCHLORIDE 25 MG/ML
12.5 INJECTION, SOLUTION INTRAMUSCULAR; INTRAVENOUS EVERY 6 HOURS PRN
Status: DISCONTINUED | OUTPATIENT
Start: 2018-05-04 | End: 2018-05-09 | Stop reason: HOSPADM

## 2018-05-04 RX ORDER — AMLODIPINE BESYLATE 5 MG/1
5 TABLET ORAL EVERY MORNING
Status: DISCONTINUED | OUTPATIENT
Start: 2018-05-05 | End: 2018-05-04 | Stop reason: SDUPTHER

## 2018-05-04 RX ORDER — HYDRALAZINE HYDROCHLORIDE 20 MG/ML
5 INJECTION INTRAMUSCULAR; INTRAVENOUS EVERY 4 HOURS PRN
Status: DISCONTINUED | OUTPATIENT
Start: 2018-05-04 | End: 2018-05-08

## 2018-05-04 RX ORDER — SODIUM CHLORIDE 0.9 % (FLUSH) 0.9 %
10 SYRINGE (ML) INJECTION PRN
Status: DISCONTINUED | OUTPATIENT
Start: 2018-05-04 | End: 2018-05-04

## 2018-05-04 RX ORDER — SODIUM CHLORIDE 0.9 % (FLUSH) 0.9 %
10 SYRINGE (ML) INJECTION EVERY 12 HOURS SCHEDULED
Status: DISCONTINUED | OUTPATIENT
Start: 2018-05-04 | End: 2018-05-09 | Stop reason: HOSPADM

## 2018-05-04 RX ORDER — DEXTROSE MONOHYDRATE 25 G/50ML
12.5 INJECTION, SOLUTION INTRAVENOUS PRN
Status: DISCONTINUED | OUTPATIENT
Start: 2018-05-04 | End: 2018-05-09 | Stop reason: HOSPADM

## 2018-05-04 RX ORDER — BUMETANIDE 1 MG/1
2 TABLET ORAL 2 TIMES DAILY
Status: CANCELLED | OUTPATIENT
Start: 2018-05-04

## 2018-05-04 RX ORDER — NICOTINE POLACRILEX 4 MG
15 LOZENGE BUCCAL PRN
Status: DISCONTINUED | OUTPATIENT
Start: 2018-05-04 | End: 2018-05-09 | Stop reason: HOSPADM

## 2018-05-04 RX ORDER — METOPROLOL SUCCINATE 50 MG/1
50 TABLET, EXTENDED RELEASE ORAL NIGHTLY
Status: DISCONTINUED | OUTPATIENT
Start: 2018-05-04 | End: 2018-05-04 | Stop reason: SDUPTHER

## 2018-05-04 RX ORDER — LIDOCAINE 40 MG/G
CREAM TOPICAL PRN
Status: DISCONTINUED | OUTPATIENT
Start: 2018-05-05 | End: 2018-05-09 | Stop reason: HOSPADM

## 2018-05-04 RX ORDER — ACETAMINOPHEN 325 MG/1
650 TABLET ORAL EVERY 6 HOURS PRN
Status: DISCONTINUED | OUTPATIENT
Start: 2018-05-04 | End: 2018-05-09 | Stop reason: HOSPADM

## 2018-05-04 RX ORDER — DEXTROSE MONOHYDRATE 50 MG/ML
100 INJECTION, SOLUTION INTRAVENOUS PRN
Status: DISCONTINUED | OUTPATIENT
Start: 2018-05-04 | End: 2018-05-09 | Stop reason: HOSPADM

## 2018-05-04 RX ORDER — FAMOTIDINE 20 MG/1
20 TABLET, FILM COATED ORAL 2 TIMES DAILY
Status: DISCONTINUED | OUTPATIENT
Start: 2018-05-04 | End: 2018-05-05 | Stop reason: DRUGHIGH

## 2018-05-04 RX ORDER — ONDANSETRON 2 MG/ML
8 INJECTION INTRAMUSCULAR; INTRAVENOUS ONCE
Status: COMPLETED | OUTPATIENT
Start: 2018-05-04 | End: 2018-05-04

## 2018-05-04 RX ADMIN — METOPROLOL SUCCINATE 50 MG: 50 TABLET, FILM COATED, EXTENDED RELEASE ORAL at 20:51

## 2018-05-04 RX ADMIN — Medication 10 ML: at 21:07

## 2018-05-04 RX ADMIN — HEPARIN SODIUM 5000 UNITS: 10000 INJECTION, SOLUTION INTRAVENOUS; SUBCUTANEOUS at 20:51

## 2018-05-04 RX ADMIN — AMLODIPINE BESYLATE 5 MG: 5 TABLET ORAL at 20:51

## 2018-05-04 RX ADMIN — Medication 10 ML: at 13:33

## 2018-05-04 RX ADMIN — FAMOTIDINE 20 MG: 20 TABLET, FILM COATED ORAL at 20:50

## 2018-05-04 RX ADMIN — ONDANSETRON 8 MG: 2 INJECTION INTRAMUSCULAR; INTRAVENOUS at 13:32

## 2018-05-04 RX ADMIN — IOHEXOL 50 ML: 240 INJECTION, SOLUTION INTRATHECAL; INTRAVASCULAR; INTRAVENOUS; ORAL at 16:09

## 2018-05-04 RX ADMIN — PROMETHAZINE HYDROCHLORIDE 12.5 MG: 25 INJECTION INTRAMUSCULAR; INTRAVENOUS at 23:39

## 2018-05-04 RX ADMIN — ONDANSETRON 4 MG: 2 INJECTION INTRAMUSCULAR; INTRAVENOUS at 21:06

## 2018-05-04 RX ADMIN — MORPHINE SULFATE 2 MG: 2 INJECTION, SOLUTION INTRAMUSCULAR; INTRAVENOUS at 20:51

## 2018-05-04 ASSESSMENT — ENCOUNTER SYMPTOMS
EYE DISCHARGE: 0
ABDOMINAL PAIN: 1
COUGH: 0
ABDOMINAL PAIN: 0
EYE PAIN: 0
SORE THROAT: 0
VOMITING: 1
DIARRHEA: 0
SINUS PRESSURE: 0
VOMITING: 0
EYE REDNESS: 0
BACK PAIN: 0
BLOOD IN STOOL: 0
ABDOMINAL DISTENTION: 1
SHORTNESS OF BREATH: 1
BLURRED VISION: 0
HEMOPTYSIS: 0
WHEEZING: 0
HEARTBURN: 0
NAUSEA: 1

## 2018-05-04 ASSESSMENT — PAIN DESCRIPTION - PROGRESSION
CLINICAL_PROGRESSION: GRADUALLY WORSENING
CLINICAL_PROGRESSION: NOT CHANGED

## 2018-05-04 ASSESSMENT — PAIN DESCRIPTION - LOCATION
LOCATION: ABDOMEN
LOCATION: ABDOMEN

## 2018-05-04 ASSESSMENT — PAIN DESCRIPTION - ORIENTATION: ORIENTATION: RIGHT;MID

## 2018-05-04 ASSESSMENT — PAIN DESCRIPTION - PAIN TYPE
TYPE: ACUTE PAIN
TYPE: ACUTE PAIN

## 2018-05-04 ASSESSMENT — PAIN DESCRIPTION - ONSET
ONSET: ON-GOING
ONSET: ON-GOING

## 2018-05-04 ASSESSMENT — PAIN SCALES - GENERAL
PAINLEVEL_OUTOF10: 4
PAINLEVEL_OUTOF10: 7

## 2018-05-04 ASSESSMENT — PAIN DESCRIPTION - DESCRIPTORS
DESCRIPTORS: ACHING;CONSTANT;DISCOMFORT
DESCRIPTORS: ACHING;DISCOMFORT

## 2018-05-04 ASSESSMENT — PAIN DESCRIPTION - FREQUENCY
FREQUENCY: CONTINUOUS
FREQUENCY: CONTINUOUS

## 2018-05-05 ENCOUNTER — APPOINTMENT (OUTPATIENT)
Dept: GENERAL RADIOLOGY | Age: 68
DRG: 194 | End: 2018-05-05
Payer: MEDICAID

## 2018-05-05 ENCOUNTER — APPOINTMENT (OUTPATIENT)
Dept: ULTRASOUND IMAGING | Age: 68
DRG: 194 | End: 2018-05-05
Payer: MEDICAID

## 2018-05-05 LAB
ALBUMIN SERPL-MCNC: 3.2 G/DL (ref 3.5–5.2)
ALBUMIN SERPL-MCNC: 3.6 G/DL (ref 3.5–5.2)
ALP BLD-CCNC: 114 U/L (ref 40–129)
ALP BLD-CCNC: 118 U/L (ref 40–129)
ALT SERPL-CCNC: 568 U/L (ref 0–40)
ALT SERPL-CCNC: 97 U/L (ref 0–40)
ANION GAP SERPL CALCULATED.3IONS-SCNC: 17 MMOL/L (ref 7–16)
ANION GAP SERPL CALCULATED.3IONS-SCNC: 20 MMOL/L (ref 7–16)
AST SERPL-CCNC: 130 U/L (ref 0–39)
AST SERPL-CCNC: 739 U/L (ref 0–39)
BASOPHILS ABSOLUTE: 0.03 E9/L (ref 0–0.2)
BASOPHILS RELATIVE PERCENT: 0.5 % (ref 0–2)
BILIRUB SERPL-MCNC: 1.8 MG/DL (ref 0–1.2)
BILIRUB SERPL-MCNC: 2.1 MG/DL (ref 0–1.2)
BUN BLDV-MCNC: 25 MG/DL (ref 8–23)
BUN BLDV-MCNC: 44 MG/DL (ref 8–23)
CALCIUM SERPL-MCNC: 8.6 MG/DL (ref 8.6–10.2)
CALCIUM SERPL-MCNC: 8.7 MG/DL (ref 8.6–10.2)
CHLORIDE BLD-SCNC: 91 MMOL/L (ref 98–107)
CHLORIDE BLD-SCNC: 94 MMOL/L (ref 98–107)
CO2: 23 MMOL/L (ref 22–29)
CO2: 26 MMOL/L (ref 22–29)
CREAT SERPL-MCNC: 3.6 MG/DL (ref 0.7–1.2)
CREAT SERPL-MCNC: 5.4 MG/DL (ref 0.7–1.2)
EOSINOPHILS ABSOLUTE: 0.03 E9/L (ref 0.05–0.5)
EOSINOPHILS RELATIVE PERCENT: 0.5 % (ref 0–6)
GFR AFRICAN AMERICAN: 13
GFR AFRICAN AMERICAN: 21
GFR NON-AFRICAN AMERICAN: 11 ML/MIN/1.73
GFR NON-AFRICAN AMERICAN: 17 ML/MIN/1.73
GLUCOSE BLD-MCNC: 115 MG/DL (ref 74–109)
GLUCOSE BLD-MCNC: 132 MG/DL (ref 74–109)
HCT VFR BLD CALC: 31 % (ref 37–54)
HEMOGLOBIN: 10 G/DL (ref 12.5–16.5)
IMMATURE GRANULOCYTES #: 0.03 E9/L
IMMATURE GRANULOCYTES %: 0.5 % (ref 0–5)
LV EF: 43 %
LVEF MODALITY: NORMAL
LYMPHOCYTES ABSOLUTE: 0.84 E9/L (ref 1.5–4)
LYMPHOCYTES RELATIVE PERCENT: 15.4 % (ref 20–42)
MCH RBC QN AUTO: 31.6 PG (ref 26–35)
MCHC RBC AUTO-ENTMCNC: 32.3 % (ref 32–34.5)
MCV RBC AUTO: 98.1 FL (ref 80–99.9)
METER GLUCOSE: 104 MG/DL (ref 70–110)
METER GLUCOSE: 95 MG/DL (ref 70–110)
METER GLUCOSE: 96 MG/DL (ref 70–110)
MONOCYTES ABSOLUTE: 0.5 E9/L (ref 0.1–0.95)
MONOCYTES RELATIVE PERCENT: 9.2 % (ref 2–12)
NEUTROPHILS ABSOLUTE: 4.03 E9/L (ref 1.8–7.3)
NEUTROPHILS RELATIVE PERCENT: 73.9 % (ref 43–80)
PDW BLD-RTO: 14.4 FL (ref 11.5–15)
PLATELET # BLD: 180 E9/L (ref 130–450)
PMV BLD AUTO: 12.5 FL (ref 7–12)
POTASSIUM REFLEX MAGNESIUM: 5 MMOL/L (ref 3.5–5)
POTASSIUM SERPL-SCNC: 4.8 MMOL/L (ref 3.5–5)
RBC # BLD: 3.16 E12/L (ref 3.8–5.8)
SODIUM BLD-SCNC: 134 MMOL/L (ref 132–146)
SODIUM BLD-SCNC: 137 MMOL/L (ref 132–146)
TOTAL PROTEIN: 6.8 G/DL (ref 6.4–8.3)
TOTAL PROTEIN: 7.1 G/DL (ref 6.4–8.3)
TROPONIN: 0.4 NG/ML (ref 0–0.03)
WBC # BLD: 5.5 E9/L (ref 4.5–11.5)

## 2018-05-05 PROCEDURE — 2580000003 HC RX 258: Performed by: FAMILY MEDICINE

## 2018-05-05 PROCEDURE — 82962 GLUCOSE BLOOD TEST: CPT

## 2018-05-05 PROCEDURE — 71046 X-RAY EXAM CHEST 2 VIEWS: CPT

## 2018-05-05 PROCEDURE — 76700 US EXAM ABDOM COMPLETE: CPT

## 2018-05-05 PROCEDURE — 6370000000 HC RX 637 (ALT 250 FOR IP): Performed by: INTERNAL MEDICINE

## 2018-05-05 PROCEDURE — 5A1D70Z PERFORMANCE OF URINARY FILTRATION, INTERMITTENT, LESS THAN 6 HOURS PER DAY: ICD-10-PCS | Performed by: INTERNAL MEDICINE

## 2018-05-05 PROCEDURE — 93306 TTE W/DOPPLER COMPLETE: CPT

## 2018-05-05 PROCEDURE — 6360000002 HC RX W HCPCS: Performed by: INTERNAL MEDICINE

## 2018-05-05 PROCEDURE — 1200000000 HC SEMI PRIVATE

## 2018-05-05 PROCEDURE — 6360000002 HC RX W HCPCS: Performed by: FAMILY MEDICINE

## 2018-05-05 PROCEDURE — 2060000000 HC ICU INTERMEDIATE R&B

## 2018-05-05 PROCEDURE — 99223 1ST HOSP IP/OBS HIGH 75: CPT | Performed by: FAMILY MEDICINE

## 2018-05-05 PROCEDURE — 80053 COMPREHEN METABOLIC PANEL: CPT

## 2018-05-05 PROCEDURE — 84484 ASSAY OF TROPONIN QUANT: CPT

## 2018-05-05 PROCEDURE — 82728 ASSAY OF FERRITIN: CPT

## 2018-05-05 PROCEDURE — 85025 COMPLETE CBC W/AUTO DIFF WBC: CPT

## 2018-05-05 PROCEDURE — 80074 ACUTE HEPATITIS PANEL: CPT

## 2018-05-05 PROCEDURE — 90935 HEMODIALYSIS ONE EVALUATION: CPT

## 2018-05-05 PROCEDURE — 36415 COLL VENOUS BLD VENIPUNCTURE: CPT

## 2018-05-05 PROCEDURE — APPSS60 APP SPLIT SHARED TIME 46-60 MINUTES: Performed by: CLINICAL NURSE SPECIALIST

## 2018-05-05 RX ORDER — ASPIRIN 81 MG/1
81 TABLET, CHEWABLE ORAL DAILY
Status: DISCONTINUED | OUTPATIENT
Start: 2018-05-05 | End: 2018-05-09 | Stop reason: HOSPADM

## 2018-05-05 RX ORDER — HYDRALAZINE HYDROCHLORIDE 25 MG/1
25 TABLET, FILM COATED ORAL EVERY 8 HOURS SCHEDULED
Status: DISCONTINUED | OUTPATIENT
Start: 2018-05-05 | End: 2018-05-07

## 2018-05-05 RX ORDER — ISOSORBIDE DINITRATE 10 MG/1
20 TABLET ORAL 3 TIMES DAILY
Status: DISCONTINUED | OUTPATIENT
Start: 2018-05-05 | End: 2018-05-09 | Stop reason: HOSPADM

## 2018-05-05 RX ORDER — FAMOTIDINE 20 MG/1
20 TABLET, FILM COATED ORAL DAILY
Status: DISCONTINUED | OUTPATIENT
Start: 2018-05-05 | End: 2018-05-06

## 2018-05-05 RX ORDER — ATORVASTATIN CALCIUM 40 MG/1
40 TABLET, FILM COATED ORAL NIGHTLY
Status: DISCONTINUED | OUTPATIENT
Start: 2018-05-05 | End: 2018-05-06

## 2018-05-05 RX ADMIN — ISOSORBIDE DINITRATE 20 MG: 10 TABLET ORAL at 21:54

## 2018-05-05 RX ADMIN — ATORVASTATIN CALCIUM 40 MG: 40 TABLET, FILM COATED ORAL at 21:54

## 2018-05-05 RX ADMIN — HYDRALAZINE HYDROCHLORIDE 25 MG: 25 TABLET ORAL at 22:30

## 2018-05-05 RX ADMIN — Medication 10 ML: at 21:56

## 2018-05-05 RX ADMIN — DARBEPOETIN ALFA 60 MCG: 60 INJECTION, SOLUTION INTRAVENOUS; SUBCUTANEOUS at 21:00

## 2018-05-05 RX ADMIN — ERGOCALCIFEROL 50000 UNITS: 1.25 CAPSULE ORAL at 20:30

## 2018-05-05 RX ADMIN — PROMETHAZINE HYDROCHLORIDE 12.5 MG: 25 INJECTION INTRAMUSCULAR; INTRAVENOUS at 05:06

## 2018-05-05 RX ADMIN — METOPROLOL SUCCINATE 50 MG: 50 TABLET, FILM COATED, EXTENDED RELEASE ORAL at 21:54

## 2018-05-05 ASSESSMENT — PAIN SCALES - GENERAL
PAINLEVEL_OUTOF10: 0

## 2018-05-06 ENCOUNTER — APPOINTMENT (OUTPATIENT)
Dept: ULTRASOUND IMAGING | Age: 68
DRG: 194 | End: 2018-05-06
Payer: MEDICAID

## 2018-05-06 LAB
ALBUMIN SERPL-MCNC: 2.9 G/DL (ref 3.5–5.2)
ALP BLD-CCNC: 112 U/L (ref 40–129)
ALT SERPL-CCNC: 739 U/L (ref 0–40)
AMORPHOUS: ABNORMAL
ANION GAP SERPL CALCULATED.3IONS-SCNC: 17 MMOL/L (ref 7–16)
AST SERPL-CCNC: 904 U/L (ref 0–39)
BACTERIA: ABNORMAL /HPF
BASOPHILS ABSOLUTE: 0.03 E9/L (ref 0–0.2)
BASOPHILS RELATIVE PERCENT: 0.5 % (ref 0–2)
BILIRUB SERPL-MCNC: 2.1 MG/DL (ref 0–1.2)
BILIRUBIN URINE: ABNORMAL
BLOOD, URINE: ABNORMAL
BUN BLDV-MCNC: 31 MG/DL (ref 8–23)
CALCIUM SERPL-MCNC: 8.4 MG/DL (ref 8.6–10.2)
CASTS 2: ABNORMAL /LPF
CASTS UA: ABNORMAL /LPF
CASTS: ABNORMAL /LPF
CHLORIDE BLD-SCNC: 95 MMOL/L (ref 98–107)
CLARITY: ABNORMAL
CO2: 23 MMOL/L (ref 22–29)
COLOR: ABNORMAL
CREAT SERPL-MCNC: 3.9 MG/DL (ref 0.7–1.2)
EOSINOPHILS ABSOLUTE: 0.03 E9/L (ref 0.05–0.5)
EOSINOPHILS RELATIVE PERCENT: 0.5 % (ref 0–6)
FERRITIN: 855 NG/ML
GAMMA GLUTAMYL TRANSFERASE: 53 U/L (ref 10–71)
GFR AFRICAN AMERICAN: 19
GFR NON-AFRICAN AMERICAN: 16 ML/MIN/1.73
GLUCOSE BLD-MCNC: 152 MG/DL (ref 74–109)
GLUCOSE URINE: 250 MG/DL
HBA1C MFR BLD: 6.7 % (ref 4.8–5.9)
HCT VFR BLD CALC: 30.4 % (ref 37–54)
HEMOGLOBIN: 9.9 G/DL (ref 12.5–16.5)
IMMATURE GRANULOCYTES #: 0.02 E9/L
IMMATURE GRANULOCYTES %: 0.3 % (ref 0–5)
IRON SATURATION: 35 % (ref 20–55)
IRON: 76 MCG/DL (ref 59–158)
KETONES, URINE: NEGATIVE MG/DL
LEUKOCYTE ESTERASE, URINE: NEGATIVE
LYMPHOCYTES ABSOLUTE: 1.08 E9/L (ref 1.5–4)
LYMPHOCYTES RELATIVE PERCENT: 18.3 % (ref 20–42)
MAGNESIUM: 2.1 MG/DL (ref 1.6–2.6)
MCH RBC QN AUTO: 32 PG (ref 26–35)
MCHC RBC AUTO-ENTMCNC: 32.6 % (ref 32–34.5)
MCV RBC AUTO: 98.4 FL (ref 80–99.9)
METER GLUCOSE: 107 MG/DL (ref 70–110)
METER GLUCOSE: 131 MG/DL (ref 70–110)
METER GLUCOSE: 166 MG/DL (ref 70–110)
METER GLUCOSE: 251 MG/DL (ref 70–110)
MONOCYTES ABSOLUTE: 0.86 E9/L (ref 0.1–0.95)
MONOCYTES RELATIVE PERCENT: 14.6 % (ref 2–12)
NEUTROPHILS ABSOLUTE: 3.88 E9/L (ref 1.8–7.3)
NEUTROPHILS RELATIVE PERCENT: 65.8 % (ref 43–80)
NITRITE, URINE: NEGATIVE
PDW BLD-RTO: 14.5 FL (ref 11.5–15)
PH UA: 6.5 (ref 5–9)
PHOSPHORUS: 4.5 MG/DL (ref 2.5–4.5)
PLATELET # BLD: 166 E9/L (ref 130–450)
PMV BLD AUTO: 13 FL (ref 7–12)
POTASSIUM REFLEX MAGNESIUM: 5.2 MMOL/L (ref 3.5–5)
PROTEIN UA: >=300 MG/DL
RBC # BLD: 3.09 E12/L (ref 3.8–5.8)
RBC UA: ABNORMAL /HPF (ref 0–2)
RENAL EPITHELIAL, UA: ABNORMAL /HPF
SODIUM BLD-SCNC: 135 MMOL/L (ref 132–146)
SPECIFIC GRAVITY UA: 1.02 (ref 1–1.03)
TOTAL CK: 195 U/L (ref 20–200)
TOTAL IRON BINDING CAPACITY: 218 MCG/DL (ref 250–450)
TOTAL PROTEIN: 6.4 G/DL (ref 6.4–8.3)
UROBILINOGEN, URINE: 0.2 E.U./DL
WBC # BLD: 5.9 E9/L (ref 4.5–11.5)
WBC UA: ABNORMAL /HPF (ref 0–5)

## 2018-05-06 PROCEDURE — 99232 SBSQ HOSP IP/OBS MODERATE 35: CPT | Performed by: FAMILY MEDICINE

## 2018-05-06 PROCEDURE — 81001 URINALYSIS AUTO W/SCOPE: CPT

## 2018-05-06 PROCEDURE — 84100 ASSAY OF PHOSPHORUS: CPT

## 2018-05-06 PROCEDURE — 6370000000 HC RX 637 (ALT 250 FOR IP): Performed by: INTERNAL MEDICINE

## 2018-05-06 PROCEDURE — 83550 IRON BINDING TEST: CPT

## 2018-05-06 PROCEDURE — 83540 ASSAY OF IRON: CPT

## 2018-05-06 PROCEDURE — C9113 INJ PANTOPRAZOLE SODIUM, VIA: HCPCS | Performed by: NURSE PRACTITIONER

## 2018-05-06 PROCEDURE — 82962 GLUCOSE BLOOD TEST: CPT

## 2018-05-06 PROCEDURE — 87088 URINE BACTERIA CULTURE: CPT

## 2018-05-06 PROCEDURE — 6360000002 HC RX W HCPCS: Performed by: NURSE PRACTITIONER

## 2018-05-06 PROCEDURE — 82977 ASSAY OF GGT: CPT

## 2018-05-06 PROCEDURE — 85025 COMPLETE CBC W/AUTO DIFF WBC: CPT

## 2018-05-06 PROCEDURE — 83993 ASSAY FOR CALPROTECTIN FECAL: CPT

## 2018-05-06 PROCEDURE — 93005 ELECTROCARDIOGRAM TRACING: CPT | Performed by: STUDENT IN AN ORGANIZED HEALTH CARE EDUCATION/TRAINING PROGRAM

## 2018-05-06 PROCEDURE — 2060000000 HC ICU INTERMEDIATE R&B

## 2018-05-06 PROCEDURE — 6370000000 HC RX 637 (ALT 250 FOR IP): Performed by: FAMILY MEDICINE

## 2018-05-06 PROCEDURE — 6360000002 HC RX W HCPCS: Performed by: FAMILY MEDICINE

## 2018-05-06 PROCEDURE — 2580000003 HC RX 258: Performed by: NURSE PRACTITIONER

## 2018-05-06 PROCEDURE — 80053 COMPREHEN METABOLIC PANEL: CPT

## 2018-05-06 PROCEDURE — 2580000003 HC RX 258: Performed by: FAMILY MEDICINE

## 2018-05-06 PROCEDURE — 82550 ASSAY OF CK (CPK): CPT

## 2018-05-06 PROCEDURE — 76705 ECHO EXAM OF ABDOMEN: CPT

## 2018-05-06 PROCEDURE — 83036 HEMOGLOBIN GLYCOSYLATED A1C: CPT

## 2018-05-06 PROCEDURE — 93975 VASCULAR STUDY: CPT

## 2018-05-06 PROCEDURE — 36415 COLL VENOUS BLD VENIPUNCTURE: CPT

## 2018-05-06 PROCEDURE — 1200000000 HC SEMI PRIVATE

## 2018-05-06 PROCEDURE — 99233 SBSQ HOSP IP/OBS HIGH 50: CPT | Performed by: INTERNAL MEDICINE

## 2018-05-06 PROCEDURE — 83735 ASSAY OF MAGNESIUM: CPT

## 2018-05-06 RX ORDER — PANTOPRAZOLE SODIUM 40 MG/10ML
40 INJECTION, POWDER, LYOPHILIZED, FOR SOLUTION INTRAVENOUS DAILY
Status: DISCONTINUED | OUTPATIENT
Start: 2018-05-06 | End: 2018-05-07

## 2018-05-06 RX ORDER — FAMOTIDINE 20 MG/1
20 TABLET, FILM COATED ORAL NIGHTLY
Status: DISCONTINUED | OUTPATIENT
Start: 2018-05-07 | End: 2018-05-09 | Stop reason: HOSPADM

## 2018-05-06 RX ORDER — 0.9 % SODIUM CHLORIDE 0.9 %
10 VIAL (ML) INJECTION DAILY
Status: DISCONTINUED | OUTPATIENT
Start: 2018-05-06 | End: 2018-05-07

## 2018-05-06 RX ORDER — ATORVASTATIN CALCIUM 40 MG/1
40 TABLET, FILM COATED ORAL NIGHTLY
Status: DISCONTINUED | OUTPATIENT
Start: 2018-05-06 | End: 2018-05-07

## 2018-05-06 RX ORDER — LIDOCAINE HYDROCHLORIDE 10 MG/ML
INJECTION, SOLUTION INFILTRATION; PERINEURAL
Status: DISPENSED
Start: 2018-05-06 | End: 2018-05-07

## 2018-05-06 RX ADMIN — PANTOPRAZOLE SODIUM 40 MG: 40 INJECTION, POWDER, FOR SOLUTION INTRAVENOUS at 15:08

## 2018-05-06 RX ADMIN — PROMETHAZINE HYDROCHLORIDE 12.5 MG: 25 INJECTION INTRAMUSCULAR; INTRAVENOUS at 08:36

## 2018-05-06 RX ADMIN — ISOSORBIDE DINITRATE 20 MG: 10 TABLET ORAL at 20:53

## 2018-05-06 RX ADMIN — FAMOTIDINE 20 MG: 20 TABLET, FILM COATED ORAL at 10:02

## 2018-05-06 RX ADMIN — ASPIRIN 81 MG 81 MG: 81 TABLET ORAL at 10:02

## 2018-05-06 RX ADMIN — AMLODIPINE BESYLATE 5 MG: 5 TABLET ORAL at 10:02

## 2018-05-06 RX ADMIN — ISOSORBIDE DINITRATE 20 MG: 10 TABLET ORAL at 14:59

## 2018-05-06 RX ADMIN — ISOSORBIDE DINITRATE 20 MG: 10 TABLET ORAL at 10:02

## 2018-05-06 RX ADMIN — HEPARIN SODIUM 5000 UNITS: 10000 INJECTION, SOLUTION INTRAVENOUS; SUBCUTANEOUS at 08:41

## 2018-05-06 RX ADMIN — SODIUM CHLORIDE 10 ML: 9 INJECTION INTRAMUSCULAR; INTRAVENOUS; SUBCUTANEOUS at 15:08

## 2018-05-06 RX ADMIN — Medication 10 ML: at 10:00

## 2018-05-06 RX ADMIN — ATORVASTATIN CALCIUM 40 MG: 40 TABLET, FILM COATED ORAL at 20:53

## 2018-05-06 RX ADMIN — HYDRALAZINE HYDROCHLORIDE 25 MG: 25 TABLET ORAL at 22:18

## 2018-05-06 RX ADMIN — INSULIN LISPRO 1 UNITS: 100 INJECTION, SOLUTION INTRAVENOUS; SUBCUTANEOUS at 20:53

## 2018-05-06 RX ADMIN — Medication 10 ML: at 21:30

## 2018-05-06 RX ADMIN — HYDRALAZINE HYDROCHLORIDE 25 MG: 25 TABLET ORAL at 14:59

## 2018-05-06 RX ADMIN — METOPROLOL SUCCINATE 50 MG: 50 TABLET, FILM COATED, EXTENDED RELEASE ORAL at 20:53

## 2018-05-06 ASSESSMENT — PAIN SCALES - GENERAL: PAINLEVEL_OUTOF10: 0

## 2018-05-07 ENCOUNTER — APPOINTMENT (OUTPATIENT)
Dept: GENERAL RADIOLOGY | Age: 68
DRG: 194 | End: 2018-05-07
Payer: MEDICAID

## 2018-05-07 ENCOUNTER — APPOINTMENT (OUTPATIENT)
Dept: ULTRASOUND IMAGING | Age: 68
DRG: 194 | End: 2018-05-07
Payer: MEDICAID

## 2018-05-07 PROBLEM — N18.6 ESRD ON DIALYSIS (HCC): Status: RESOLVED | Noted: 2018-02-21 | Resolved: 2018-05-07

## 2018-05-07 PROBLEM — Z99.2 ESRD ON DIALYSIS (HCC): Status: RESOLVED | Noted: 2018-02-21 | Resolved: 2018-05-07

## 2018-05-07 LAB
ALBUMIN FLUID: 0.9 G/DL
ALBUMIN SERPL-MCNC: 2.9 G/DL (ref 3.5–5.2)
ALP BLD-CCNC: 104 U/L (ref 40–129)
ALT SERPL-CCNC: 856 U/L (ref 0–40)
ANION GAP SERPL CALCULATED.3IONS-SCNC: 12 MMOL/L (ref 7–16)
APPEARANCE FLUID: NORMAL
AST SERPL-CCNC: 691 U/L (ref 0–39)
BASOPHILS ABSOLUTE: 0.04 E9/L (ref 0–0.2)
BASOPHILS RELATIVE PERCENT: 0.7 % (ref 0–2)
BILIRUB SERPL-MCNC: 1.4 MG/DL (ref 0–1.2)
BUN BLDV-MCNC: 44 MG/DL (ref 8–23)
CALCIUM SERPL-MCNC: 8 MG/DL (ref 8.6–10.2)
CEA,FLUID: 0.5 NG/ML
CELL COUNT FLUID TYPE: NORMAL
CHLORIDE BLD-SCNC: 95 MMOL/L (ref 98–107)
CHOLESTEROL FLUID: 23 MG/DL
CHOLESTEROL, TOTAL: 128 MG/DL (ref 0–199)
CO2: 26 MMOL/L (ref 22–29)
COLOR FLUID: YELLOW
CREAT SERPL-MCNC: 5.3 MG/DL (ref 0.7–1.2)
EKG ATRIAL RATE: 72 BPM
EKG P AXIS: 69 DEGREES
EKG P-R INTERVAL: 214 MS
EKG Q-T INTERVAL: 494 MS
EKG QRS DURATION: 128 MS
EKG QTC CALCULATION (BAZETT): 540 MS
EKG R AXIS: -80 DEGREES
EKG T AXIS: 75 DEGREES
EKG VENTRICULAR RATE: 72 BPM
EOSINOPHILS ABSOLUTE: 0.14 E9/L (ref 0.05–0.5)
EOSINOPHILS RELATIVE PERCENT: 2.3 % (ref 0–6)
FLUID TYPE: NORMAL
FLUID TYPE: NORMAL
FOLATE: 11.4 NG/ML (ref 4.8–24.2)
GFR AFRICAN AMERICAN: 13
GFR NON-AFRICAN AMERICAN: 11 ML/MIN/1.73
GLUCOSE BLD-MCNC: 145 MG/DL (ref 74–109)
GLUCOSE, FLUID: 176 MG/DL
HAV IGM SER IA-ACNC: NORMAL
HCT VFR BLD CALC: 29 % (ref 37–54)
HDLC SERPL-MCNC: 36 MG/DL
HEMOGLOBIN: 9.5 G/DL (ref 12.5–16.5)
HEPATITIS B CORE IGM ANTIBODY: NORMAL
HEPATITIS B SURFACE ANTIGEN INTERPRETATION: NORMAL
HEPATITIS C ANTIBODY INTERPRETATION: NORMAL
IMMATURE GRANULOCYTES #: 0.04 E9/L
IMMATURE GRANULOCYTES %: 0.7 % (ref 0–5)
INR BLD: 1.8
LD, FLUID: 84 U/L
LDL CHOLESTEROL CALCULATED: 82 MG/DL (ref 0–99)
LYMPHOCYTES ABSOLUTE: 0.9 E9/L (ref 1.5–4)
LYMPHOCYTES RELATIVE PERCENT: 15 % (ref 20–42)
MAGNESIUM: 2.1 MG/DL (ref 1.6–2.6)
MCH RBC QN AUTO: 32.2 PG (ref 26–35)
MCHC RBC AUTO-ENTMCNC: 32.8 % (ref 32–34.5)
MCV RBC AUTO: 98.3 FL (ref 80–99.9)
METER GLUCOSE: 114 MG/DL (ref 70–110)
METER GLUCOSE: 195 MG/DL (ref 70–110)
METER GLUCOSE: 255 MG/DL (ref 70–110)
MONOCYTE, FLUID: 94 %
MONOCYTES ABSOLUTE: 0.6 E9/L (ref 0.1–0.95)
MONOCYTES RELATIVE PERCENT: 10 % (ref 2–12)
NEUTROPHIL, FLUID: 6 %
NEUTROPHILS ABSOLUTE: 4.28 E9/L (ref 1.8–7.3)
NEUTROPHILS RELATIVE PERCENT: 71.3 % (ref 43–80)
NUCLEATED CELLS FLUID: 321 /UL
PDW BLD-RTO: 14.3 FL (ref 11.5–15)
PHOSPHORUS: 4.2 MG/DL (ref 2.5–4.5)
PLATELET # BLD: 150 E9/L (ref 130–450)
PMV BLD AUTO: 13.1 FL (ref 7–12)
POTASSIUM REFLEX MAGNESIUM: 4.4 MMOL/L (ref 3.5–5)
PROTEIN FLUID: 1.5 G/DL
PROTHROMBIN TIME: 21 SEC (ref 9.3–12.4)
RBC # BLD: 2.95 E12/L (ref 3.8–5.8)
RBC FLUID: <2000 /UL
SODIUM BLD-SCNC: 133 MMOL/L (ref 132–146)
TOTAL PROTEIN: 6 G/DL (ref 6.4–8.3)
TRIGL SERPL-MCNC: 52 MG/DL (ref 0–149)
VITAMIN B-12: 1208 PG/ML (ref 211–946)
VLDLC SERPL CALC-MCNC: 10 MG/DL
WBC # BLD: 6 E9/L (ref 4.5–11.5)

## 2018-05-07 PROCEDURE — 83735 ASSAY OF MAGNESIUM: CPT

## 2018-05-07 PROCEDURE — 87425 ROTAVIRUS AG IA: CPT

## 2018-05-07 PROCEDURE — C9113 INJ PANTOPRAZOLE SODIUM, VIA: HCPCS | Performed by: FAMILY MEDICINE

## 2018-05-07 PROCEDURE — 2580000003 HC RX 258: Performed by: FAMILY MEDICINE

## 2018-05-07 PROCEDURE — 0W9B3ZZ DRAINAGE OF LEFT PLEURAL CAVITY, PERCUTANEOUS APPROACH: ICD-10-PCS | Performed by: INTERNAL MEDICINE

## 2018-05-07 PROCEDURE — 1200000000 HC SEMI PRIVATE

## 2018-05-07 PROCEDURE — 82378 CARCINOEMBRYONIC ANTIGEN: CPT

## 2018-05-07 PROCEDURE — 6370000000 HC RX 637 (ALT 250 FOR IP): Performed by: NURSE PRACTITIONER

## 2018-05-07 PROCEDURE — 89051 BODY FLUID CELL COUNT: CPT

## 2018-05-07 PROCEDURE — 84999 UNLISTED CHEMISTRY PROCEDURE: CPT

## 2018-05-07 PROCEDURE — 87329 GIARDIA AG IA: CPT

## 2018-05-07 PROCEDURE — 84100 ASSAY OF PHOSPHORUS: CPT

## 2018-05-07 PROCEDURE — 83615 LACTATE (LD) (LDH) ENZYME: CPT

## 2018-05-07 PROCEDURE — 87070 CULTURE OTHR SPECIMN AEROBIC: CPT

## 2018-05-07 PROCEDURE — 87045 FECES CULTURE AEROBIC BACT: CPT

## 2018-05-07 PROCEDURE — 84157 ASSAY OF PROTEIN OTHER: CPT

## 2018-05-07 PROCEDURE — 87328 CRYPTOSPORIDIUM AG IA: CPT

## 2018-05-07 PROCEDURE — 6360000002 HC RX W HCPCS: Performed by: FAMILY MEDICINE

## 2018-05-07 PROCEDURE — 82040 ASSAY OF SERUM ALBUMIN: CPT

## 2018-05-07 PROCEDURE — 82746 ASSAY OF FOLIC ACID SERUM: CPT

## 2018-05-07 PROCEDURE — 6370000000 HC RX 637 (ALT 250 FOR IP): Performed by: FAMILY MEDICINE

## 2018-05-07 PROCEDURE — 32555 ASPIRATE PLEURA W/ IMAGING: CPT

## 2018-05-07 PROCEDURE — 82607 VITAMIN B-12: CPT

## 2018-05-07 PROCEDURE — 88112 CYTOPATH CELL ENHANCE TECH: CPT

## 2018-05-07 PROCEDURE — 82947 ASSAY GLUCOSE BLOOD QUANT: CPT

## 2018-05-07 PROCEDURE — 6370000000 HC RX 637 (ALT 250 FOR IP): Performed by: INTERNAL MEDICINE

## 2018-05-07 PROCEDURE — 6360000002 HC RX W HCPCS: Performed by: INTERNAL MEDICINE

## 2018-05-07 PROCEDURE — 80053 COMPREHEN METABOLIC PANEL: CPT

## 2018-05-07 PROCEDURE — 80061 LIPID PANEL: CPT

## 2018-05-07 PROCEDURE — 87015 SPECIMEN INFECT AGNT CONCNTJ: CPT

## 2018-05-07 PROCEDURE — 87116 MYCOBACTERIA CULTURE: CPT

## 2018-05-07 PROCEDURE — 82962 GLUCOSE BLOOD TEST: CPT

## 2018-05-07 PROCEDURE — 87205 SMEAR GRAM STAIN: CPT

## 2018-05-07 PROCEDURE — 85025 COMPLETE CBC W/AUTO DIFF WBC: CPT

## 2018-05-07 PROCEDURE — 71045 X-RAY EXAM CHEST 1 VIEW: CPT

## 2018-05-07 PROCEDURE — 36415 COLL VENOUS BLD VENIPUNCTURE: CPT

## 2018-05-07 PROCEDURE — 88305 TISSUE EXAM BY PATHOLOGIST: CPT

## 2018-05-07 PROCEDURE — 90935 HEMODIALYSIS ONE EVALUATION: CPT | Performed by: INTERNAL MEDICINE

## 2018-05-07 PROCEDURE — 85610 PROTHROMBIN TIME: CPT

## 2018-05-07 PROCEDURE — 99232 SBSQ HOSP IP/OBS MODERATE 35: CPT | Performed by: FAMILY MEDICINE

## 2018-05-07 RX ORDER — 0.9 % SODIUM CHLORIDE 0.9 %
10 VIAL (ML) INJECTION DAILY
Status: DISCONTINUED | OUTPATIENT
Start: 2018-05-07 | End: 2018-05-09 | Stop reason: HOSPADM

## 2018-05-07 RX ORDER — PANTOPRAZOLE SODIUM 40 MG/10ML
40 INJECTION, POWDER, LYOPHILIZED, FOR SOLUTION INTRAVENOUS DAILY
Status: DISCONTINUED | OUTPATIENT
Start: 2018-05-07 | End: 2018-05-09 | Stop reason: HOSPADM

## 2018-05-07 RX ADMIN — ALTEPLASE 2 MG: 2.2 INJECTION, POWDER, LYOPHILIZED, FOR SOLUTION INTRAVENOUS at 13:43

## 2018-05-07 RX ADMIN — Medication 10 ML: at 09:30

## 2018-05-07 RX ADMIN — Medication 10 ML: at 20:02

## 2018-05-07 RX ADMIN — HEPARIN SODIUM 5000 UNITS: 10000 INJECTION, SOLUTION INTRAVENOUS; SUBCUTANEOUS at 20:00

## 2018-05-07 RX ADMIN — Medication 10 ML: at 17:11

## 2018-05-07 RX ADMIN — METOPROLOL SUCCINATE 50 MG: 50 TABLET, FILM COATED, EXTENDED RELEASE ORAL at 20:00

## 2018-05-07 RX ADMIN — PANTOPRAZOLE SODIUM 40 MG: 40 INJECTION, POWDER, FOR SOLUTION INTRAVENOUS at 10:16

## 2018-05-07 RX ADMIN — HYDRALAZINE HYDROCHLORIDE 25 MG: 25 TABLET ORAL at 06:19

## 2018-05-07 RX ADMIN — ISOSORBIDE DINITRATE 20 MG: 10 TABLET ORAL at 21:31

## 2018-05-07 RX ADMIN — INSULIN LISPRO 2 UNITS: 100 INJECTION, SOLUTION INTRAVENOUS; SUBCUTANEOUS at 20:00

## 2018-05-07 RX ADMIN — HYDRALAZINE HYDROCHLORIDE 5 MG: 20 INJECTION INTRAMUSCULAR; INTRAVENOUS at 17:10

## 2018-05-07 RX ADMIN — FAMOTIDINE 20 MG: 20 TABLET, FILM COATED ORAL at 20:07

## 2018-05-07 RX ADMIN — ASPIRIN 81 MG 81 MG: 81 TABLET ORAL at 10:16

## 2018-05-07 ASSESSMENT — PAIN SCALES - GENERAL
PAINLEVEL_OUTOF10: 0

## 2018-05-08 LAB
ALBUMIN SERPL-MCNC: 2.9 G/DL (ref 3.5–5.2)
ALP BLD-CCNC: 116 U/L (ref 40–129)
ALT SERPL-CCNC: 569 U/L (ref 0–40)
ANION GAP SERPL CALCULATED.3IONS-SCNC: 13 MMOL/L (ref 7–16)
AST SERPL-CCNC: 257 U/L (ref 0–39)
BASOPHILS ABSOLUTE: 0.02 E9/L (ref 0–0.2)
BASOPHILS RELATIVE PERCENT: 0.4 % (ref 0–2)
BILIRUB SERPL-MCNC: 1.2 MG/DL (ref 0–1.2)
BUN BLDV-MCNC: 30 MG/DL (ref 8–23)
CALCIUM SERPL-MCNC: 8.2 MG/DL (ref 8.6–10.2)
CHLORIDE BLD-SCNC: 99 MMOL/L (ref 98–107)
CO2: 26 MMOL/L (ref 22–29)
CREAT SERPL-MCNC: 4 MG/DL (ref 0.7–1.2)
CRYPTOSPORIDIUM ANTIGEN STOOL: NORMAL
EOSINOPHILS ABSOLUTE: 0.1 E9/L (ref 0.05–0.5)
EOSINOPHILS RELATIVE PERCENT: 1.9 % (ref 0–6)
GFR AFRICAN AMERICAN: 18
GFR NON-AFRICAN AMERICAN: 15 ML/MIN/1.73
GIARDIA ANTIGEN STOOL: NORMAL
GLUCOSE BLD-MCNC: 164 MG/DL (ref 74–109)
GRAM STAIN ORDERABLE: NORMAL
HCT VFR BLD CALC: 27.8 % (ref 37–54)
HEMOGLOBIN: 9.1 G/DL (ref 12.5–16.5)
IMMATURE GRANULOCYTES #: 0.01 E9/L
IMMATURE GRANULOCYTES %: 0.2 % (ref 0–5)
LYMPHOCYTES ABSOLUTE: 0.66 E9/L (ref 1.5–4)
LYMPHOCYTES RELATIVE PERCENT: 12.7 % (ref 20–42)
MAGNESIUM: 1.9 MG/DL (ref 1.6–2.6)
MCH RBC QN AUTO: 31.9 PG (ref 26–35)
MCHC RBC AUTO-ENTMCNC: 32.7 % (ref 32–34.5)
MCV RBC AUTO: 97.5 FL (ref 80–99.9)
METER GLUCOSE: 117 MG/DL (ref 70–110)
METER GLUCOSE: 143 MG/DL (ref 70–110)
METER GLUCOSE: 196 MG/DL (ref 70–110)
METER GLUCOSE: 202 MG/DL (ref 70–110)
MONOCYTES ABSOLUTE: 0.58 E9/L (ref 0.1–0.95)
MONOCYTES RELATIVE PERCENT: 11.2 % (ref 2–12)
NEUTROPHILS ABSOLUTE: 3.81 E9/L (ref 1.8–7.3)
NEUTROPHILS RELATIVE PERCENT: 73.6 % (ref 43–80)
PDW BLD-RTO: 14.3 FL (ref 11.5–15)
PHOSPHORUS: 2.6 MG/DL (ref 2.5–4.5)
PLATELET # BLD: 126 E9/L (ref 130–450)
PMV BLD AUTO: 13.6 FL (ref 7–12)
POTASSIUM REFLEX MAGNESIUM: 5 MMOL/L (ref 3.5–5)
RBC # BLD: 2.85 E12/L (ref 3.8–5.8)
ROTAVIRUS ANTIGEN: NORMAL
SODIUM BLD-SCNC: 138 MMOL/L (ref 132–146)
TOTAL PROTEIN: 6.2 G/DL (ref 6.4–8.3)
URINE CULTURE, ROUTINE: NORMAL
WBC # BLD: 5.2 E9/L (ref 4.5–11.5)

## 2018-05-08 PROCEDURE — 84100 ASSAY OF PHOSPHORUS: CPT

## 2018-05-08 PROCEDURE — 6360000002 HC RX W HCPCS: Performed by: FAMILY MEDICINE

## 2018-05-08 PROCEDURE — 83735 ASSAY OF MAGNESIUM: CPT

## 2018-05-08 PROCEDURE — C9113 INJ PANTOPRAZOLE SODIUM, VIA: HCPCS | Performed by: FAMILY MEDICINE

## 2018-05-08 PROCEDURE — 6370000000 HC RX 637 (ALT 250 FOR IP): Performed by: NURSE PRACTITIONER

## 2018-05-08 PROCEDURE — 6370000000 HC RX 637 (ALT 250 FOR IP): Performed by: INTERNAL MEDICINE

## 2018-05-08 PROCEDURE — 99232 SBSQ HOSP IP/OBS MODERATE 35: CPT | Performed by: INTERNAL MEDICINE

## 2018-05-08 PROCEDURE — 80053 COMPREHEN METABOLIC PANEL: CPT

## 2018-05-08 PROCEDURE — 6370000000 HC RX 637 (ALT 250 FOR IP): Performed by: FAMILY MEDICINE

## 2018-05-08 PROCEDURE — 36415 COLL VENOUS BLD VENIPUNCTURE: CPT

## 2018-05-08 PROCEDURE — 99232 SBSQ HOSP IP/OBS MODERATE 35: CPT | Performed by: FAMILY MEDICINE

## 2018-05-08 PROCEDURE — 2580000003 HC RX 258: Performed by: FAMILY MEDICINE

## 2018-05-08 PROCEDURE — 82962 GLUCOSE BLOOD TEST: CPT

## 2018-05-08 PROCEDURE — 1200000000 HC SEMI PRIVATE

## 2018-05-08 PROCEDURE — 85025 COMPLETE CBC W/AUTO DIFF WBC: CPT

## 2018-05-08 RX ORDER — FAMOTIDINE 20 MG/1
20 TABLET, FILM COATED ORAL NIGHTLY PRN
Qty: 30 TABLET | Refills: 1 | Status: SHIPPED | OUTPATIENT
Start: 2018-05-08

## 2018-05-08 RX ORDER — PANTOPRAZOLE SODIUM 40 MG/1
40 TABLET, DELAYED RELEASE ORAL DAILY
Qty: 30 TABLET | Refills: 1 | Status: SHIPPED | OUTPATIENT
Start: 2018-05-08

## 2018-05-08 RX ADMIN — Medication 10 ML: at 20:39

## 2018-05-08 RX ADMIN — ISOSORBIDE DINITRATE 20 MG: 10 TABLET ORAL at 09:31

## 2018-05-08 RX ADMIN — INSULIN LISPRO 4 UNITS: 100 INJECTION, SOLUTION INTRAVENOUS; SUBCUTANEOUS at 08:02

## 2018-05-08 RX ADMIN — PANTOPRAZOLE SODIUM 40 MG: 40 INJECTION, POWDER, FOR SOLUTION INTRAVENOUS at 09:32

## 2018-05-08 RX ADMIN — Medication 10 ML: at 09:32

## 2018-05-08 RX ADMIN — FAMOTIDINE 20 MG: 20 TABLET, FILM COATED ORAL at 20:33

## 2018-05-08 RX ADMIN — ISOSORBIDE DINITRATE 20 MG: 10 TABLET ORAL at 20:33

## 2018-05-08 RX ADMIN — HEPARIN SODIUM 5000 UNITS: 10000 INJECTION, SOLUTION INTRAVENOUS; SUBCUTANEOUS at 20:34

## 2018-05-08 RX ADMIN — METOPROLOL SUCCINATE 50 MG: 50 TABLET, FILM COATED, EXTENDED RELEASE ORAL at 20:33

## 2018-05-08 RX ADMIN — AMLODIPINE BESYLATE 5 MG: 5 TABLET ORAL at 09:32

## 2018-05-08 RX ADMIN — ISOSORBIDE DINITRATE 20 MG: 10 TABLET ORAL at 13:39

## 2018-05-08 RX ADMIN — HEPARIN SODIUM 5000 UNITS: 10000 INJECTION, SOLUTION INTRAVENOUS; SUBCUTANEOUS at 08:02

## 2018-05-08 RX ADMIN — INSULIN LISPRO 2 UNITS: 100 INJECTION, SOLUTION INTRAVENOUS; SUBCUTANEOUS at 11:54

## 2018-05-08 RX ADMIN — INSULIN LISPRO 1 UNITS: 100 INJECTION, SOLUTION INTRAVENOUS; SUBCUTANEOUS at 20:34

## 2018-05-08 RX ADMIN — ASPIRIN 81 MG 81 MG: 81 TABLET ORAL at 09:32

## 2018-05-08 ASSESSMENT — PAIN SCALES - GENERAL
PAINLEVEL_OUTOF10: 0

## 2018-05-09 VITALS
RESPIRATION RATE: 16 BRPM | HEART RATE: 76 BPM | BODY MASS INDEX: 36.92 KG/M2 | OXYGEN SATURATION: 98 % | HEIGHT: 61 IN | WEIGHT: 195.55 LBS | DIASTOLIC BLOOD PRESSURE: 77 MMHG | TEMPERATURE: 98.6 F | SYSTOLIC BLOOD PRESSURE: 160 MMHG

## 2018-05-09 LAB
ALBUMIN SERPL-MCNC: 2.9 G/DL (ref 3.5–5.2)
ALP BLD-CCNC: 131 U/L (ref 40–129)
ALT SERPL-CCNC: 427 U/L (ref 0–40)
ANION GAP SERPL CALCULATED.3IONS-SCNC: 13 MMOL/L (ref 7–16)
AST SERPL-CCNC: 179 U/L (ref 0–39)
BASOPHILS ABSOLUTE: 0.03 E9/L (ref 0–0.2)
BASOPHILS RELATIVE PERCENT: 0.7 % (ref 0–2)
BILIRUB SERPL-MCNC: 0.9 MG/DL (ref 0–1.2)
BODY FLUID CULTURE, STERILE: NORMAL
BUN BLDV-MCNC: 42 MG/DL (ref 8–23)
CALCIUM SERPL-MCNC: 7.9 MG/DL (ref 8.6–10.2)
CHLORIDE BLD-SCNC: 97 MMOL/L (ref 98–107)
CO2: 26 MMOL/L (ref 22–29)
CREAT SERPL-MCNC: 5.4 MG/DL (ref 0.7–1.2)
EOSINOPHILS ABSOLUTE: 0.1 E9/L (ref 0.05–0.5)
EOSINOPHILS RELATIVE PERCENT: 2.4 % (ref 0–6)
GFR AFRICAN AMERICAN: 13
GFR NON-AFRICAN AMERICAN: 11 ML/MIN/1.73
GLUCOSE BLD-MCNC: 246 MG/DL (ref 74–109)
GRAM STAIN RESULT: NORMAL
HCT VFR BLD CALC: 28 % (ref 37–54)
HEMOGLOBIN: 8.9 G/DL (ref 12.5–16.5)
IMMATURE GRANULOCYTES #: 0.02 E9/L
IMMATURE GRANULOCYTES %: 0.5 % (ref 0–5)
LYMPHOCYTES ABSOLUTE: 0.66 E9/L (ref 1.5–4)
LYMPHOCYTES RELATIVE PERCENT: 16.1 % (ref 20–42)
MAGNESIUM: 1.8 MG/DL (ref 1.6–2.6)
MCH RBC QN AUTO: 31.7 PG (ref 26–35)
MCHC RBC AUTO-ENTMCNC: 31.8 % (ref 32–34.5)
MCV RBC AUTO: 99.6 FL (ref 80–99.9)
MONOCYTES ABSOLUTE: 0.47 E9/L (ref 0.1–0.95)
MONOCYTES RELATIVE PERCENT: 11.5 % (ref 2–12)
NEUTROPHILS ABSOLUTE: 2.82 E9/L (ref 1.8–7.3)
NEUTROPHILS RELATIVE PERCENT: 68.8 % (ref 43–80)
PDW BLD-RTO: 14.3 FL (ref 11.5–15)
PHOSPHORUS: 2.5 MG/DL (ref 2.5–4.5)
PLATELET # BLD: 123 E9/L (ref 130–450)
PMV BLD AUTO: 13.8 FL (ref 7–12)
POTASSIUM REFLEX MAGNESIUM: 4.8 MMOL/L (ref 3.5–5)
RBC # BLD: 2.81 E12/L (ref 3.8–5.8)
SODIUM BLD-SCNC: 136 MMOL/L (ref 132–146)
TOTAL PROTEIN: 6 G/DL (ref 6.4–8.3)
WBC # BLD: 4.1 E9/L (ref 4.5–11.5)

## 2018-05-09 PROCEDURE — 2580000003 HC RX 258: Performed by: FAMILY MEDICINE

## 2018-05-09 PROCEDURE — 6360000002 HC RX W HCPCS: Performed by: FAMILY MEDICINE

## 2018-05-09 PROCEDURE — 6370000000 HC RX 637 (ALT 250 FOR IP): Performed by: FAMILY MEDICINE

## 2018-05-09 PROCEDURE — 90935 HEMODIALYSIS ONE EVALUATION: CPT | Performed by: INTERNAL MEDICINE

## 2018-05-09 PROCEDURE — 83735 ASSAY OF MAGNESIUM: CPT

## 2018-05-09 PROCEDURE — 36415 COLL VENOUS BLD VENIPUNCTURE: CPT

## 2018-05-09 PROCEDURE — 80053 COMPREHEN METABOLIC PANEL: CPT

## 2018-05-09 PROCEDURE — 84100 ASSAY OF PHOSPHORUS: CPT

## 2018-05-09 PROCEDURE — C9113 INJ PANTOPRAZOLE SODIUM, VIA: HCPCS | Performed by: FAMILY MEDICINE

## 2018-05-09 PROCEDURE — 99232 SBSQ HOSP IP/OBS MODERATE 35: CPT | Performed by: INTERNAL MEDICINE

## 2018-05-09 PROCEDURE — 6370000000 HC RX 637 (ALT 250 FOR IP): Performed by: INTERNAL MEDICINE

## 2018-05-09 PROCEDURE — 85025 COMPLETE CBC W/AUTO DIFF WBC: CPT

## 2018-05-09 PROCEDURE — 99239 HOSP IP/OBS DSCHRG MGMT >30: CPT | Performed by: FAMILY MEDICINE

## 2018-05-09 RX ORDER — ISOSORBIDE DINITRATE 20 MG/1
20 TABLET ORAL 3 TIMES DAILY
Qty: 90 TABLET | Refills: 3 | Status: SHIPPED | OUTPATIENT
Start: 2018-05-09

## 2018-05-09 RX ORDER — AMLODIPINE BESYLATE 10 MG/1
10 TABLET ORAL EVERY MORNING
Qty: 30 TABLET | Refills: 3 | Status: SHIPPED | OUTPATIENT
Start: 2018-05-09 | End: 2018-09-06

## 2018-05-09 RX ADMIN — INSULIN LISPRO 4 UNITS: 100 INJECTION, SOLUTION INTRAVENOUS; SUBCUTANEOUS at 08:13

## 2018-05-09 RX ADMIN — AMLODIPINE BESYLATE 5 MG: 5 TABLET ORAL at 08:14

## 2018-05-09 RX ADMIN — ASPIRIN 81 MG 81 MG: 81 TABLET ORAL at 08:15

## 2018-05-09 RX ADMIN — PANTOPRAZOLE SODIUM 40 MG: 40 INJECTION, POWDER, FOR SOLUTION INTRAVENOUS at 08:15

## 2018-05-09 RX ADMIN — Medication 10 ML: at 08:15

## 2018-05-09 RX ADMIN — ISOSORBIDE DINITRATE 20 MG: 10 TABLET ORAL at 08:14

## 2018-05-09 RX ADMIN — ISOSORBIDE DINITRATE 20 MG: 10 TABLET ORAL at 13:49

## 2018-05-09 RX ADMIN — HEPARIN SODIUM 5000 UNITS: 10000 INJECTION, SOLUTION INTRAVENOUS; SUBCUTANEOUS at 08:13

## 2018-05-09 RX ADMIN — SODIUM CHLORIDE 10 ML: 9 INJECTION INTRAMUSCULAR; INTRAVENOUS; SUBCUTANEOUS at 08:15

## 2018-05-09 ASSESSMENT — PAIN SCALES - GENERAL
PAINLEVEL_OUTOF10: 0

## 2018-05-10 LAB — CULTURE, STOOL: NORMAL

## 2018-05-11 LAB
Lab: NORMAL
REPORT: NORMAL
THIS TEST SENT TO: NORMAL

## 2018-05-15 ENCOUNTER — OFFICE VISIT (OUTPATIENT)
Dept: FAMILY MEDICINE CLINIC | Age: 68
End: 2018-05-15
Payer: MEDICAID

## 2018-05-15 ENCOUNTER — HOSPITAL ENCOUNTER (OUTPATIENT)
Age: 68
Discharge: HOME | End: 2018-05-17
Payer: MEDICAID

## 2018-05-15 VITALS
HEART RATE: 77 BPM | DIASTOLIC BLOOD PRESSURE: 75 MMHG | RESPIRATION RATE: 18 BRPM | HEIGHT: 62 IN | BODY MASS INDEX: 38.09 KG/M2 | SYSTOLIC BLOOD PRESSURE: 134 MMHG | OXYGEN SATURATION: 97 % | WEIGHT: 207 LBS

## 2018-05-15 DIAGNOSIS — R07.9 CHEST PAIN, UNSPECIFIED TYPE: ICD-10-CM

## 2018-05-15 DIAGNOSIS — Z23 NEED FOR PNEUMOCOCCAL VACCINATION: ICD-10-CM

## 2018-05-15 DIAGNOSIS — Z99.2 END STAGE RENAL DISEASE ON DIALYSIS (HCC): ICD-10-CM

## 2018-05-15 DIAGNOSIS — R10.13 EPIGASTRIC PAIN: ICD-10-CM

## 2018-05-15 DIAGNOSIS — R05.9 COUGH: ICD-10-CM

## 2018-05-15 DIAGNOSIS — R79.89 ELEVATED LIVER FUNCTION TESTS: Primary | ICD-10-CM

## 2018-05-15 DIAGNOSIS — N18.6 END STAGE RENAL DISEASE ON DIALYSIS (HCC): ICD-10-CM

## 2018-05-15 DIAGNOSIS — R79.89 ELEVATED LIVER FUNCTION TESTS: ICD-10-CM

## 2018-05-15 DIAGNOSIS — I10 ESSENTIAL HYPERTENSION: Chronic | ICD-10-CM

## 2018-05-15 LAB
ALBUMIN SERPL-MCNC: 3.4 G/DL (ref 3.5–5.2)
ALP BLD-CCNC: 159 U/L (ref 40–129)
ALT SERPL-CCNC: 94 U/L (ref 0–40)
AST SERPL-CCNC: 27 U/L (ref 0–39)
BILIRUB SERPL-MCNC: 1 MG/DL (ref 0–1.2)
BILIRUBIN DIRECT: 0.5 MG/DL (ref 0–0.3)
BILIRUBIN, INDIRECT: 0.5 MG/DL (ref 0–1)
TOTAL PROTEIN: 6.8 G/DL (ref 6.4–8.3)

## 2018-05-15 PROCEDURE — 3017F COLORECTAL CA SCREEN DOC REV: CPT | Performed by: FAMILY MEDICINE

## 2018-05-15 PROCEDURE — 4040F PNEUMOC VAC/ADMIN/RCVD: CPT | Performed by: FAMILY MEDICINE

## 2018-05-15 PROCEDURE — 90670 PCV13 VACCINE IM: CPT | Performed by: FAMILY MEDICINE

## 2018-05-15 PROCEDURE — 90471 IMMUNIZATION ADMIN: CPT | Performed by: FAMILY MEDICINE

## 2018-05-15 PROCEDURE — 1111F DSCHRG MED/CURRENT MED MERGE: CPT | Performed by: FAMILY MEDICINE

## 2018-05-15 PROCEDURE — G8428 CUR MEDS NOT DOCUMENT: HCPCS | Performed by: FAMILY MEDICINE

## 2018-05-15 PROCEDURE — 80076 HEPATIC FUNCTION PANEL: CPT

## 2018-05-15 PROCEDURE — 1036F TOBACCO NON-USER: CPT | Performed by: FAMILY MEDICINE

## 2018-05-15 PROCEDURE — 1123F ACP DISCUSS/DSCN MKR DOCD: CPT | Performed by: FAMILY MEDICINE

## 2018-05-15 PROCEDURE — G8417 CALC BMI ABV UP PARAM F/U: HCPCS | Performed by: FAMILY MEDICINE

## 2018-05-15 PROCEDURE — 99213 OFFICE O/P EST LOW 20 MIN: CPT | Performed by: FAMILY MEDICINE

## 2018-05-15 ASSESSMENT — PATIENT HEALTH QUESTIONNAIRE - PHQ9
1. LITTLE INTEREST OR PLEASURE IN DOING THINGS: 0
2. FEELING DOWN, DEPRESSED OR HOPELESS: 0
SUM OF ALL RESPONSES TO PHQ QUESTIONS 1-9: 0
SUM OF ALL RESPONSES TO PHQ9 QUESTIONS 1 & 2: 0

## 2018-05-30 LAB
EKG ATRIAL RATE: 81 BPM
EKG P AXIS: 71 DEGREES
EKG P-R INTERVAL: 232 MS
EKG Q-T INTERVAL: 456 MS
EKG QRS DURATION: 128 MS
EKG QTC CALCULATION (BAZETT): 529 MS
EKG R AXIS: -77 DEGREES
EKG T AXIS: 55 DEGREES
EKG VENTRICULAR RATE: 81 BPM

## 2018-05-31 ENCOUNTER — OFFICE VISIT (OUTPATIENT)
Dept: CARDIOLOGY CLINIC | Age: 68
End: 2018-05-31
Payer: MEDICAID

## 2018-05-31 VITALS
HEIGHT: 62 IN | WEIGHT: 201.8 LBS | SYSTOLIC BLOOD PRESSURE: 114 MMHG | DIASTOLIC BLOOD PRESSURE: 52 MMHG | HEART RATE: 70 BPM | RESPIRATION RATE: 16 BRPM | BODY MASS INDEX: 37.13 KG/M2

## 2018-05-31 DIAGNOSIS — E78.00 PURE HYPERCHOLESTEROLEMIA: ICD-10-CM

## 2018-05-31 DIAGNOSIS — Z99.2 TYPE 2 DIABETES MELLITUS WITH CHRONIC KIDNEY DISEASE ON CHRONIC DIALYSIS, UNSPECIFIED LONG TERM INSULIN USE STATUS: ICD-10-CM

## 2018-05-31 DIAGNOSIS — I48.0 PAROXYSMAL ATRIAL FIBRILLATION (HCC): ICD-10-CM

## 2018-05-31 DIAGNOSIS — N18.6 TYPE 2 DIABETES MELLITUS WITH CHRONIC KIDNEY DISEASE ON CHRONIC DIALYSIS, UNSPECIFIED LONG TERM INSULIN USE STATUS: ICD-10-CM

## 2018-05-31 DIAGNOSIS — I10 ESSENTIAL HYPERTENSION: Chronic | ICD-10-CM

## 2018-05-31 DIAGNOSIS — E11.22 TYPE 2 DIABETES MELLITUS WITH CHRONIC KIDNEY DISEASE ON CHRONIC DIALYSIS, UNSPECIFIED LONG TERM INSULIN USE STATUS: ICD-10-CM

## 2018-05-31 DIAGNOSIS — I44.0 FIRST DEGREE ATRIOVENTRICULAR BLOCK: ICD-10-CM

## 2018-05-31 DIAGNOSIS — I42.8 NONISCHEMIC CARDIOMYOPATHY (HCC): Primary | ICD-10-CM

## 2018-05-31 DIAGNOSIS — I31.39 PERICARDIAL EFFUSION: ICD-10-CM

## 2018-05-31 PROCEDURE — 93000 ELECTROCARDIOGRAM COMPLETE: CPT | Performed by: INTERNAL MEDICINE

## 2018-05-31 PROCEDURE — 99215 OFFICE O/P EST HI 40 MIN: CPT | Performed by: INTERNAL MEDICINE

## 2018-05-31 PROCEDURE — 2022F DILAT RTA XM EVC RTNOPTHY: CPT | Performed by: INTERNAL MEDICINE

## 2018-05-31 PROCEDURE — 1111F DSCHRG MED/CURRENT MED MERGE: CPT | Performed by: INTERNAL MEDICINE

## 2018-05-31 PROCEDURE — G8417 CALC BMI ABV UP PARAM F/U: HCPCS | Performed by: INTERNAL MEDICINE

## 2018-05-31 PROCEDURE — 3017F COLORECTAL CA SCREEN DOC REV: CPT | Performed by: INTERNAL MEDICINE

## 2018-05-31 PROCEDURE — 3044F HG A1C LEVEL LT 7.0%: CPT | Performed by: INTERNAL MEDICINE

## 2018-05-31 PROCEDURE — G8427 DOCREV CUR MEDS BY ELIG CLIN: HCPCS | Performed by: INTERNAL MEDICINE

## 2018-05-31 PROCEDURE — 1036F TOBACCO NON-USER: CPT | Performed by: INTERNAL MEDICINE

## 2018-05-31 PROCEDURE — 1123F ACP DISCUSS/DSCN MKR DOCD: CPT | Performed by: INTERNAL MEDICINE

## 2018-05-31 PROCEDURE — 4040F PNEUMOC VAC/ADMIN/RCVD: CPT | Performed by: INTERNAL MEDICINE

## 2018-06-26 LAB
AFB CULTURE (MYCOBACTERIA): NORMAL
AFB SMEAR: NORMAL

## 2018-07-19 ENCOUNTER — HOSPITAL ENCOUNTER (OUTPATIENT)
Dept: CARDIOLOGY | Age: 68
Discharge: HOME | End: 2018-07-19
Payer: MEDICAID

## 2018-07-19 ENCOUNTER — TELEPHONE (OUTPATIENT)
Dept: CARDIOLOGY CLINIC | Age: 68
End: 2018-07-19

## 2018-07-19 DIAGNOSIS — I42.8 NONISCHEMIC CARDIOMYOPATHY (HCC): ICD-10-CM

## 2018-07-19 DIAGNOSIS — I48.0 PAROXYSMAL ATRIAL FIBRILLATION (HCC): ICD-10-CM

## 2018-07-19 LAB
LV EF: 58 %
LVEF MODALITY: NORMAL

## 2018-07-19 PROCEDURE — 93306 TTE W/DOPPLER COMPLETE: CPT

## 2018-07-23 ENCOUNTER — HOSPITAL ENCOUNTER (EMERGENCY)
Age: 68
Discharge: HOME | End: 2018-07-24
Attending: EMERGENCY MEDICINE
Payer: MEDICAID

## 2018-07-23 ENCOUNTER — APPOINTMENT (OUTPATIENT)
Dept: GENERAL RADIOLOGY | Age: 68
End: 2018-07-23
Payer: MEDICAID

## 2018-07-23 DIAGNOSIS — N18.9 CHRONIC RENAL FAILURE, UNSPECIFIED CKD STAGE: ICD-10-CM

## 2018-07-23 DIAGNOSIS — J20.9 ACUTE BRONCHITIS, UNSPECIFIED ORGANISM: Primary | ICD-10-CM

## 2018-07-23 LAB
EKG ATRIAL RATE: 82 BPM
EKG P AXIS: 64 DEGREES
EKG P-R INTERVAL: 278 MS
EKG Q-T INTERVAL: 420 MS
EKG QRS DURATION: 134 MS
EKG QTC CALCULATION (BAZETT): 490 MS
EKG R AXIS: -78 DEGREES
EKG T AXIS: 43 DEGREES
EKG VENTRICULAR RATE: 82 BPM
HCT VFR BLD CALC: 29.9 % (ref 37–54)
HEMOGLOBIN: 10.1 G/DL (ref 12.5–16.5)
MCH RBC QN AUTO: 32.2 PG (ref 26–35)
MCHC RBC AUTO-ENTMCNC: 33.8 % (ref 32–34.5)
MCV RBC AUTO: 95.2 FL (ref 80–99.9)
PDW BLD-RTO: 15.1 FL (ref 11.5–15)
PLATELET # BLD: 155 E9/L (ref 130–450)
PMV BLD AUTO: 12.8 FL (ref 7–12)
RBC # BLD: 3.14 E12/L (ref 3.8–5.8)
WBC # BLD: 6 E9/L (ref 4.5–11.5)

## 2018-07-23 PROCEDURE — 71046 X-RAY EXAM CHEST 2 VIEWS: CPT

## 2018-07-23 PROCEDURE — 36415 COLL VENOUS BLD VENIPUNCTURE: CPT

## 2018-07-23 PROCEDURE — 6360000002 HC RX W HCPCS: Performed by: EMERGENCY MEDICINE

## 2018-07-23 PROCEDURE — 99285 EMERGENCY DEPT VISIT HI MDM: CPT

## 2018-07-23 PROCEDURE — 80048 BASIC METABOLIC PNL TOTAL CA: CPT

## 2018-07-23 PROCEDURE — 85027 COMPLETE CBC AUTOMATED: CPT

## 2018-07-23 PROCEDURE — 96374 THER/PROPH/DIAG INJ IV PUSH: CPT

## 2018-07-23 PROCEDURE — 6370000000 HC RX 637 (ALT 250 FOR IP): Performed by: EMERGENCY MEDICINE

## 2018-07-23 RX ORDER — GUAIFENESIN/DEXTROMETHORPHAN 100-10MG/5
15 SYRUP ORAL ONCE
Status: COMPLETED | OUTPATIENT
Start: 2018-07-23 | End: 2018-07-23

## 2018-07-23 RX ORDER — ONDANSETRON 2 MG/ML
4 INJECTION INTRAMUSCULAR; INTRAVENOUS ONCE
Status: COMPLETED | OUTPATIENT
Start: 2018-07-23 | End: 2018-07-23

## 2018-07-23 RX ORDER — ACETAMINOPHEN 500 MG
1000 TABLET ORAL ONCE
Status: COMPLETED | OUTPATIENT
Start: 2018-07-23 | End: 2018-07-23

## 2018-07-23 RX ADMIN — ACETAMINOPHEN 1000 MG: 500 TABLET ORAL at 23:15

## 2018-07-23 RX ADMIN — ONDANSETRON 4 MG: 2 INJECTION INTRAMUSCULAR; INTRAVENOUS at 23:15

## 2018-07-23 RX ADMIN — GUAIFENESIN AND DEXTROMETHORPHAN 15 ML: 100; 10 SYRUP ORAL at 23:14

## 2018-07-23 ASSESSMENT — PAIN SCALES - GENERAL: PAINLEVEL_OUTOF10: 5

## 2018-07-23 ASSESSMENT — PAIN DESCRIPTION - ORIENTATION: ORIENTATION: MID;LEFT

## 2018-07-23 ASSESSMENT — PAIN DESCRIPTION - LOCATION: LOCATION: CHEST

## 2018-07-23 ASSESSMENT — PAIN DESCRIPTION - PAIN TYPE: TYPE: ACUTE PAIN

## 2018-07-24 VITALS
BODY MASS INDEX: 28.49 KG/M2 | HEIGHT: 66 IN | RESPIRATION RATE: 14 BRPM | TEMPERATURE: 99.6 F | SYSTOLIC BLOOD PRESSURE: 126 MMHG | WEIGHT: 177.25 LBS | DIASTOLIC BLOOD PRESSURE: 74 MMHG | HEART RATE: 78 BPM | OXYGEN SATURATION: 100 %

## 2018-07-24 LAB
ANION GAP SERPL CALCULATED.3IONS-SCNC: 16 MMOL/L (ref 7–16)
BUN BLDV-MCNC: 45 MG/DL (ref 8–23)
CALCIUM SERPL-MCNC: 9.9 MG/DL (ref 8.6–10.2)
CHLORIDE BLD-SCNC: 90 MMOL/L (ref 98–107)
CO2: 27 MMOL/L (ref 22–29)
CREAT SERPL-MCNC: 7.1 MG/DL (ref 0.7–1.2)
GFR AFRICAN AMERICAN: 9
GFR NON-AFRICAN AMERICAN: 8 ML/MIN/1.73
GLUCOSE BLD-MCNC: 124 MG/DL (ref 74–109)
POTASSIUM SERPL-SCNC: 5.6 MMOL/L (ref 3.5–5)
SODIUM BLD-SCNC: 133 MMOL/L (ref 132–146)

## 2018-07-24 PROCEDURE — 6370000000 HC RX 637 (ALT 250 FOR IP): Performed by: EMERGENCY MEDICINE

## 2018-07-24 RX ORDER — DOXYCYCLINE HYCLATE 100 MG/1
100 CAPSULE ORAL ONCE
Status: COMPLETED | OUTPATIENT
Start: 2018-07-24 | End: 2018-07-24

## 2018-07-24 RX ORDER — DOXYCYCLINE HYCLATE 100 MG
100 TABLET ORAL 2 TIMES DAILY
Qty: 20 TABLET | Refills: 0 | Status: SHIPPED | OUTPATIENT
Start: 2018-07-24 | End: 2018-08-03

## 2018-07-24 RX ORDER — GUAIFENESIN AND DEXTROMETHORPHAN HYDROBROMIDE 1200; 60 MG/1; MG/1
1 TABLET, EXTENDED RELEASE ORAL EVERY 12 HOURS PRN
Qty: 14 TABLET | Refills: 0 | Status: SHIPPED | OUTPATIENT
Start: 2018-07-24 | End: 2018-07-31

## 2018-07-24 RX ADMIN — DOXYCYCLINE HYCLATE 100 MG: 100 CAPSULE, GELATIN COATED ORAL at 00:58

## 2018-07-24 NOTE — ED PROVIDER NOTES
laboratory and radiology results have been personally reviewed by myself   LABS:  Results for orders placed or performed during the hospital encounter of 07/23/18   CBC   Result Value Ref Range    WBC 6.0 4.5 - 11.5 E9/L    RBC 3.14 (L) 3.80 - 5.80 E12/L    Hemoglobin 10.1 (L) 12.5 - 16.5 g/dL    Hematocrit 29.9 (L) 37.0 - 54.0 %    MCV 95.2 80.0 - 99.9 fL    MCH 32.2 26.0 - 35.0 pg    MCHC 33.8 32.0 - 34.5 %    RDW 15.1 (H) 11.5 - 15.0 fL    Platelets 593 273 - 424 E9/L    MPV 12.8 (H) 7.0 - 12.0 fL   Basic Metabolic Panel   Result Value Ref Range    Sodium 133 132 - 146 mmol/L    Potassium 5.6 (H) 3.5 - 5.0 mmol/L    Chloride 90 (L) 98 - 107 mmol/L    CO2 27 22 - 29 mmol/L    Anion Gap 16 7 - 16 mmol/L    Glucose 124 (H) 74 - 109 mg/dL    BUN 45 (H) 8 - 23 mg/dL    CREATININE 7.1 (H) 0.7 - 1.2 mg/dL    GFR Non-African American 8 >=60 mL/min/1.73    GFR African American 9     Calcium 9.9 8.6 - 10.2 mg/dL       RADIOLOGY:  Interpreted by Radiologist.  XR CHEST STANDARD (2 VW)   Final Result   Small bilateral pleural effusions.          ------------------------- NURSING NOTES AND VITALS REVIEWED ---------------------------   The nursing notes within the ED encounter and vital signs as below have been reviewed. /74   Pulse 78   Temp 99.6 °F (37.6 °C) (Oral)   Resp 14   Ht 5' 6\" (1.676 m)   Wt 177 lb 4 oz (80.4 kg)   SpO2 100%   BMI 28.61 kg/m²   Oxygen Saturation Interpretation: Normal      ---------------------------------------------------PHYSICAL EXAM--------------------------------------      Constitutional/General: Alert and oriented x3, well appearing, non toxic in NAD  Head: NC/AT  Eyes: PERRL, EOMI  Nose:  Nares patent. No congestion or discharge noted. Ears:  TM's intact without erythema or perforation. External canal without swelling  Mouth: Oropharynx clear, handling secretions, no trismus  Neck: Supple, full ROM, no meningeal signs  Pulmonary: Lungs Clear to auscultation bilaterally.  No

## 2018-07-31 ENCOUNTER — APPOINTMENT (OUTPATIENT)
Dept: ULTRASOUND IMAGING | Age: 68
End: 2018-07-31
Payer: MEDICAID

## 2018-07-31 ENCOUNTER — HOSPITAL ENCOUNTER (EMERGENCY)
Age: 68
Discharge: HOME | End: 2018-07-31
Attending: EMERGENCY MEDICINE
Payer: MEDICAID

## 2018-07-31 VITALS
OXYGEN SATURATION: 95 % | BODY MASS INDEX: 31.53 KG/M2 | DIASTOLIC BLOOD PRESSURE: 52 MMHG | RESPIRATION RATE: 18 BRPM | HEIGHT: 66 IN | WEIGHT: 196.21 LBS | TEMPERATURE: 98.4 F | HEART RATE: 72 BPM | SYSTOLIC BLOOD PRESSURE: 96 MMHG

## 2018-07-31 DIAGNOSIS — M79.89 LEFT LEG SWELLING: Primary | ICD-10-CM

## 2018-07-31 PROCEDURE — 6370000000 HC RX 637 (ALT 250 FOR IP): Performed by: EMERGENCY MEDICINE

## 2018-07-31 PROCEDURE — 93971 EXTREMITY STUDY: CPT

## 2018-07-31 PROCEDURE — 99283 EMERGENCY DEPT VISIT LOW MDM: CPT

## 2018-07-31 RX ORDER — CYCLOBENZAPRINE HYDROCHLORIDE 7.5 MG/1
7.5 TABLET, FILM COATED ORAL 3 TIMES DAILY PRN
Qty: 10 TABLET | Refills: 0 | Status: SHIPPED | OUTPATIENT
Start: 2018-07-31 | End: 2018-08-03

## 2018-07-31 RX ORDER — ACETAMINOPHEN 500 MG
1000 TABLET ORAL EVERY 6 HOURS PRN
Qty: 120 TABLET | Refills: 3 | Status: SHIPPED | OUTPATIENT
Start: 2018-07-31

## 2018-07-31 RX ORDER — ACETAMINOPHEN 325 MG/1
650 TABLET ORAL ONCE
Status: COMPLETED | OUTPATIENT
Start: 2018-07-31 | End: 2018-07-31

## 2018-07-31 RX ADMIN — ACETAMINOPHEN 650 MG: 325 TABLET ORAL at 20:18

## 2018-07-31 ASSESSMENT — PAIN SCALES - GENERAL: PAINLEVEL_OUTOF10: 6

## 2018-07-31 NOTE — ED NOTES
Bed:  BARNES-06  Expected date:   Expected time:   Means of arrival:   Comments:  Bud Cesar RN  07/31/18 9714

## 2018-08-01 ASSESSMENT — ENCOUNTER SYMPTOMS
ABDOMINAL PAIN: 0
NAUSEA: 0
DIARRHEA: 0
SHORTNESS OF BREATH: 0
VOMITING: 0
COLOR CHANGE: 0
SORE THROAT: 0
COUGH: 0

## 2018-08-01 NOTE — ED PROVIDER NOTES
Jina Jorge is a 78 y.o. male with PMH significant for HTN, DM CKD and A. Fib presenting to the emergency department via walk-in for left leg pain for the past two days. Per patient, he has had worsening posteriolateral left calf pain for the past two days. He notes worsening swelling of his left leg in comparison to his right. He complains of pain worsening when he walks. The pain is improved with rest. Patient was sent here by dialysis with concern patient may have a DVT. He denies chest pain or shortness of breath. Patient also denies history of DVT or PE as well as recent trauma or fall. Patient reports he completed dialysis. The history is provided by the patient and a relative. Extremity Weakness   Severity:  Mild  Onset quality:  Gradual  Duration:  2 days  Timing:  Intermittent  Progression:  Worsening  Chronicity:  New  Relieved by:  Lying down and rest  Worsened by: Activity  Ineffective treatments:  Pain relief  Associated symptoms: difficulty walking and myalgias (left lower extremity)    Associated symptoms: no abdominal pain, no arthralgias, no chest pain, no cough, no diarrhea, no dysuria, no numbness in extremities, no fever, no headaches, no nausea, no shortness of breath and no vomiting    Risk factors: diabetes        Review of Systems   Constitutional: Negative for chills and fever. HENT: Negative for congestion and sore throat. Eyes: Negative for visual disturbance. Respiratory: Negative for cough and shortness of breath. Cardiovascular: Positive for leg swelling (Left>right). Negative for chest pain and palpitations. Gastrointestinal: Negative for abdominal pain, diarrhea, nausea and vomiting. Genitourinary: Negative for difficulty urinating, dysuria and flank pain. Musculoskeletal: Positive for gait problem (secondary to pain) and myalgias (left lower extremity). Negative for arthralgias, neck pain and neck stiffness.    Skin: Negative for color change, pallor, rash RESULTS -------------------------------------------------    Lab  No results found for this visit on 07/31/18. Radiology  US DUP LOWER EXTREMITY LEFT ASHVIN   Final Result   No sonographic evidence of deep venous thrombosis in the left lower   extremity as seen. ------------------------- NURSING NOTES AND VITALS REVIEWED ---------------------------  Date / Time Roomed:  7/31/2018  5:51 PM  ED Bed Assignment:  [id number]    The nursing notes within the ED encounter and vital signs as below have been reviewed. Patient Vitals for the past 24 hrs:   BP Temp Temp src Pulse Resp SpO2 Height Weight   07/31/18 1749 (!) 96/52 98.4 °F (36.9 °C) Oral 72 18 95 % 5' 6\" (1.676 m) 196 lb 3.4 oz (89 kg)       Oxygen Saturation Interpretation: Normal    ------------------------------------------ PROGRESS NOTES ------------------------------------------  ED Course as of Aug 01 1034   Tue Jul 31, 2018 2006 Patient reassessed Patient continues complaining of pain. Will give tylenol. Patient aware ultrasound does not show evidence of DVT. [MA]      ED Course User Index  [MA] Aries Higgins       20:20   I have spoken with the patient and spouse and daughter and discussed todays results, in addition to providing specific details for the plan of care and counseling regarding the diagnosis and prognosis. Their questions are answered at this time and they are agreeable with the plan. I discussed at length with them reasons for immediate return here for re evaluation. They will followup with their primary care physician by calling their office tomorrow. --------------------------------- ADDITIONAL PROVIDER NOTES ---------------------------------  At this time the patient is without objective evidence of an acute process requiring hospitalization or inpatient management. They have remained hemodynamically stable throughout their entire ED visit and are stable for discharge with outpatient follow-up.      The plan has been discussed in detail and they are aware of the specific conditions for emergent return, as well as the importance of follow-up. Discharge Medication List as of 7/31/2018  8:22 PM      START taking these medications    Details   cyclobenzaprine (FEXMID) 7.5 MG tablet Take 1 tablet by mouth 3 times daily as needed for Muscle spasms, Disp-10 tablet, R-0Print      acetaminophen (TYLENOL) 500 MG tablet Take 2 tablets by mouth every 6 hours as needed for Pain Maximum dose- 8 tablets/24 hours. , Disp-120 tablet, R-3Print             Diagnosis:  1. Left leg swelling        Disposition:  Patient's disposition: Discharge to home  Patient's condition is stable.                    Pat Simpson  Resident  08/01/18 4580

## 2018-08-05 ENCOUNTER — APPOINTMENT (OUTPATIENT)
Dept: GENERAL RADIOLOGY | Age: 68
DRG: 425 | End: 2018-08-05
Payer: MEDICAID

## 2018-08-05 ENCOUNTER — HOSPITAL ENCOUNTER (INPATIENT)
Age: 68
LOS: 1 days | Discharge: LEFT BEFORE DISCHARGE | DRG: 425 | End: 2018-08-06
Attending: EMERGENCY MEDICINE | Admitting: FAMILY MEDICINE
Payer: MEDICAID

## 2018-08-05 ENCOUNTER — APPOINTMENT (OUTPATIENT)
Dept: CT IMAGING | Age: 68
DRG: 425 | End: 2018-08-05
Payer: MEDICAID

## 2018-08-05 DIAGNOSIS — R11.2 NON-INTRACTABLE VOMITING WITH NAUSEA, UNSPECIFIED VOMITING TYPE: Primary | ICD-10-CM

## 2018-08-05 DIAGNOSIS — I50.9 ACUTE ON CHRONIC CONGESTIVE HEART FAILURE, UNSPECIFIED HEART FAILURE TYPE (HCC): ICD-10-CM

## 2018-08-05 DIAGNOSIS — R77.8 ELEVATED TROPONIN: ICD-10-CM

## 2018-08-05 DIAGNOSIS — J90 PLEURAL EFFUSION: ICD-10-CM

## 2018-08-05 PROBLEM — R10.13 EPIGASTRIC PAIN: Status: RESOLVED | Noted: 2018-08-05 | Resolved: 2018-08-05

## 2018-08-05 PROBLEM — R10.13 EPIGASTRIC PAIN: Status: ACTIVE | Noted: 2018-08-05

## 2018-08-05 PROBLEM — E87.1 HYPONATREMIA: Status: ACTIVE | Noted: 2018-08-05

## 2018-08-05 PROBLEM — R17 ELEVATED BILIRUBIN: Status: ACTIVE | Noted: 2018-08-05

## 2018-08-05 LAB
ALBUMIN SERPL-MCNC: 4.1 G/DL (ref 3.5–5.2)
ALP BLD-CCNC: 228 U/L (ref 40–129)
ALT SERPL-CCNC: 25 U/L (ref 0–40)
ANION GAP SERPL CALCULATED.3IONS-SCNC: 14 MMOL/L (ref 7–16)
AST SERPL-CCNC: 23 U/L (ref 0–39)
BASOPHILS ABSOLUTE: 0.03 E9/L (ref 0–0.2)
BASOPHILS RELATIVE PERCENT: 0.5 % (ref 0–2)
BILIRUB SERPL-MCNC: 1.3 MG/DL (ref 0–1.2)
BUN BLDV-MCNC: 33 MG/DL (ref 8–23)
CALCIUM SERPL-MCNC: 9.9 MG/DL (ref 8.6–10.2)
CHLORIDE BLD-SCNC: 86 MMOL/L (ref 98–107)
CO2: 27 MMOL/L (ref 22–29)
CREAT SERPL-MCNC: 5.1 MG/DL (ref 0.7–1.2)
EOSINOPHILS ABSOLUTE: 0.08 E9/L (ref 0.05–0.5)
EOSINOPHILS RELATIVE PERCENT: 1.3 % (ref 0–6)
GFR AFRICAN AMERICAN: 14
GFR NON-AFRICAN AMERICAN: 11 ML/MIN/1.73
GLUCOSE BLD-MCNC: 142 MG/DL (ref 74–109)
HCT VFR BLD CALC: 27.7 % (ref 37–54)
HEMOGLOBIN: 9.6 G/DL (ref 12.5–16.5)
IMMATURE GRANULOCYTES #: 0.03 E9/L
IMMATURE GRANULOCYTES %: 0.5 % (ref 0–5)
LACTIC ACID: 1 MMOL/L (ref 0.5–2.2)
LIPASE: 14 U/L (ref 13–60)
LYMPHOCYTES ABSOLUTE: 0.73 E9/L (ref 1.5–4)
LYMPHOCYTES RELATIVE PERCENT: 12.1 % (ref 20–42)
MCH RBC QN AUTO: 32.4 PG (ref 26–35)
MCHC RBC AUTO-ENTMCNC: 34.7 % (ref 32–34.5)
MCV RBC AUTO: 93.6 FL (ref 80–99.9)
METER GLUCOSE: 114 MG/DL (ref 70–110)
MONOCYTES ABSOLUTE: 0.57 E9/L (ref 0.1–0.95)
MONOCYTES RELATIVE PERCENT: 9.5 % (ref 2–12)
NEUTROPHILS ABSOLUTE: 4.57 E9/L (ref 1.8–7.3)
NEUTROPHILS RELATIVE PERCENT: 76.1 % (ref 43–80)
PDW BLD-RTO: 14.8 FL (ref 11.5–15)
PLATELET # BLD: 217 E9/L (ref 130–450)
PMV BLD AUTO: 11.5 FL (ref 7–12)
POTASSIUM SERPL-SCNC: 5 MMOL/L (ref 3.5–5)
PRO-BNP: ABNORMAL PG/ML (ref 0–125)
RBC # BLD: 2.96 E12/L (ref 3.8–5.8)
SODIUM BLD-SCNC: 127 MMOL/L (ref 132–146)
TOTAL PROTEIN: 7.9 G/DL (ref 6.4–8.3)
TROPONIN: 0.65 NG/ML (ref 0–0.03)
WBC # BLD: 6 E9/L (ref 4.5–11.5)

## 2018-08-05 PROCEDURE — 83880 ASSAY OF NATRIURETIC PEPTIDE: CPT

## 2018-08-05 PROCEDURE — G0378 HOSPITAL OBSERVATION PER HR: HCPCS

## 2018-08-05 PROCEDURE — 99285 EMERGENCY DEPT VISIT HI MDM: CPT

## 2018-08-05 PROCEDURE — 1200000000 HC SEMI PRIVATE

## 2018-08-05 PROCEDURE — 82962 GLUCOSE BLOOD TEST: CPT

## 2018-08-05 PROCEDURE — 83605 ASSAY OF LACTIC ACID: CPT

## 2018-08-05 PROCEDURE — 81001 URINALYSIS AUTO W/SCOPE: CPT

## 2018-08-05 PROCEDURE — 74176 CT ABD & PELVIS W/O CONTRAST: CPT

## 2018-08-05 PROCEDURE — 96375 TX/PRO/DX INJ NEW DRUG ADDON: CPT

## 2018-08-05 PROCEDURE — 85025 COMPLETE CBC W/AUTO DIFF WBC: CPT

## 2018-08-05 PROCEDURE — 6360000002 HC RX W HCPCS: Performed by: EMERGENCY MEDICINE

## 2018-08-05 PROCEDURE — 84484 ASSAY OF TROPONIN QUANT: CPT

## 2018-08-05 PROCEDURE — 80053 COMPREHEN METABOLIC PANEL: CPT

## 2018-08-05 PROCEDURE — 71045 X-RAY EXAM CHEST 1 VIEW: CPT

## 2018-08-05 PROCEDURE — 96374 THER/PROPH/DIAG INJ IV PUSH: CPT

## 2018-08-05 PROCEDURE — 83690 ASSAY OF LIPASE: CPT

## 2018-08-05 RX ORDER — FAMOTIDINE 20 MG/1
20 TABLET, FILM COATED ORAL NIGHTLY PRN
Status: DISCONTINUED | OUTPATIENT
Start: 2018-08-05 | End: 2018-08-06 | Stop reason: HOSPADM

## 2018-08-05 RX ORDER — OYSTER SHELL CALCIUM WITH VITAMIN D 500; 200 MG/1; [IU]/1
1 TABLET, FILM COATED ORAL DAILY
Status: DISCONTINUED | OUTPATIENT
Start: 2018-08-06 | End: 2018-08-06 | Stop reason: HOSPADM

## 2018-08-05 RX ORDER — SODIUM CHLORIDE 0.9 % (FLUSH) 0.9 %
10 SYRINGE (ML) INJECTION EVERY 12 HOURS SCHEDULED
Status: DISCONTINUED | OUTPATIENT
Start: 2018-08-05 | End: 2018-08-06 | Stop reason: HOSPADM

## 2018-08-05 RX ORDER — CA/D3/MAG OX/ZINC/COP/MANG/BOR 600 MG-800
1 TABLET,CHEWABLE ORAL EVERY MORNING
Status: DISCONTINUED | OUTPATIENT
Start: 2018-08-06 | End: 2018-08-05 | Stop reason: CLARIF

## 2018-08-05 RX ORDER — DEXTROSE MONOHYDRATE 25 G/50ML
12.5 INJECTION, SOLUTION INTRAVENOUS PRN
Status: DISCONTINUED | OUTPATIENT
Start: 2018-08-05 | End: 2018-08-06 | Stop reason: HOSPADM

## 2018-08-05 RX ORDER — PANTOPRAZOLE SODIUM 40 MG/10ML
40 INJECTION, POWDER, LYOPHILIZED, FOR SOLUTION INTRAVENOUS DAILY
Status: DISCONTINUED | OUTPATIENT
Start: 2018-08-05 | End: 2018-08-06 | Stop reason: HOSPADM

## 2018-08-05 RX ORDER — DEXTROSE MONOHYDRATE 50 MG/ML
100 INJECTION, SOLUTION INTRAVENOUS PRN
Status: DISCONTINUED | OUTPATIENT
Start: 2018-08-05 | End: 2018-08-06 | Stop reason: HOSPADM

## 2018-08-05 RX ORDER — NICOTINE POLACRILEX 4 MG
15 LOZENGE BUCCAL PRN
Status: DISCONTINUED | OUTPATIENT
Start: 2018-08-05 | End: 2018-08-06 | Stop reason: HOSPADM

## 2018-08-05 RX ORDER — MORPHINE SULFATE 4 MG/ML
4 INJECTION, SOLUTION INTRAMUSCULAR; INTRAVENOUS ONCE
Status: COMPLETED | OUTPATIENT
Start: 2018-08-05 | End: 2018-08-05

## 2018-08-05 RX ORDER — ONDANSETRON 2 MG/ML
8 INJECTION INTRAMUSCULAR; INTRAVENOUS ONCE
Status: COMPLETED | OUTPATIENT
Start: 2018-08-05 | End: 2018-08-05

## 2018-08-05 RX ORDER — HEPARIN SODIUM 10000 [USP'U]/ML
5000 INJECTION, SOLUTION INTRAVENOUS; SUBCUTANEOUS EVERY 12 HOURS
Status: DISCONTINUED | OUTPATIENT
Start: 2018-08-05 | End: 2018-08-06 | Stop reason: HOSPADM

## 2018-08-05 RX ORDER — SODIUM CHLORIDE 0.9 % (FLUSH) 0.9 %
10 SYRINGE (ML) INJECTION PRN
Status: DISCONTINUED | OUTPATIENT
Start: 2018-08-05 | End: 2018-08-05 | Stop reason: SDUPTHER

## 2018-08-05 RX ORDER — ISOSORBIDE DINITRATE 10 MG/1
20 TABLET ORAL 3 TIMES DAILY
Status: DISCONTINUED | OUTPATIENT
Start: 2018-08-05 | End: 2018-08-06 | Stop reason: HOSPADM

## 2018-08-05 RX ORDER — SODIUM CHLORIDE 0.9 % (FLUSH) 0.9 %
10 SYRINGE (ML) INJECTION EVERY 12 HOURS SCHEDULED
Status: DISCONTINUED | OUTPATIENT
Start: 2018-08-05 | End: 2018-08-05 | Stop reason: SDUPTHER

## 2018-08-05 RX ORDER — CYCLOBENZAPRINE HCL 5 MG
5 TABLET ORAL 3 TIMES DAILY PRN
COMMUNITY

## 2018-08-05 RX ORDER — SODIUM CHLORIDE 0.9 % (FLUSH) 0.9 %
10 SYRINGE (ML) INJECTION PRN
Status: DISCONTINUED | OUTPATIENT
Start: 2018-08-05 | End: 2018-08-06 | Stop reason: HOSPADM

## 2018-08-05 RX ORDER — LIDOCAINE 40 MG/G
CREAM TOPICAL
Status: DISCONTINUED | OUTPATIENT
Start: 2018-08-06 | End: 2018-08-06 | Stop reason: HOSPADM

## 2018-08-05 RX ORDER — AMLODIPINE BESYLATE 10 MG/1
10 TABLET ORAL EVERY MORNING
Status: DISCONTINUED | OUTPATIENT
Start: 2018-08-06 | End: 2018-08-06 | Stop reason: HOSPADM

## 2018-08-05 RX ORDER — ACETAMINOPHEN 325 MG/1
650 TABLET ORAL EVERY 4 HOURS PRN
Status: DISCONTINUED | OUTPATIENT
Start: 2018-08-05 | End: 2018-08-06 | Stop reason: HOSPADM

## 2018-08-05 RX ORDER — METOCLOPRAMIDE HYDROCHLORIDE 5 MG/ML
10 INJECTION INTRAMUSCULAR; INTRAVENOUS ONCE
Status: COMPLETED | OUTPATIENT
Start: 2018-08-05 | End: 2018-08-05

## 2018-08-05 RX ADMIN — MORPHINE SULFATE 4 MG: 4 INJECTION INTRAVENOUS at 18:45

## 2018-08-05 RX ADMIN — METOCLOPRAMIDE 10 MG: 5 INJECTION, SOLUTION INTRAMUSCULAR; INTRAVENOUS at 19:16

## 2018-08-05 RX ADMIN — ONDANSETRON HYDROCHLORIDE 8 MG: 2 INJECTION, SOLUTION INTRAMUSCULAR; INTRAVENOUS at 18:45

## 2018-08-05 ASSESSMENT — ENCOUNTER SYMPTOMS
ABDOMINAL DISTENTION: 1
CONSTIPATION: 1
NAUSEA: 1
BACK PAIN: 0
ABDOMINAL PAIN: 1
DIARRHEA: 0
SHORTNESS OF BREATH: 1

## 2018-08-05 ASSESSMENT — PAIN SCALES - GENERAL
PAINLEVEL_OUTOF10: 7
PAINLEVEL_OUTOF10: 4
PAINLEVEL_OUTOF10: 0

## 2018-08-05 ASSESSMENT — PAIN DESCRIPTION - PROGRESSION: CLINICAL_PROGRESSION: GRADUALLY IMPROVING

## 2018-08-05 NOTE — ED PROVIDER NOTES
This is a 78year old male with a PMH of ESRD and HTN who presents to the ED with a complaint of abdominal pain. Patient remarks that for the past 4 days he has been having epigastric abdominal pain which does radiate down to his lower abdomen. Patient states that he has also been having nausea and has vomited several times. Patient remarks that he has not been able to tolerate any PO intake including liquids or solids. The patient also states that he has not been able to have a bowel movement in that period of time. He states that he is scheduled to have a colonscopy with his GI physician however has been unable to tolerate the bowel prep. Patient denies any chest pain, fevers or chills. Patient has not missed by dialysis. The history is provided by the patient. No  was used. Review of Systems   Constitutional: Negative for fever. HENT: Negative for congestion. Eyes: Negative for visual disturbance. Respiratory: Positive for shortness of breath. Cardiovascular: Negative for chest pain. Gastrointestinal: Positive for abdominal distention, abdominal pain, constipation and nausea. Negative for diarrhea. Endocrine: Negative for polyuria. Genitourinary: Negative for dysuria. Musculoskeletal: Negative for back pain. Skin: Negative for wound. Allergic/Immunologic: Negative for immunocompromised state. Neurological: Positive for headaches. Psychiatric/Behavioral: Positive for confusion. Physical Exam   Constitutional: He is oriented to person, place, and time. He appears well-developed and well-nourished. No distress. HENT:   Head: Normocephalic and atraumatic. Eyes: EOM are normal. Pupils are equal, round, and reactive to light. Neck: Normal range of motion. Neck supple. Cardiovascular: Normal rate and regular rhythm. No murmur heard. Pulmonary/Chest: Effort normal and breath sounds normal. No respiratory distress. He has no wheezes.  He has no Basophils % 0.5 0.0 - 2.0 %    Neutrophils # 4.57 1.80 - 7.30 E9/L    Immature Granulocytes # 0.03 E9/L    Lymphocytes # 0.73 (L) 1.50 - 4.00 E9/L    Monocytes # 0.57 0.10 - 0.95 E9/L    Eosinophils # 0.08 0.05 - 0.50 E9/L    Basophils # 0.03 0.00 - 0.20 E9/L   Lipase   Result Value Ref Range    Lipase 14 13 - 60 U/L   Lactic Acid, Plasma   Result Value Ref Range    Lactic Acid 1.0 0.5 - 2.2 mmol/L   Troponin   Result Value Ref Range    Troponin 0.65 (H) 0.00 - 0.03 ng/mL   Brain Natriuretic Peptide   Result Value Ref Range    Pro-BNP 18,276 (H) 0 - 125 pg/mL   EKG 12 Lead   Result Value Ref Range    Ventricular Rate 75 BPM    Atrial Rate 75 BPM    P-R Interval 272 ms    QRS Duration 136 ms    Q-T Interval 454 ms    QTc Calculation (Bazett) 506 ms    P Axis 88 degrees    R Axis -78 degrees    T Axis 43 degrees       RADIOLOGY:  CT ABDOMEN PELVIS WO CONTRAST   Final Result      1. Moderately large bilateral water density pleural effusions are   present, and there is pericardial fluid which is also water density. 2. There is increased density in the small bowel mesenteric root   suggesting panniculitis, and confluent density in the mesenteric root   extends to the lower abdominal quadrants, in addition to small amounts   of paracolic gutter fluid. Findings may represent some chronic   inflammation and fibrosis. 3. Perinephric stranding is likely a chronic finding related to renal   disease in this dialysis patient. 4. There is no evidence of obstruction, perforation, or inflammation   involving bowel structures otherwise. XR CHEST PORTABLE   Final Result   There are minimal signs that can indicate an volume   overload. Please correlate clinically.               ------------------------- NURSING NOTES AND VITALS REVIEWED ---------------------------  Date / Time Roomed:  8/5/2018  5:20 PM  ED Bed Assignment:  DAMIR/DAMIR    The nursing notes within the ED encounter and vital signs as below have been reviewed. Patient Vitals for the past 24 hrs:   BP Temp Temp src Pulse Resp SpO2 Height Weight   08/05/18 1948 (!) 148/76 98.8 °F (37.1 °C) Oral 72 16 94 % - -   08/05/18 1718 (!) 150/75 - - - 16 93 % 5' 3\" (1.6 m) 200 lb (90.7 kg)   08/05/18 1717 - 98.9 °F (37.2 °C) Oral 72 - - - -       Oxygen Saturation Interpretation: Normal    ------------------------------------------ PROGRESS NOTES ------------------------------------------  Re-evaluation(s):  Time: 666 Patients symptoms show no change  Repeat physical examination is not changed    Counseling:  I have spoken with the patient and discussed todays results, in addition to providing specific details for the plan of care and counseling regarding the diagnosis and prognosis. Their questions are answered at this time and they are agreeable with the plan of admission.    --------------------------------- ADDITIONAL PROVIDER NOTES ---------------------------------  Consultations:  Medicine Team.  Discussed case. They will admit the patient. This patient's ED course included: a personal history and physicial examination, re-evaluation prior to disposition, multiple bedside re-evaluations, IV medications, cardiac monitoring, continuous pulse oximetry and complex medical decision making and emergency management    This patient has remained hemodynamically stable during their ED course. Diagnosis:  1. Non-intractable vomiting with nausea, unspecified vomiting type    2. Elevated troponin    3. Acute on chronic congestive heart failure, unspecified heart failure type ([address]    4. Pleural effusion        Disposition:  Patient's disposition: Admit to telemetry  Patient's condition is stable. EKG Interpretation. EKG: This EKG is signed and interpreted by me.     Rate: 75  Rhythm: Sinus  Interpretation: right bundle branch block, 1st degree AV block  Comparison: stable as compared to patient's most recent EKG         Aris Less, DO  Resident  08/05/18 2024

## 2018-08-06 ENCOUNTER — APPOINTMENT (OUTPATIENT)
Dept: ULTRASOUND IMAGING | Age: 68
DRG: 425 | End: 2018-08-06
Payer: MEDICAID

## 2018-08-06 ENCOUNTER — APPOINTMENT (OUTPATIENT)
Dept: NUCLEAR MEDICINE | Age: 68
DRG: 425 | End: 2018-08-06
Payer: MEDICAID

## 2018-08-06 VITALS
OXYGEN SATURATION: 94 % | DIASTOLIC BLOOD PRESSURE: 78 MMHG | HEIGHT: 63 IN | SYSTOLIC BLOOD PRESSURE: 137 MMHG | TEMPERATURE: 98.4 F | HEART RATE: 85 BPM | RESPIRATION RATE: 18 BRPM | BODY MASS INDEX: 35.43 KG/M2 | WEIGHT: 199.96 LBS

## 2018-08-06 PROBLEM — I50.40 COMBINED SYSTOLIC AND DIASTOLIC CONGESTIVE HEART FAILURE (HCC): Status: ACTIVE | Noted: 2018-08-06

## 2018-08-06 LAB
ALBUMIN SERPL-MCNC: 3.8 G/DL (ref 3.5–5.2)
ALP BLD-CCNC: 201 U/L (ref 40–129)
ALT SERPL-CCNC: 23 U/L (ref 0–40)
ANION GAP SERPL CALCULATED.3IONS-SCNC: 12 MMOL/L (ref 7–16)
AST SERPL-CCNC: 22 U/L (ref 0–39)
BACTERIA: NORMAL /HPF
BASOPHILS ABSOLUTE: 0.04 E9/L (ref 0–0.2)
BASOPHILS RELATIVE PERCENT: 0.7 % (ref 0–2)
BILIRUB SERPL-MCNC: 1.3 MG/DL (ref 0–1.2)
BILIRUBIN DIRECT: 0.6 MG/DL (ref 0–0.3)
BILIRUBIN URINE: NEGATIVE
BILIRUBIN, INDIRECT: 0.7 MG/DL (ref 0–1)
BLOOD, URINE: ABNORMAL
BUN BLDV-MCNC: 38 MG/DL (ref 8–23)
CALCIUM SERPL-MCNC: 9.5 MG/DL (ref 8.6–10.2)
CHLORIDE BLD-SCNC: 88 MMOL/L (ref 98–107)
CLARITY: CLEAR
CO2: 28 MMOL/L (ref 22–29)
COLOR: YELLOW
CREAT SERPL-MCNC: 6.1 MG/DL (ref 0.7–1.2)
EOSINOPHILS ABSOLUTE: 0.09 E9/L (ref 0.05–0.5)
EOSINOPHILS RELATIVE PERCENT: 1.6 % (ref 0–6)
GFR AFRICAN AMERICAN: 11
GFR NON-AFRICAN AMERICAN: 9 ML/MIN/1.73
GLUCOSE BLD-MCNC: 162 MG/DL (ref 74–109)
GLUCOSE URINE: 100 MG/DL
HCT VFR BLD CALC: 27.3 % (ref 37–54)
HEMOGLOBIN: 9 G/DL (ref 12.5–16.5)
IMMATURE GRANULOCYTES #: 0.02 E9/L
IMMATURE GRANULOCYTES %: 0.4 % (ref 0–5)
IMMATURE RETIC FRACT: 20.2 % (ref 2.3–13.4)
INR BLD: 1.3
KETONES, URINE: NEGATIVE MG/DL
LEUKOCYTE ESTERASE, URINE: NEGATIVE
LYMPHOCYTES ABSOLUTE: 0.7 E9/L (ref 1.5–4)
LYMPHOCYTES RELATIVE PERCENT: 12.6 % (ref 20–42)
MAGNESIUM: 2.2 MG/DL (ref 1.6–2.6)
MCH RBC QN AUTO: 31.9 PG (ref 26–35)
MCHC RBC AUTO-ENTMCNC: 33 % (ref 32–34.5)
MCV RBC AUTO: 96.8 FL (ref 80–99.9)
METER GLUCOSE: 108 MG/DL (ref 70–110)
MONOCYTES ABSOLUTE: 0.58 E9/L (ref 0.1–0.95)
MONOCYTES RELATIVE PERCENT: 10.5 % (ref 2–12)
NEUTROPHILS ABSOLUTE: 4.12 E9/L (ref 1.8–7.3)
NEUTROPHILS RELATIVE PERCENT: 74.2 % (ref 43–80)
NITRITE, URINE: NEGATIVE
PARATHYROID HORMONE INTACT: 126 PG/ML (ref 15–65)
PDW BLD-RTO: 15.1 FL (ref 11.5–15)
PH UA: 8 (ref 5–9)
PHOSPHORUS: 3.5 MG/DL (ref 2.5–4.5)
PLATELET # BLD: 221 E9/L (ref 130–450)
PMV BLD AUTO: 12.5 FL (ref 7–12)
POTASSIUM SERPL-SCNC: 5.4 MMOL/L (ref 3.5–5)
PROTEIN UA: >=300 MG/DL
PROTHROMBIN TIME: 14.7 SEC (ref 9.3–12.4)
RBC # BLD: 2.82 E12/L (ref 3.8–5.8)
RBC UA: NORMAL /HPF (ref 0–2)
RETIC HGB EQUIVALENT: 40.4 PG (ref 28.2–36.6)
RETICULOCYTE ABSOLUTE COUNT: 0.05 E12/L
RETICULOCYTE COUNT PCT: 1.9 % (ref 0.4–1.9)
SODIUM BLD-SCNC: 128 MMOL/L (ref 132–146)
SPECIFIC GRAVITY UA: 1.01 (ref 1–1.03)
TOTAL PROTEIN: 7.3 G/DL (ref 6.4–8.3)
UROBILINOGEN, URINE: 0.2 E.U./DL
VITAMIN D 25-HYDROXY: 12 NG/ML (ref 30–100)
WBC # BLD: 5.6 E9/L (ref 4.5–11.5)
WBC UA: NORMAL /HPF (ref 0–5)

## 2018-08-06 PROCEDURE — 99254 IP/OBS CNSLTJ NEW/EST MOD 60: CPT | Performed by: INTERNAL MEDICINE

## 2018-08-06 PROCEDURE — 80053 COMPREHEN METABOLIC PANEL: CPT

## 2018-08-06 PROCEDURE — 6370000000 HC RX 637 (ALT 250 FOR IP): Performed by: STUDENT IN AN ORGANIZED HEALTH CARE EDUCATION/TRAINING PROGRAM

## 2018-08-06 PROCEDURE — G0378 HOSPITAL OBSERVATION PER HR: HCPCS

## 2018-08-06 PROCEDURE — 82248 BILIRUBIN DIRECT: CPT

## 2018-08-06 PROCEDURE — 6360000002 HC RX W HCPCS: Performed by: INTERNAL MEDICINE

## 2018-08-06 PROCEDURE — 2580000003 HC RX 258: Performed by: STUDENT IN AN ORGANIZED HEALTH CARE EDUCATION/TRAINING PROGRAM

## 2018-08-06 PROCEDURE — 3430000000 HC RX DIAGNOSTIC RADIOPHARMACEUTICAL: Performed by: RADIOLOGY

## 2018-08-06 PROCEDURE — 90935 HEMODIALYSIS ONE EVALUATION: CPT

## 2018-08-06 PROCEDURE — 82306 VITAMIN D 25 HYDROXY: CPT

## 2018-08-06 PROCEDURE — 83970 ASSAY OF PARATHORMONE: CPT

## 2018-08-06 PROCEDURE — 87040 BLOOD CULTURE FOR BACTERIA: CPT

## 2018-08-06 PROCEDURE — 85045 AUTOMATED RETICULOCYTE COUNT: CPT

## 2018-08-06 PROCEDURE — 82962 GLUCOSE BLOOD TEST: CPT

## 2018-08-06 PROCEDURE — A9537 TC99M MEBROFENIN: HCPCS | Performed by: RADIOLOGY

## 2018-08-06 PROCEDURE — C9113 INJ PANTOPRAZOLE SODIUM, VIA: HCPCS | Performed by: INTERNAL MEDICINE

## 2018-08-06 PROCEDURE — 84100 ASSAY OF PHOSPHORUS: CPT

## 2018-08-06 PROCEDURE — 36415 COLL VENOUS BLD VENIPUNCTURE: CPT

## 2018-08-06 PROCEDURE — 80074 ACUTE HEPATITIS PANEL: CPT

## 2018-08-06 PROCEDURE — 76705 ECHO EXAM OF ABDOMEN: CPT

## 2018-08-06 PROCEDURE — 99220 PR INITIAL OBSERVATION CARE/DAY 70 MINUTES: CPT | Performed by: STUDENT IN AN ORGANIZED HEALTH CARE EDUCATION/TRAINING PROGRAM

## 2018-08-06 PROCEDURE — 6360000002 HC RX W HCPCS: Performed by: STUDENT IN AN ORGANIZED HEALTH CARE EDUCATION/TRAINING PROGRAM

## 2018-08-06 PROCEDURE — G0257 UNSCHED DIALYSIS ESRD PT HOS: HCPCS

## 2018-08-06 PROCEDURE — 85610 PROTHROMBIN TIME: CPT

## 2018-08-06 PROCEDURE — 5A1D70Z PERFORMANCE OF URINARY FILTRATION, INTERMITTENT, LESS THAN 6 HOURS PER DAY: ICD-10-PCS | Performed by: INTERNAL MEDICINE

## 2018-08-06 PROCEDURE — 96372 THER/PROPH/DIAG INJ SC/IM: CPT

## 2018-08-06 PROCEDURE — 83735 ASSAY OF MAGNESIUM: CPT

## 2018-08-06 PROCEDURE — 85025 COMPLETE CBC W/AUTO DIFF WBC: CPT

## 2018-08-06 RX ORDER — SENNA AND DOCUSATE SODIUM 50; 8.6 MG/1; MG/1
2 TABLET, FILM COATED ORAL DAILY
Status: DISCONTINUED | OUTPATIENT
Start: 2018-08-06 | End: 2018-08-06 | Stop reason: HOSPADM

## 2018-08-06 RX ORDER — HEPARIN SODIUM 1000 [USP'U]/ML
1000 INJECTION, SOLUTION INTRAVENOUS; SUBCUTANEOUS PRN
Status: DISCONTINUED | OUTPATIENT
Start: 2018-08-06 | End: 2018-08-06 | Stop reason: HOSPADM

## 2018-08-06 RX ADMIN — ISOSORBIDE DINITRATE 20 MG: 10 TABLET ORAL at 00:15

## 2018-08-06 RX ADMIN — Medication 10 ML: at 00:21

## 2018-08-06 RX ADMIN — HEPARIN SODIUM 5000 UNITS: 10000 INJECTION, SOLUTION INTRAVENOUS; SUBCUTANEOUS at 00:13

## 2018-08-06 RX ADMIN — MEBROFENIN 6 MILLICURIE: 45 INJECTION, POWDER, LYOPHILIZED, FOR SOLUTION INTRAVENOUS at 13:24

## 2018-08-06 RX ADMIN — Medication 10 ML: at 00:17

## 2018-08-06 RX ADMIN — PANTOPRAZOLE SODIUM 40 MG: 40 INJECTION, POWDER, FOR SOLUTION INTRAVENOUS at 00:20

## 2018-08-06 ASSESSMENT — PAIN SCALES - GENERAL
PAINLEVEL_OUTOF10: 0
PAINLEVEL_OUTOF10: 0

## 2018-08-06 NOTE — PROGRESS NOTES
Pt is currently refusing HIDA scan. An interpretor was used to explain what the patient would be experiencing and why the doctor ordered the scan. He was very confused and upset, stating to the interpretor that \"no one has given him anything to eat or drink for three days and he is being treated like a rock. He can not live like this. He wants his family, he has never been treated this way and he wants to go somewhere else. \" When I explained that he may not have been able to eat or drink for dialysis and that in order to receive an accurate result for our exam, we too, could not let him have anything to eat or drink he only got angrier. I called the floor nurse and explained the situation. She said that she would reach out to the ordering physicians. Since he wanted to go back to his room the HIDA scan, ECHO, and Ultrasound were not completed.     Electronically signed by Sloan Dominique on 8/6/2018 at 2:25 PM

## 2018-08-06 NOTE — PROGRESS NOTES
perforation. There appears to be some enteric contrast in the colon,   which extends to the rectum.       PELVIS: unremarkable.       LYMPHATICS: Despite the apparent fibrosis or scarring involving the   mesenteric fat, there is no evidence of mesenteric adenopathy. A few   scattered reactive lymph nodes are suspected in the retroperitoneum   bilaterally.       VASCULAR: Atherosclerotic abdominal aortic calcification is noted, but   there is no evidence of acute vascular pathology.       SKELETAL: Degenerative changes of lower lumbar spine and hips are   documented.           Impression       1. Moderately large bilateral water density pleural effusions are   present, and there is pericardial fluid which is also water density. 2. There is increased density in the small bowel mesenteric root   suggesting panniculitis, and confluent density in the mesenteric root   extends to the lower abdominal quadrants, in addition to small amounts   of paracolic gutter fluid. Findings may represent some chronic   inflammation and fibrosis. 3. Perinephric stranding is likely a chronic finding related to renal   disease in this dialysis patient. 4. There is no evidence of obstruction, perforation, or inflammation   involving bowel structures otherwise. CXR      FINDINGS: There is minimal thickening of the lateral pleural margin. On the previous study patient discrete pleural effusion. Cardiac area   has borderline size. Patient has a catheter for hemodialysis. There is   no conspicuous interstitial edema.           Impression   There are minimal signs that can indicate an volume   overload. Please correlate clinically. Plan:  1. Abdominal pain with nausea and vomiting: likely multifactorial. Pain has been present on and off for the last couple months at least. Similar symptoms in February. Patient admits to increased fluid/tightness recently.  Pain likely 2/2 fluid overload given kidney failure and increased fluid also found in lungs, as well as pericardial effusion and peripheral edema. Patient is on HD M, W, F. Hx of poor transitions of care between the [address] [place] [address] and [facility]. Unclear if he has had fluid pulled recently during his HD treatments. CT does also show possible panniculitis, which may be contributing to his pain. Furthermore the patient is constipated with no bm in 4 days. Need to obtain previous GI records including records from recent scope. Medications include pepcid and protonix as outpatient. Family states he was taking his medications before he started vomiting. Question hepatobiliary component to pain due to increase alk phos and total bili, but these levels are chronically elevated and CT did not show ascites or hepatobiliary dz. HIDA 11/16 normal.  No sign of peritonitis. No rebound or guarding. · RUQ ultrasound this morning  · DC'd protonix due to increased QTc. Patient on pepcid. · Milk of mag for constipation. · Renal consult to discuss fluid overload and dialysis. Next scheduled dialysis is today. · Tigan for nausea, did not require any overnight. Avoid zofran for QTc. · GI consult for possible scope to assess bowel further      2. ESRD on HD: Likely 2/2 diabetic nephropathy. M, W, F HD treatment by Babar Lujan. Patient's family says he has not missed a treatment this past week, but occasionally misses appointments due to his fluid status and recent abdominal pain, nausea, vomiting. Cr at baseline. Elevated bnp and troponin likely due to renal failure. BNP down from previous admission in February. · Awaiting renal consult  · Hyperkalemic today. Expected due to renal failure. · Resume renal diet after RUQ US  · Dialysis ordered.      3. Hyponatremia: likely 2/2 renal failure: increased to 128 today. · Fluid restricted diet. · Appreciate renal consult     4. Anemia: chronic likely 2/2 renal failure. At baseline. Unclear if he receives aranesp as outpt.  Continue to monitor.      5. DM type 2: last A1c 6.7% in May. Not on any medications as outpt. No high dose statin. · Obtain A1c  · Continue low dose sliding scale  · Monitor poct glucose.     6. Elevated troponin: chronically elevated due to renal dz. Higher now than previous admissions. Denies chest pain, dyspnea. EKG negative for ischemic changes. No hx ischemic heart dz, acs. Previous stress 2/2016 negative for ischemia. Likely still due to renal dz. On heparin.     7. Elevated alk phos and bilirubin: bilirubin elevated since May. CT shows fatty atrophy of pancreas. Negative liver enzymes. · RUQ US today    8. HTN: on nitrate and norvasc. Controlled currently. Continue home regimen. Hydralazine caused elevated LFTs in the past.      9. Pleural effusion: chronic. Present off and on for years. Has had thoracentesis as recently at May. Consider pulm consult depending on how much fluid is removed by alternative methods. Denies shortness of breath, but admits to cough.      10. Pericardial effusion: chronically present. had echo a few weeks ago that also showed stage 2 diastolic dysfunction, normal EF, pulmonary hypertension, left ventricular hypertrophy, left atrium dilation. Saw Dr. Kaylan Wilson and Dr. Masha Holland in the past. Will continue to monitor and evaluate this as fluid overload improves. Not currently hypotensive.      11. Hx of Afib: not noted on available ekgs so uncertain if he truly has this. One previous ekg did show flutter. Not on anticoag. Not currently in afib. Cardio note in May recommended josiane, noted chadsvasc of 5. Unclear if patient ever started medication. Will be put on heparin. lovenox contraindicated due to renal failure.        Electronically signed by Manolo May DO on 8/6/2018 at 8:01 AM

## 2018-08-06 NOTE — CARE COORDINATION
Social work / discharge planning    8/6/2018 10:42 AM  Patient does not speak [place], only [place]   Attends outpatient dialysis [facility] [phone] fax [phone]  Electronically signed by Charisse Goodman, [facility] on 8/6/2018 at 10:43 AM

## 2018-08-06 NOTE — FLOWSHEET NOTE
08/06/18 1237   Vital Signs   /78   Temp 98.4 °F (36.9 °C)   Pulse 85   Pain Assessment   Pain Level 0   Post-Hemodialysis Assessment   Post-Treatment Procedures Blood returned;Catheter capped, clamped and heparinized x 2 ports   Machine Disinfection Process Acid/Vinegar Clean;Heat Disinfect; Exterior Machine Disinfection   Rinseback Volume (ml) 300 ml   Total Liters Processed (l/min) 89.1 l/min   Dialyzer Clearance Moderately streaked   Duration of Treatment (minutes) 240 minutes   Hemodialysis Intake (ml) 300 ml   Hemodialysis Output (ml) 3000 ml   NET Removed (ml) 2700 ml   Tolerated Treatment Good   Patient Response to Treatment pt david well   Bilateral Breath Sounds Clear   Edema None

## 2018-08-06 NOTE — PROGRESS NOTES
POCGLU in the last 72 hours. Last 3 CK, CKMB, Troponin  Recent Labs      08/05/18   1806   TROPONINI  0.65*         Last 3 CBC:  Recent Labs      08/05/18   1806  08/06/18   0608   WBC  6.0  5.6   RBC  2.96*  2.82*   HGB  9.6*  9.0*   HCT  27.7*  27.3*   MCV  93.6  96.8   MCH  32.4  31.9   MCHC  34.7*  33.0   RDW  14.8  15.1*   PLT  217  221   MPV  11.5  12.5*       Last 3 Hepatic Function Panel:    Recent Labs      08/05/18   1806  08/06/18   0608   ALKPHOS  228*  201*   ALT  25  23   AST  23  22   PROT  7.9  7.3   BILITOT  1.3*  1.3*   BILIDIR   --   0.6*   LABALBU  4.1  3.8       Albumin:  Recent Labs      08/06/18   0608   LABALBU  3.8       Calcium:  Recent Labs      08/06/18   0608   CALCIUM  9.5       Ionized Calcium:  No results for input(s): IONCA in the last 72 hours. Magnesium:    Recent Labs      08/06/18   0608   MG  2.2         ABGs:  No results for input(s): PHART, PO2ART, GCL6XMJ, JRI4JJE, BEART, D4ZLUSBY in the last 72 hours. Lactic Acid:  Recent Labs      08/05/18   1806   LACTA  1.0       Last 3 Amylase:  No results for input(s): AMYLASE in the last 72 hours. Last 3 Lipase:  Recent Labs      08/05/18   1806   LIPASE  14       Last 3 BNP:  No results for input(s): BNP in the last 72 hours. Assessment/Plan      1.   Chronic kidney disease stage V/ESRD. Continue dialytic support. -PLan is for 3L vol removal on IHD today     2.   Volume overload/fluid retention with Abd Pain  Will UF as tolerated with dialysis-      3.   Hypertension with Chronic Kidney Disease Stage V/ESRD. Blood pressure are at goal. BP <140/90     4.   Anemia of CKD  Continue ARNOLDO and adjust dosage. Last Fe ++ studies adequate  Follow Hgb - stable     5.  Renal Osteodystrophy. Calcium and  Phosphorous both at goal and PTH levels adequate.  Will follow.         Thank you for the opportunity to participate in the care of Mr Clinton Dennis     ______________________________      Raphael Anderson

## 2018-08-06 NOTE — H&P
patient was in no apparent distress. He admits to generalized abdominal pain, increased abdominal tightness and cough at this time. He received anti-emetic therapy in the Ed and does not feel nauseous now. He is requesting something to eat. Denies fever, shortness of breath, chest pain, hematemesis, palpitations, melena, hematochezia. No rebound or guarding. Past Medical History:   Diagnosis Date    Cardiomyopathy ([place] [address].)     [facility]congestive [facility] failure) ([facility])     Chronic kidney disease     Diabetes mellitus ([place] [address].)     Dialysis patient ([place] [address]     Hyperlipidemia     Hypertension     PAF (paroxysmal atrial fibrillation) ([facility])          Past Surgical History:   Procedure Laterality Date    OTHER SURGICAL HISTORY Right 10/24/2016    right foot I&D with bone debridement and biopsy partial second digit amputation       Medications Prior to Admission:    Prescriptions Prior to Admission: cyclobenzaprine (FLEXERIL) 5 MG tablet, Take 5 mg by mouth 3 times daily as needed for Muscle spasms  acetaminophen (TYLENOL) 500 MG tablet, Take 2 tablets by mouth every 6 hours as needed for Pain Maximum dose- 8 tablets/24 hours. isosorbide dinitrate (ISORDIL) 20 MG tablet, Take 1 tablet by mouth 3 times daily  amLODIPine (NORVASC) 10 MG tablet, Take 1 tablet by mouth every morning  famotidine (PEPCID) 20 MG tablet, Take 1 tablet by mouth nightly as needed (Heartburn)  pantoprazole (PROTONIX) 40 MG tablet, Take 1 tablet by mouth daily  Ipratropium-Albuterol (COMBIVENT RESPIMAT IN), Inhale 1 puff into the lungs 2 times daily 1.68 mg inhaler, medication was filled and prescribed in [facility]. Patient has medication at bedside.   Calcium Carbonate-Vit D-Min (CALTRATE 600+D PLUS PO), Take 1 capsule by mouth every morning  IRON-FOLIC ACID PO, Take 1 tablet by mouth daily 100 mg/800 mcg  Patient has medication at bedside  NONFORMULARY, Inhale 1 vial into the lungs every 8 hours Translated via translating phones and patients spouse at patients bedside- -Flixopidine (in [place]=Flixotide) Nebules 0.5 mg/2mL Patient has medication at bedside    Allergies:    Hydralazine    Social History:    reports that he has quit smoking. His smoking use included Cigarettes. He has a 0.40 pack-year smoking history. He has never used smokeless tobacco. He reports that he does not drink alcohol or use drugs. Family History:   family history includes No Known Problems in his father and mother. REVIEW OF SYSTEMS:  As above in the HPI, otherwise negative    PHYSICAL EXAM:    Vitals:  /78   Pulse 72   Temp 98.1 °F (36.7 °C) (Oral)   Resp 17   Ht 5' 3\" (1.6 m)   Wt 200 lb (90.7 kg)   SpO2 94%   BMI 35.43 kg/m²     General:  Awake, alert, oriented X 3. Well developed, well nourished, well groomed. No apparent distress. Patient does not speak english. HEENT:  Normocephalic, atraumatic. No conjunctival injection. Heart:  RRR, no murmurs, gallops, or rubs  Lungs:  CTA bilaterally, bilat symmetrical expansion, no wheeze, rales, or rhonchi  Abdomen:  Mild distension, nontender, no masses, no organomegaly, no peritoneal signs, normoactive bowel sounds. Extremities:  No clubbing, cyanosis. 1+ edema b/l lower extremities.    Skin:  Warm and dry, no open lesions or rash      LABS:  CBC with Differential:    Lab Results   Component Value Date    WBC 6.0 08/05/2018    RBC 2.96 08/05/2018    HGB 9.6 08/05/2018    HCT 27.7 08/05/2018     08/05/2018    MCV 93.6 08/05/2018    MCH 32.4 08/05/2018    MCHC 34.7 08/05/2018    RDW 14.8 08/05/2018    LYMPHOPCT 12.1 08/05/2018    MONOPCT 9.5 08/05/2018    BASOPCT 0.5 08/05/2018    MONOSABS 0.57 08/05/2018    LYMPHSABS 0.73 08/05/2018    EOSABS 0.08 08/05/2018    BASOSABS 0.03 08/05/2018     CMP:    Lab Results   Component Value Date     08/05/2018    K 5.0 08/05/2018    K 4.8 05/09/2018    CL 86 08/05/2018    CO2 27 08/05/2018    BUN 33 08/05/2018    CREATININE 5.1 08/05/2018    GFRAA 14 08/05/2018

## 2018-08-06 NOTE — CONSULTS
injection 40 mg, 40 mg, Intravenous, Daily    Allergies:  Hydralazine (reported liver injury)    Social History:    Please note this information was not obtained at this time as the patient does not speak english and there is no family present at the bedside. Family History:   Please note this information was not obtained at this time as the patient does not speak english and there is no family present at the bedside. REVIEW OF SYSTEMS:    Please note this information was not obtained at this time as the patient does not speak english and there is no family present at the bedside. PHYSICAL EXAM:   /79   Pulse 86   Temp 98.9 °F (37.2 °C)   Resp 18   Ht 5' 3\" (1.6 m)   Wt 199 lb 15.3 oz (90.7 kg)   SpO2 94%   BMI 35.42 kg/m²   Physical Exam to follow as per Dr. Mihir Salinas:    ECG: SR with RBBB First degree AV Block   Tele strips:  A Fib with CVR   Diagnostic:    No intake or output data in the 24 hours ending 08/06/18 1235    Labs:   CBC:   Recent Labs      08/05/18   1806 08/06/18   0608   WBC  6.0  5.6   HGB  9.6*  9.0*   HCT  27.7*  27.3*   PLT  217  221     BMP:   Recent Labs      08/05/18   1806 08/06/18   0608   NA  127*  128*   K  5.0  5.4*   CO2  27  28   BUN  33*  38*   CREATININE  5.1*  6.1*   LABGLOM  11  9   CALCIUM  9.9  9.5     Mag:   Recent Labs      08/06/18   0608   MG  2.2     Phos:   Recent Labs      08/06/18   0608   PHOS  3.5   HgA1c:   Lab Results   Component Value Date    LABA1C 6.7 (H) 05/06/2018     PT/INR:   Recent Labs      08/06/18   0608   PROTIME  14.7*   INR  1.3   CARDIAC ENZYMES:  Recent Labs      08/05/18   1806   TROPONINI  0.65*     FASTING LIPID PANEL:  Lab Results   Component Value Date    CHOL 128 05/07/2018    HDL 36 05/07/2018    LDLCALC 82 05/07/2018    TRIG 52 05/07/2018     LIVER PROFILE:  Recent Labs      08/05/18   1806  08/06/18   0608   AST  23  22   ALT  25  23   LABALBU  4.1  3.8     08/05/2018 CT of Abdomen and Pelvis:   1.  Moderately large bilateral water density pleural effusions are  present, and there is pericardial fluid which is also water density. 2. There is increased density in the small bowel mesenteric root  suggesting panniculitis, and confluent density in the mesenteric root  extends to the lower abdominal quadrants, in addition to small amounts  of paracolic gutter fluid. Findings may represent some chronic  inflammation and fibrosis. 3. Perinephric stranding is likely a chronic finding related to renal  disease in this dialysis patient. 4. There is no evidence of obstruction, perforation, or inflammation  involving bowel structures otherwise. 08/05/2018 CXR:   There are minimal signs that can indicate an volume overload. Please correlate clinically    08/06/2018 RUQ Ultrasound:   1. Gallbladder wall thickening without gallbladder calculi or  pericholecystic fluid. Findings can be seen in the setting of  acalculus cholecystitis. Recommend clinical correlation and, if  clinically warranted, HIDA scan can be performed for further  evaluation  2. Adenomyomatosis of the gallbladder. IMPRESSION/PLAN to follow as per Dr. Esmeralda Dakin    Electronically signed by AKI Zapata CNP on 8/6/2018 at 12:35 PM     Full Consult      HPI:  Patient seen in evaluation for QT prolongation and pericardial effusion. Patient admitted with abdominal discomfort. CT scan with questionable pericardial effusion. Did receive Zofran for his nausea in ER. Patient with no unusual shortness of breath.  No palpitations heart racing or lightheadedness.     Past Medical History        Past Medical History:   Diagnosis Date    Cardiomyopathy [facility])      CHF (congestive heart failure) ([facility])      Chronic kidney disease      [facility]      Dialysis [facility]      Hemodialysis patient ([facility]      Hyperlipidemia      Hypertension      PAF (paroxysmal atrial fibrillation) ([facility]              Social History   Social History            Social History    Marital status: Legally        Spouse name: N/A    Number of children: N/A    Years of education: N/A          Occupational History    Not on file.             Social History Main Topics    Smoking status: Former Smoker       Packs/day: 0.20       Years: 2.00       Types: Cigarettes    Smokeless tobacco: Never Used         Comment: age [age]    Alcohol use No    Drug use: No    Sexual activity: Yes       Partners: Female           Other Topics Concern    Not on file          Social History Narrative     Drinks occ [employer]            Family History         Family History   Problem Relation Age of Onset    No Known Problems Mother      No Known Problems Father              ROS:   General: No unusual weight gain, no change in exercise tolerance  Skin: No rash or itching  EENT: No vision changes or nosebleeds  Cardiovascular: No orthopnea or paroxysmal nocturnal dyspnea  Respiratory: No cough or hemoptysis  Gastrointestinal: No hematemesis or recent changes in bowel habits  Genitourinary: No hematuria, urgency or frequency  Musculoskeletal: No muscular weakness or joint swelling   Neurologic / Psychiatric: No incoordination or convulsions  Allergic / Immunologic/ Lymphatic / Endocrine: No anemia or bleeding tendency     Physical Exam:   Vitals          Vitals:     08/06/18 1100 08/06/18 1130 08/06/18 1200 08/06/18 1237   BP: 139/79 127/80 133/79 137/78   Pulse: 80 84 86 85   Resp:           Temp:       98.4 °F (36.9 °C)   TempSrc:           SpO2:           Weight:           Height:                 HEAD & FACE: Normocephalic. Symmetric. EYES: No xanthelasma. Conjunctivae not injected. EARS, NOSE, MOUTH & THROAT: Good dentition. No oral pallor or cyanosis. NECK: No JVD at 30 degrees. No thyromegaly. RESPIRATORY: Clear to auscultation and percussion in all fields. No use of accessory muscle or intercostal retractions. CARDIOVASCULAR: Regular rate and rhythm.   No lifts or thrills on

## 2018-08-07 NOTE — DISCHARGE SUMMARY
eat food. Despite multiple attempts to explain the situation and even allow him a diet, he wanted to sign AMA. His son was in the room and also tried to reason with his father to no avail. Risks of leaving the hospital in his condition were clearly explained to patient and he understood. The tests that were planned for the patient but were never completed include: doppler venous u/s of LLE (moderate calf pain on exam and being at risk for development of DVTs from ESRD and un-treated a-fib), echocardiogram, and a HIDA scan. Of note patient, did not have an irregular rhythm during his stay but has a past history of a-fib and relatively recent outpatient encounters stating patient should be anticoagulated with Eliquis. He lives in [facility] for about 9-10 months out the year and mentioned he would get treatment over there. There were concerns of transitions of care between being in the [address] [place], [place] [address] Floor and [facility] with his medication regime. Patient was advised to follow up with a PCP, nonetheless. He is being discharged from our practice due to no meaningful patient-physician relationship and poor follow up. Discharged Condition: poor    Significant Diagnostic Studies:   US GALLBLADDER RUQ   Final Result   1. Gallbladder wall thickening without gallbladder calculi or   pericholecystic fluid. Findings can be seen in the setting of   acalculus cholecystitis. Recommend clinical correlation and, if   clinically warranted, HIDA scan can be performed for further   evaluation   2. Adenomyomatosis of the gallbladder. CT ABDOMEN PELVIS WO CONTRAST   Final Result      1. Moderately large bilateral water density pleural effusions are   present, and there is pericardial fluid which is also water density.    2. There is increased density in the small bowel mesenteric root   suggesting panniculitis, and confluent density in the mesenteric root   extends to the lower abdominal quadrants, in addition to small amounts   of paracolic gutter

## 2018-08-11 LAB
BLOOD CULTURE, ROUTINE: NORMAL
CULTURE, BLOOD 2: NORMAL
EKG ATRIAL RATE: 75 BPM
EKG P AXIS: 88 DEGREES
EKG P-R INTERVAL: 272 MS
EKG Q-T INTERVAL: 454 MS
EKG QRS DURATION: 136 MS
EKG QTC CALCULATION (BAZETT): 506 MS
EKG R AXIS: -78 DEGREES
EKG T AXIS: 43 DEGREES
EKG VENTRICULAR RATE: 75 BPM
